# Patient Record
Sex: MALE | Race: WHITE | Employment: OTHER | URBAN - METROPOLITAN AREA
[De-identification: names, ages, dates, MRNs, and addresses within clinical notes are randomized per-mention and may not be internally consistent; named-entity substitution may affect disease eponyms.]

---

## 2017-03-03 ENCOUNTER — HOSPITAL ENCOUNTER (OUTPATIENT)
Age: 77
Setting detail: OBSERVATION
Discharge: HOME | End: 2017-03-04
Attending: EMERGENCY MEDICINE | Admitting: EMERGENCY MEDICINE
Payer: MEDICARE

## 2017-03-03 ENCOUNTER — APPOINTMENT (OUTPATIENT)
Dept: GENERAL RADIOLOGY | Age: 77
End: 2017-03-03
Payer: MEDICARE

## 2017-03-03 DIAGNOSIS — R50.9 FEVER, UNSPECIFIED FEVER CAUSE: ICD-10-CM

## 2017-03-03 DIAGNOSIS — R41.0 CONFUSION: ICD-10-CM

## 2017-03-03 DIAGNOSIS — J10.1 INFLUENZA A: Primary | ICD-10-CM

## 2017-03-03 LAB
ABSOLUTE EOS #: 0 K/UL (ref 0–0.4)
ABSOLUTE LYMPH #: 0.6 K/UL (ref 1–4.8)
ABSOLUTE MONO #: 1.2 K/UL (ref 0.1–1.2)
ANION GAP SERPL CALCULATED.3IONS-SCNC: 12 MMOL/L (ref 9–17)
BASOPHILS # BLD: 0 % (ref 0–2)
BASOPHILS ABSOLUTE: 0 K/UL (ref 0–0.2)
BNP INTERPRETATION: ABNORMAL
BUN BLDV-MCNC: 17 MG/DL (ref 8–23)
BUN/CREAT BLD: ABNORMAL (ref 9–20)
CALCIUM SERPL-MCNC: 8.2 MG/DL (ref 8.6–10.4)
CHLORIDE BLD-SCNC: 98 MMOL/L (ref 98–107)
CO2: 22 MMOL/L (ref 20–31)
CREAT SERPL-MCNC: 0.95 MG/DL (ref 0.7–1.2)
DIFFERENTIAL TYPE: ABNORMAL
DIRECT EXAM: ABNORMAL
EOSINOPHILS RELATIVE PERCENT: 0 % (ref 1–4)
GFR AFRICAN AMERICAN: >60 ML/MIN
GFR NON-AFRICAN AMERICAN: >60 ML/MIN
GFR SERPL CREATININE-BSD FRML MDRD: ABNORMAL ML/MIN/{1.73_M2}
GFR SERPL CREATININE-BSD FRML MDRD: ABNORMAL ML/MIN/{1.73_M2}
GLUCOSE BLD-MCNC: 110 MG/DL (ref 70–99)
HCT VFR BLD CALC: 33.5 % (ref 41–53)
HEMOGLOBIN: 11.3 G/DL (ref 13.5–17.5)
INR BLD: >13.9
LACTIC ACID, WHOLE BLOOD: 0.9 MMOL/L (ref 0.7–2.1)
LACTIC ACID: NORMAL MMOL/L
LYMPHOCYTES # BLD: 6 % (ref 24–44)
Lab: ABNORMAL
MCH RBC QN AUTO: 30.4 PG (ref 26–34)
MCHC RBC AUTO-ENTMCNC: 33.8 G/DL (ref 31–37)
MCV RBC AUTO: 90.1 FL (ref 80–100)
MONOCYTES # BLD: 13 % (ref 2–11)
PDW BLD-RTO: 13.2 % (ref 12.5–15.4)
PLATELET # BLD: 94 K/UL (ref 140–450)
PLATELET ESTIMATE: ABNORMAL
PMV BLD AUTO: 8.4 FL (ref 6–12)
POC TROPONIN I: 0 NG/ML (ref 0–0.1)
POC TROPONIN INTERP: NORMAL
POTASSIUM SERPL-SCNC: 3.6 MMOL/L (ref 3.7–5.3)
PRO-BNP: 755 PG/ML
PROTHROMBIN TIME: >150 SEC (ref 9.4–12.6)
RBC # BLD: 3.71 M/UL (ref 4.5–5.9)
RBC # BLD: ABNORMAL 10*6/UL
SEG NEUTROPHILS: 81 % (ref 36–66)
SEGMENTED NEUTROPHILS ABSOLUTE COUNT: 7.7 K/UL (ref 1.8–7.7)
SODIUM BLD-SCNC: 132 MMOL/L (ref 135–144)
SPECIMEN DESCRIPTION: ABNORMAL
STATUS: ABNORMAL
WBC # BLD: 9.5 K/UL (ref 3.5–11)
WBC # BLD: ABNORMAL 10*3/UL

## 2017-03-03 PROCEDURE — 85610 PROTHROMBIN TIME: CPT

## 2017-03-03 PROCEDURE — 6370000000 HC RX 637 (ALT 250 FOR IP): Performed by: EMERGENCY MEDICINE

## 2017-03-03 PROCEDURE — 71020 XR CHEST STANDARD TWO VW: CPT

## 2017-03-03 PROCEDURE — 81001 URINALYSIS AUTO W/SCOPE: CPT

## 2017-03-03 PROCEDURE — 99285 EMERGENCY DEPT VISIT HI MDM: CPT

## 2017-03-03 PROCEDURE — 93005 ELECTROCARDIOGRAM TRACING: CPT

## 2017-03-03 PROCEDURE — 80076 HEPATIC FUNCTION PANEL: CPT

## 2017-03-03 PROCEDURE — 83605 ASSAY OF LACTIC ACID: CPT

## 2017-03-03 PROCEDURE — 87804 INFLUENZA ASSAY W/OPTIC: CPT

## 2017-03-03 PROCEDURE — 80048 BASIC METABOLIC PNL TOTAL CA: CPT

## 2017-03-03 PROCEDURE — 6360000002 HC RX W HCPCS: Performed by: EMERGENCY MEDICINE

## 2017-03-03 PROCEDURE — 36415 COLL VENOUS BLD VENIPUNCTURE: CPT

## 2017-03-03 PROCEDURE — 84484 ASSAY OF TROPONIN QUANT: CPT

## 2017-03-03 PROCEDURE — 2580000003 HC RX 258: Performed by: EMERGENCY MEDICINE

## 2017-03-03 PROCEDURE — 83880 ASSAY OF NATRIURETIC PEPTIDE: CPT

## 2017-03-03 PROCEDURE — 85025 COMPLETE CBC W/AUTO DIFF WBC: CPT

## 2017-03-03 RX ORDER — ONDANSETRON 2 MG/ML
4 INJECTION INTRAMUSCULAR; INTRAVENOUS ONCE
Status: COMPLETED | OUTPATIENT
Start: 2017-03-03 | End: 2017-03-03

## 2017-03-03 RX ORDER — 0.9 % SODIUM CHLORIDE 0.9 %
1000 INTRAVENOUS SOLUTION INTRAVENOUS ONCE
Status: COMPLETED | OUTPATIENT
Start: 2017-03-03 | End: 2017-03-04

## 2017-03-03 RX ORDER — ACETAMINOPHEN 325 MG/1
650 TABLET ORAL ONCE
Status: COMPLETED | OUTPATIENT
Start: 2017-03-03 | End: 2017-03-03

## 2017-03-03 RX ADMIN — ONDANSETRON 4 MG: 2 INJECTION, SOLUTION INTRAMUSCULAR; INTRAVENOUS at 23:03

## 2017-03-03 RX ADMIN — ACETAMINOPHEN 650 MG: 325 TABLET ORAL at 23:03

## 2017-03-03 RX ADMIN — SODIUM CHLORIDE 1000 ML: 9 INJECTION, SOLUTION INTRAVENOUS at 23:06

## 2017-03-03 ASSESSMENT — PAIN SCALES - GENERAL: PAINLEVEL_OUTOF10: 0

## 2017-03-03 ASSESSMENT — ENCOUNTER SYMPTOMS
PHOTOPHOBIA: 0
NAUSEA: 0
SHORTNESS OF BREATH: 1
ABDOMINAL PAIN: 0
COUGH: 1
DIARRHEA: 0
CHEST TIGHTNESS: 1
CONSTIPATION: 0
VOMITING: 0

## 2017-03-04 VITALS
RESPIRATION RATE: 18 BRPM | HEART RATE: 52 BPM | BODY MASS INDEX: 24.34 KG/M2 | TEMPERATURE: 98.1 F | SYSTOLIC BLOOD PRESSURE: 120 MMHG | HEIGHT: 70 IN | WEIGHT: 170 LBS | OXYGEN SATURATION: 98 % | DIASTOLIC BLOOD PRESSURE: 64 MMHG

## 2017-03-04 PROBLEM — Z95.1 S/P CABG (CORONARY ARTERY BYPASS GRAFT): Chronic | Status: ACTIVE | Noted: 2017-03-04

## 2017-03-04 PROBLEM — I44.7 LBBB (LEFT BUNDLE BRANCH BLOCK): Chronic | Status: ACTIVE | Noted: 2017-03-04

## 2017-03-04 LAB
-: ABNORMAL
ALBUMIN SERPL-MCNC: 3.9 G/DL (ref 3.5–5.2)
ALBUMIN/GLOBULIN RATIO: 1.6 (ref 1–2.5)
ALP BLD-CCNC: 62 U/L (ref 40–129)
ALT SERPL-CCNC: 15 U/L (ref 5–41)
AMORPHOUS: ABNORMAL
AST SERPL-CCNC: 21 U/L
BACTERIA: ABNORMAL
BILIRUB SERPL-MCNC: 0.89 MG/DL (ref 0.3–1.2)
BILIRUBIN DIRECT: 0.27 MG/DL
BILIRUBIN URINE: NEGATIVE
BILIRUBIN, INDIRECT: 0.62 MG/DL (ref 0–1)
CASTS UA: ABNORMAL /LPF (ref 0–8)
COLOR: YELLOW
CRYSTALS, UA: ABNORMAL /HPF
EKG ATRIAL RATE: 56 BPM
EKG ATRIAL RATE: 56 BPM
EKG ATRIAL RATE: 70 BPM
EKG P AXIS: 60 DEGREES
EKG P AXIS: 65 DEGREES
EKG P AXIS: 83 DEGREES
EKG P-R INTERVAL: 152 MS
EKG P-R INTERVAL: 154 MS
EKG P-R INTERVAL: 166 MS
EKG Q-T INTERVAL: 464 MS
EKG Q-T INTERVAL: 492 MS
EKG Q-T INTERVAL: 512 MS
EKG QRS DURATION: 122 MS
EKG QRS DURATION: 132 MS
EKG QRS DURATION: 132 MS
EKG QTC CALCULATION (BAZETT): 474 MS
EKG QTC CALCULATION (BAZETT): 494 MS
EKG QTC CALCULATION (BAZETT): 501 MS
EKG R AXIS: 66 DEGREES
EKG R AXIS: 70 DEGREES
EKG R AXIS: 87 DEGREES
EKG T AXIS: 4 DEGREES
EKG T AXIS: 5 DEGREES
EKG T AXIS: 55 DEGREES
EKG VENTRICULAR RATE: 56 BPM
EKG VENTRICULAR RATE: 56 BPM
EKG VENTRICULAR RATE: 70 BPM
EPITHELIAL CELLS UA: ABNORMAL /HPF (ref 0–5)
GLOBULIN: ABNORMAL G/DL (ref 1.5–3.8)
GLUCOSE URINE: NEGATIVE
INR BLD: 1.2
KETONES, URINE: ABNORMAL
LEUKOCYTE ESTERASE, URINE: NEGATIVE
MUCUS: ABNORMAL
NITRITE, URINE: NEGATIVE
OTHER OBSERVATIONS UA: ABNORMAL
PH UA: 5.5 (ref 5–8)
PROTEIN UA: NEGATIVE
PROTHROMBIN TIME: 12.8 SEC (ref 9.4–12.6)
RBC UA: ABNORMAL /HPF (ref 0–4)
RENAL EPITHELIAL, UA: ABNORMAL /HPF
SPECIFIC GRAVITY UA: 1.02 (ref 1–1.03)
TOTAL PROTEIN: 6.3 G/DL (ref 6.4–8.3)
TRICHOMONAS: ABNORMAL
TROPONIN INTERP: NORMAL
TROPONIN INTERP: NORMAL
TROPONIN T: <0.03 NG/ML
TROPONIN T: <0.03 NG/ML
TURBIDITY: CLEAR
URINE HGB: NEGATIVE
UROBILINOGEN, URINE: NORMAL
WBC UA: ABNORMAL /HPF (ref 0–5)
YEAST: ABNORMAL

## 2017-03-04 PROCEDURE — G0378 HOSPITAL OBSERVATION PER HR: HCPCS

## 2017-03-04 PROCEDURE — 2580000003 HC RX 258: Performed by: EMERGENCY MEDICINE

## 2017-03-04 PROCEDURE — 36415 COLL VENOUS BLD VENIPUNCTURE: CPT

## 2017-03-04 PROCEDURE — 96374 THER/PROPH/DIAG INJ IV PUSH: CPT

## 2017-03-04 PROCEDURE — 6370000000 HC RX 637 (ALT 250 FOR IP): Performed by: EMERGENCY MEDICINE

## 2017-03-04 PROCEDURE — 93005 ELECTROCARDIOGRAM TRACING: CPT

## 2017-03-04 PROCEDURE — 84484 ASSAY OF TROPONIN QUANT: CPT

## 2017-03-04 RX ORDER — PANTOPRAZOLE SODIUM 40 MG/1
40 TABLET, DELAYED RELEASE ORAL NIGHTLY
Status: DISCONTINUED | OUTPATIENT
Start: 2017-03-04 | End: 2017-03-04 | Stop reason: HOSPADM

## 2017-03-04 RX ORDER — PANTOPRAZOLE SODIUM 40 MG/1
40 TABLET, DELAYED RELEASE ORAL DAILY
Status: DISCONTINUED | OUTPATIENT
Start: 2017-03-04 | End: 2017-03-04

## 2017-03-04 RX ORDER — OSELTAMIVIR PHOSPHATE 75 MG/1
75 CAPSULE ORAL 2 TIMES DAILY
Status: DISCONTINUED | OUTPATIENT
Start: 2017-03-04 | End: 2017-03-04 | Stop reason: HOSPADM

## 2017-03-04 RX ORDER — METOPROLOL SUCCINATE 50 MG/1
50 TABLET, EXTENDED RELEASE ORAL DAILY
Status: DISCONTINUED | OUTPATIENT
Start: 2017-03-04 | End: 2017-03-04

## 2017-03-04 RX ORDER — OSELTAMIVIR PHOSPHATE 75 MG/1
75 CAPSULE ORAL ONCE
Status: COMPLETED | OUTPATIENT
Start: 2017-03-04 | End: 2017-03-04

## 2017-03-04 RX ORDER — ACETAMINOPHEN 325 MG/1
650 TABLET ORAL EVERY 4 HOURS PRN
Status: DISCONTINUED | OUTPATIENT
Start: 2017-03-04 | End: 2017-03-04 | Stop reason: HOSPADM

## 2017-03-04 RX ORDER — MAG HYDROX/ALUMINUM HYD/SIMETH 400-400-40
1 SUSPENSION, ORAL (FINAL DOSE FORM) ORAL DAILY
Status: DISCONTINUED | OUTPATIENT
Start: 2017-03-04 | End: 2017-03-04

## 2017-03-04 RX ORDER — GABAPENTIN 100 MG/1
100 CAPSULE ORAL NIGHTLY
Status: DISCONTINUED | OUTPATIENT
Start: 2017-03-04 | End: 2017-03-04

## 2017-03-04 RX ORDER — GABAPENTIN 300 MG/1
300 CAPSULE ORAL 3 TIMES DAILY
Status: DISCONTINUED | OUTPATIENT
Start: 2017-03-04 | End: 2017-03-04 | Stop reason: HOSPADM

## 2017-03-04 RX ORDER — CLOPIDOGREL BISULFATE 75 MG/1
75 TABLET ORAL DAILY
Status: DISCONTINUED | OUTPATIENT
Start: 2017-03-04 | End: 2017-03-04

## 2017-03-04 RX ORDER — CLOPIDOGREL BISULFATE 75 MG/1
75 TABLET ORAL DAILY
Status: DISCONTINUED | OUTPATIENT
Start: 2017-03-04 | End: 2017-03-04 | Stop reason: HOSPADM

## 2017-03-04 RX ORDER — METOPROLOL SUCCINATE 25 MG/1
25 TABLET, EXTENDED RELEASE ORAL NIGHTLY
Status: DISCONTINUED | OUTPATIENT
Start: 2017-03-04 | End: 2017-03-04 | Stop reason: HOSPADM

## 2017-03-04 RX ORDER — ASPIRIN 81 MG/1
81 TABLET ORAL DAILY
Status: DISCONTINUED | OUTPATIENT
Start: 2017-03-04 | End: 2017-03-04 | Stop reason: HOSPADM

## 2017-03-04 RX ORDER — SODIUM CHLORIDE 0.9 % (FLUSH) 0.9 %
10 SYRINGE (ML) INJECTION PRN
Status: DISCONTINUED | OUTPATIENT
Start: 2017-03-04 | End: 2017-03-04 | Stop reason: HOSPADM

## 2017-03-04 RX ORDER — METOPROLOL SUCCINATE 50 MG/1
50 TABLET, EXTENDED RELEASE ORAL NIGHTLY
Status: DISCONTINUED | OUTPATIENT
Start: 2017-03-04 | End: 2017-03-04

## 2017-03-04 RX ORDER — SODIUM CHLORIDE 0.9 % (FLUSH) 0.9 %
10 SYRINGE (ML) INJECTION EVERY 12 HOURS SCHEDULED
Status: DISCONTINUED | OUTPATIENT
Start: 2017-03-04 | End: 2017-03-04 | Stop reason: HOSPADM

## 2017-03-04 RX ORDER — SIMVASTATIN 20 MG
20 TABLET ORAL NIGHTLY
Status: DISCONTINUED | OUTPATIENT
Start: 2017-03-04 | End: 2017-03-04 | Stop reason: HOSPADM

## 2017-03-04 RX ORDER — SODIUM CHLORIDE 9 MG/ML
INJECTION, SOLUTION INTRAVENOUS CONTINUOUS
Status: DISCONTINUED | OUTPATIENT
Start: 2017-03-04 | End: 2017-03-04 | Stop reason: HOSPADM

## 2017-03-04 RX ORDER — METOPROLOL SUCCINATE 50 MG/1
25 TABLET, EXTENDED RELEASE ORAL DAILY
Qty: 30 TABLET | Refills: 3 | Status: ON HOLD | OUTPATIENT
Start: 2017-03-04 | End: 2018-03-25

## 2017-03-04 RX ADMIN — GABAPENTIN 300 MG: 300 CAPSULE ORAL at 15:44

## 2017-03-04 RX ADMIN — OSELTAMIVIR PHOSPHATE 75 MG: 75 CAPSULE ORAL at 00:54

## 2017-03-04 RX ADMIN — PANTOPRAZOLE SODIUM 40 MG: 40 TABLET, DELAYED RELEASE ORAL at 02:31

## 2017-03-04 RX ADMIN — SIMVASTATIN 20 MG: 20 TABLET, FILM COATED ORAL at 02:31

## 2017-03-04 RX ADMIN — GABAPENTIN 300 MG: 300 CAPSULE ORAL at 08:02

## 2017-03-04 RX ADMIN — GABAPENTIN 300 MG: 300 CAPSULE ORAL at 02:31

## 2017-03-04 RX ADMIN — OSELTAMIVIR PHOSPHATE 75 MG: 75 CAPSULE ORAL at 08:02

## 2017-03-04 RX ADMIN — SODIUM CHLORIDE: 9 INJECTION, SOLUTION INTRAVENOUS at 01:42

## 2017-03-04 RX ADMIN — ASPIRIN 81 MG: 81 TABLET, COATED ORAL at 08:02

## 2017-03-04 RX ADMIN — CLOPIDOGREL 75 MG: 75 TABLET, FILM COATED ORAL at 02:31

## 2017-03-04 ASSESSMENT — PAIN SCALES - GENERAL: PAINLEVEL_OUTOF10: 0

## 2017-04-06 ENCOUNTER — HOSPITAL ENCOUNTER (OUTPATIENT)
Age: 77
Setting detail: SPECIMEN
Discharge: HOME | End: 2017-04-06
Payer: MEDICARE

## 2017-04-07 LAB — DERMATOLOGY PATHOLOGY REPORT: NORMAL

## 2018-03-23 ENCOUNTER — HOSPITAL ENCOUNTER (OUTPATIENT)
Age: 78
Discharge: HOME | End: 2018-03-25
Attending: INTERNAL MEDICINE | Admitting: INTERNAL MEDICINE
Payer: MEDICARE

## 2018-03-23 ENCOUNTER — HOSPITAL ENCOUNTER (INPATIENT)
Dept: CARDIAC CATH/INVASIVE PROCEDURES | Age: 78
Discharge: HOME | End: 2018-03-23
Attending: INTERNAL MEDICINE
Payer: MEDICARE

## 2018-03-23 VITALS
OXYGEN SATURATION: 100 % | TEMPERATURE: 97.9 F | WEIGHT: 155 LBS | SYSTOLIC BLOOD PRESSURE: 129 MMHG | DIASTOLIC BLOOD PRESSURE: 68 MMHG | HEART RATE: 64 BPM | RESPIRATION RATE: 16 BRPM | HEIGHT: 70 IN | BODY MASS INDEX: 22.19 KG/M2

## 2018-03-23 DIAGNOSIS — I20.0 UNSTABLE ANGINA (HCC): ICD-10-CM

## 2018-03-23 LAB
GFR NON-AFRICAN AMERICAN: >60 ML/MIN
GFR SERPL CREATININE-BSD FRML MDRD: >60 ML/MIN
GFR SERPL CREATININE-BSD FRML MDRD: NORMAL ML/MIN/{1.73_M2}
GLUCOSE BLD-MCNC: 97 MG/DL (ref 74–100)
POC CREATININE: 0.74 MG/DL (ref 0.51–1.19)
POC HEMATOCRIT: 24 % (ref 41–53)
POC HEMOGLOBIN: 8.2 G/DL (ref 13.5–17.5)
POC IONIZED CALCIUM: 1.11 MMOL/L (ref 1.15–1.33)
POC POTASSIUM: 4.6 MMOL/L (ref 3.5–4.5)
POC SODIUM: 139 MMOL/L (ref 138–146)
TROPONIN INTERP: ABNORMAL
TROPONIN T: 1.11 NG/ML

## 2018-03-23 PROCEDURE — 2709999900 HC NON-CHARGEABLE SUPPLY

## 2018-03-23 PROCEDURE — C1874 STENT, COATED/COV W/DEL SYS: HCPCS

## 2018-03-23 PROCEDURE — 84295 ASSAY OF SERUM SODIUM: CPT

## 2018-03-23 PROCEDURE — 84132 ASSAY OF SERUM POTASSIUM: CPT

## 2018-03-23 PROCEDURE — 6360000002 HC RX W HCPCS

## 2018-03-23 PROCEDURE — 6370000000 HC RX 637 (ALT 250 FOR IP)

## 2018-03-23 PROCEDURE — C1887 CATHETER, GUIDING: HCPCS

## 2018-03-23 PROCEDURE — 82330 ASSAY OF CALCIUM: CPT

## 2018-03-23 PROCEDURE — C9600 PERC DRUG-EL COR STENT SING: HCPCS | Performed by: INTERNAL MEDICINE

## 2018-03-23 PROCEDURE — C1760 CLOSURE DEV, VASC: HCPCS

## 2018-03-23 PROCEDURE — C1725 CATH, TRANSLUMIN NON-LASER: HCPCS

## 2018-03-23 PROCEDURE — 84484 ASSAY OF TROPONIN QUANT: CPT

## 2018-03-23 PROCEDURE — C1769 GUIDE WIRE: HCPCS

## 2018-03-23 PROCEDURE — C1894 INTRO/SHEATH, NON-LASER: HCPCS

## 2018-03-23 PROCEDURE — 82947 ASSAY GLUCOSE BLOOD QUANT: CPT

## 2018-03-23 PROCEDURE — 93459 L HRT ART/GRFT ANGIO: CPT | Performed by: INTERNAL MEDICINE

## 2018-03-23 PROCEDURE — 6360000004 HC RX CONTRAST MEDICATION

## 2018-03-23 PROCEDURE — 2500000003 HC RX 250 WO HCPCS

## 2018-03-23 PROCEDURE — 6370000000 HC RX 637 (ALT 250 FOR IP): Performed by: INTERNAL MEDICINE

## 2018-03-23 PROCEDURE — 36415 COLL VENOUS BLD VENIPUNCTURE: CPT

## 2018-03-23 PROCEDURE — 92921 HC PRQ CARDIAC ANGIO ADDL ART: CPT | Performed by: INTERNAL MEDICINE

## 2018-03-23 PROCEDURE — 85014 HEMATOCRIT: CPT

## 2018-03-23 PROCEDURE — 2060000000 HC ICU INTERMEDIATE R&B

## 2018-03-23 PROCEDURE — 82565 ASSAY OF CREATININE: CPT

## 2018-03-23 RX ORDER — ONDANSETRON 2 MG/ML
4 INJECTION INTRAMUSCULAR; INTRAVENOUS EVERY 6 HOURS PRN
Status: DISCONTINUED | OUTPATIENT
Start: 2018-03-23 | End: 2018-03-25 | Stop reason: HOSPADM

## 2018-03-23 RX ORDER — PANTOPRAZOLE SODIUM 40 MG/1
40 TABLET, DELAYED RELEASE ORAL DAILY
Status: DISCONTINUED | OUTPATIENT
Start: 2018-03-24 | End: 2018-03-23 | Stop reason: SDUPTHER

## 2018-03-23 RX ORDER — DUTASTERIDE 0.5 MG/1
0.5 CAPSULE, LIQUID FILLED ORAL DAILY
COMMUNITY
End: 2021-10-11

## 2018-03-23 RX ORDER — CLOPIDOGREL BISULFATE 75 MG/1
75 TABLET ORAL DAILY
Status: DISCONTINUED | OUTPATIENT
Start: 2018-03-24 | End: 2018-03-24 | Stop reason: HOSPADM

## 2018-03-23 RX ORDER — ACETAMINOPHEN 325 MG/1
650 TABLET ORAL EVERY 4 HOURS PRN
Status: DISCONTINUED | OUTPATIENT
Start: 2018-03-23 | End: 2018-03-23 | Stop reason: SDUPTHER

## 2018-03-23 RX ORDER — SODIUM CHLORIDE 0.9 % (FLUSH) 0.9 %
10 SYRINGE (ML) INJECTION EVERY 12 HOURS SCHEDULED
Status: DISCONTINUED | OUTPATIENT
Start: 2018-03-23 | End: 2018-03-24 | Stop reason: HOSPADM

## 2018-03-23 RX ORDER — ONDANSETRON 2 MG/ML
4 INJECTION INTRAMUSCULAR; INTRAVENOUS EVERY 6 HOURS PRN
Status: DISCONTINUED | OUTPATIENT
Start: 2018-03-23 | End: 2018-03-23 | Stop reason: SDUPTHER

## 2018-03-23 RX ORDER — SODIUM CHLORIDE 0.9 % (FLUSH) 0.9 %
10 SYRINGE (ML) INJECTION EVERY 12 HOURS SCHEDULED
Status: DISCONTINUED | OUTPATIENT
Start: 2018-03-23 | End: 2018-03-25 | Stop reason: HOSPADM

## 2018-03-23 RX ORDER — FINASTERIDE 5 MG/1
5 TABLET, FILM COATED ORAL DAILY
Status: DISCONTINUED | OUTPATIENT
Start: 2018-03-24 | End: 2018-03-23 | Stop reason: SDUPTHER

## 2018-03-23 RX ORDER — DIPHENHYDRAMINE HCL 25 MG
25 TABLET ORAL NIGHTLY
Status: DISCONTINUED | OUTPATIENT
Start: 2018-03-23 | End: 2018-03-23 | Stop reason: SDUPTHER

## 2018-03-23 RX ORDER — METOPROLOL SUCCINATE 25 MG/1
25 TABLET, EXTENDED RELEASE ORAL DAILY
Status: DISCONTINUED | OUTPATIENT
Start: 2018-03-24 | End: 2018-03-23 | Stop reason: SDUPTHER

## 2018-03-23 RX ORDER — GABAPENTIN 100 MG/1
100 CAPSULE ORAL NIGHTLY
Status: DISCONTINUED | OUTPATIENT
Start: 2018-03-23 | End: 2018-03-23 | Stop reason: SDUPTHER

## 2018-03-23 RX ORDER — NITROGLYCERIN 0.4 MG/1
0.4 TABLET SUBLINGUAL EVERY 5 MIN PRN
Status: DISCONTINUED | OUTPATIENT
Start: 2018-03-23 | End: 2018-03-25 | Stop reason: HOSPADM

## 2018-03-23 RX ORDER — ACETAMINOPHEN 325 MG/1
650 TABLET ORAL EVERY 4 HOURS PRN
Status: DISCONTINUED | OUTPATIENT
Start: 2018-03-23 | End: 2018-03-25 | Stop reason: HOSPADM

## 2018-03-23 RX ORDER — SODIUM CHLORIDE 9 MG/ML
INJECTION, SOLUTION INTRAVENOUS CONTINUOUS
Status: DISCONTINUED | OUTPATIENT
Start: 2018-03-23 | End: 2018-03-24 | Stop reason: HOSPADM

## 2018-03-23 RX ORDER — SODIUM CHLORIDE 0.9 % (FLUSH) 0.9 %
10 SYRINGE (ML) INJECTION PRN
Status: DISCONTINUED | OUTPATIENT
Start: 2018-03-23 | End: 2018-03-25 | Stop reason: HOSPADM

## 2018-03-23 RX ORDER — CLOPIDOGREL BISULFATE 75 MG/1
75 TABLET ORAL DAILY
Status: DISCONTINUED | OUTPATIENT
Start: 2018-03-24 | End: 2018-03-23 | Stop reason: SDUPTHER

## 2018-03-23 RX ORDER — SIMVASTATIN 20 MG
20 TABLET ORAL NIGHTLY
Status: DISCONTINUED | OUTPATIENT
Start: 2018-03-23 | End: 2018-03-23 | Stop reason: SDUPTHER

## 2018-03-23 RX ORDER — NITROGLYCERIN 0.4 MG/1
0.4 TABLET SUBLINGUAL EVERY 5 MIN PRN
Status: DISCONTINUED | OUTPATIENT
Start: 2018-03-23 | End: 2018-03-23 | Stop reason: SDUPTHER

## 2018-03-23 RX ORDER — NITROGLYCERIN 0.4 MG/1
0.4 TABLET SUBLINGUAL EVERY 5 MIN PRN
COMMUNITY

## 2018-03-23 RX ORDER — DIPHENHYDRAMINE HCL 25 MG
25 TABLET ORAL NIGHTLY
Status: DISCONTINUED | OUTPATIENT
Start: 2018-03-23 | End: 2018-03-24 | Stop reason: HOSPADM

## 2018-03-23 RX ORDER — SODIUM CHLORIDE 0.9 % (FLUSH) 0.9 %
10 SYRINGE (ML) INJECTION PRN
Status: DISCONTINUED | OUTPATIENT
Start: 2018-03-23 | End: 2018-03-24 | Stop reason: HOSPADM

## 2018-03-23 RX ORDER — DIPHENHYDRAMINE HCL 25 MG
TABLET ORAL
Status: COMPLETED
Start: 2018-03-23 | End: 2018-03-24

## 2018-03-23 RX ORDER — ASPIRIN 81 MG/1
81 TABLET, CHEWABLE ORAL DAILY
Status: DISCONTINUED | OUTPATIENT
Start: 2018-03-24 | End: 2018-03-23 | Stop reason: SDUPTHER

## 2018-03-23 RX ORDER — METOPROLOL SUCCINATE 25 MG/1
25 TABLET, EXTENDED RELEASE ORAL DAILY
Status: DISCONTINUED | OUTPATIENT
Start: 2018-03-24 | End: 2018-03-25 | Stop reason: HOSPADM

## 2018-03-23 RX ORDER — PANTOPRAZOLE SODIUM 40 MG/1
40 TABLET, DELAYED RELEASE ORAL DAILY
Status: DISCONTINUED | OUTPATIENT
Start: 2018-03-24 | End: 2018-03-24 | Stop reason: HOSPADM

## 2018-03-23 RX ORDER — FINASTERIDE 5 MG/1
5 TABLET, FILM COATED ORAL DAILY
Status: DISCONTINUED | OUTPATIENT
Start: 2018-03-24 | End: 2018-03-25 | Stop reason: HOSPADM

## 2018-03-23 RX ORDER — SIMVASTATIN 20 MG
20 TABLET ORAL NIGHTLY
Status: DISCONTINUED | OUTPATIENT
Start: 2018-03-23 | End: 2018-03-25 | Stop reason: HOSPADM

## 2018-03-23 RX ORDER — GABAPENTIN 100 MG/1
100 CAPSULE ORAL NIGHTLY
Status: DISCONTINUED | OUTPATIENT
Start: 2018-03-23 | End: 2018-03-24 | Stop reason: HOSPADM

## 2018-03-23 RX ADMIN — SIMVASTATIN 20 MG: 20 TABLET, FILM COATED ORAL at 22:29

## 2018-03-23 RX ADMIN — SODIUM CHLORIDE: 9 INJECTION, SOLUTION INTRAVENOUS at 19:31

## 2018-03-23 RX ADMIN — ACETAMINOPHEN 650 MG: 325 TABLET ORAL at 22:29

## 2018-03-23 ASSESSMENT — PAIN DESCRIPTION - PROGRESSION: CLINICAL_PROGRESSION: GRADUALLY WORSENING

## 2018-03-23 ASSESSMENT — PAIN DESCRIPTION - ONSET: ONSET: GRADUAL

## 2018-03-23 ASSESSMENT — PAIN DESCRIPTION - LOCATION: LOCATION: BACK

## 2018-03-23 ASSESSMENT — PAIN DESCRIPTION - PAIN TYPE: TYPE: CHRONIC PAIN

## 2018-03-23 ASSESSMENT — PAIN SCALES - GENERAL: PAINLEVEL_OUTOF10: 5

## 2018-03-23 ASSESSMENT — PAIN DESCRIPTION - ORIENTATION: ORIENTATION: LOWER

## 2018-03-23 ASSESSMENT — PAIN DESCRIPTION - DESCRIPTORS: DESCRIPTORS: ACHING;DISCOMFORT

## 2018-03-23 ASSESSMENT — PAIN DESCRIPTION - FREQUENCY: FREQUENCY: INTERMITTENT

## 2018-03-24 PROBLEM — Z98.890 S/P CARDIAC CATH: Status: ACTIVE | Noted: 2018-03-24

## 2018-03-24 LAB
ALBUMIN SERPL-MCNC: 4 G/DL (ref 3.5–5.2)
ALBUMIN/GLOBULIN RATIO: 1.7 (ref 1–2.5)
ALP BLD-CCNC: 73 U/L (ref 40–129)
ALT SERPL-CCNC: 30 U/L (ref 5–41)
ANION GAP SERPL CALCULATED.3IONS-SCNC: 15 MMOL/L (ref 9–17)
AST SERPL-CCNC: 156 U/L
BILIRUB SERPL-MCNC: 0.53 MG/DL (ref 0.3–1.2)
BUN BLDV-MCNC: 18 MG/DL (ref 8–23)
BUN/CREAT BLD: ABNORMAL (ref 9–20)
CALCIUM SERPL-MCNC: 9.1 MG/DL (ref 8.6–10.4)
CHLORIDE BLD-SCNC: 108 MMOL/L (ref 98–107)
CHOLESTEROL/HDL RATIO: 2.5
CHOLESTEROL: 124 MG/DL
CO2: 19 MMOL/L (ref 20–31)
CREAT SERPL-MCNC: 0.72 MG/DL (ref 0.7–1.2)
GFR AFRICAN AMERICAN: >60 ML/MIN
GFR NON-AFRICAN AMERICAN: >60 ML/MIN
GFR SERPL CREATININE-BSD FRML MDRD: ABNORMAL ML/MIN/{1.73_M2}
GFR SERPL CREATININE-BSD FRML MDRD: ABNORMAL ML/MIN/{1.73_M2}
GLUCOSE BLD-MCNC: 170 MG/DL (ref 70–99)
HCT VFR BLD CALC: 34.8 % (ref 40.7–50.3)
HDLC SERPL-MCNC: 50 MG/DL
HEMOGLOBIN: 11.2 G/DL (ref 13–17)
LDL CHOLESTEROL: 52 MG/DL (ref 0–130)
MCH RBC QN AUTO: 30.9 PG (ref 25.2–33.5)
MCHC RBC AUTO-ENTMCNC: 32.2 G/DL (ref 28.4–34.8)
MCV RBC AUTO: 95.9 FL (ref 82.6–102.9)
NRBC AUTOMATED: 0 PER 100 WBC
PDW BLD-RTO: 11.9 % (ref 11.8–14.4)
PLATELET # BLD: 153 K/UL (ref 138–453)
PMV BLD AUTO: 10.5 FL (ref 8.1–13.5)
POTASSIUM SERPL-SCNC: 3.9 MMOL/L (ref 3.7–5.3)
RBC # BLD: 3.63 M/UL (ref 4.21–5.77)
SODIUM BLD-SCNC: 142 MMOL/L (ref 135–144)
TOTAL PROTEIN: 6.4 G/DL (ref 6.4–8.3)
TRIGL SERPL-MCNC: 109 MG/DL
TROPONIN INTERP: ABNORMAL
TROPONIN T: 2.34 NG/ML
VLDLC SERPL CALC-MCNC: NORMAL MG/DL (ref 1–30)
WBC # BLD: 9.2 K/UL (ref 3.5–11.3)

## 2018-03-24 PROCEDURE — 6360000002 HC RX W HCPCS: Performed by: INTERNAL MEDICINE

## 2018-03-24 PROCEDURE — 85027 COMPLETE CBC AUTOMATED: CPT

## 2018-03-24 PROCEDURE — 93005 ELECTROCARDIOGRAM TRACING: CPT

## 2018-03-24 PROCEDURE — 80061 LIPID PANEL: CPT

## 2018-03-24 PROCEDURE — 84484 ASSAY OF TROPONIN QUANT: CPT

## 2018-03-24 PROCEDURE — 36415 COLL VENOUS BLD VENIPUNCTURE: CPT

## 2018-03-24 PROCEDURE — 94762 N-INVAS EAR/PLS OXIMTRY CONT: CPT

## 2018-03-24 PROCEDURE — 80053 COMPREHEN METABOLIC PANEL: CPT

## 2018-03-24 PROCEDURE — 6370000000 HC RX 637 (ALT 250 FOR IP)

## 2018-03-24 PROCEDURE — 6370000000 HC RX 637 (ALT 250 FOR IP): Performed by: INTERNAL MEDICINE

## 2018-03-24 RX ORDER — CLOPIDOGREL BISULFATE 75 MG/1
75 TABLET ORAL DAILY
Status: DISCONTINUED | OUTPATIENT
Start: 2018-03-24 | End: 2018-03-25 | Stop reason: HOSPADM

## 2018-03-24 RX ORDER — ASPIRIN 81 MG/1
81 TABLET, CHEWABLE ORAL DAILY
Status: DISCONTINUED | OUTPATIENT
Start: 2018-03-24 | End: 2018-03-25 | Stop reason: HOSPADM

## 2018-03-24 RX ORDER — FENTANYL CITRATE 50 UG/ML
INJECTION, SOLUTION INTRAMUSCULAR; INTRAVENOUS
Status: DISPENSED
Start: 2018-03-24 | End: 2018-03-24

## 2018-03-24 RX ORDER — ZOLPIDEM TARTRATE 5 MG/1
5 TABLET ORAL ONCE
Status: DISCONTINUED | OUTPATIENT
Start: 2018-03-24 | End: 2018-03-25 | Stop reason: HOSPADM

## 2018-03-24 RX ORDER — FENTANYL CITRATE 50 UG/ML
25 INJECTION, SOLUTION INTRAMUSCULAR; INTRAVENOUS
Status: DISCONTINUED | OUTPATIENT
Start: 2018-03-24 | End: 2018-03-25 | Stop reason: HOSPADM

## 2018-03-24 RX ADMIN — Medication 25 MG: at 02:09

## 2018-03-24 RX ADMIN — FINASTERIDE 5 MG: 5 TABLET, FILM COATED ORAL at 08:49

## 2018-03-24 RX ADMIN — ACETAMINOPHEN 650 MG: 325 TABLET ORAL at 05:39

## 2018-03-24 RX ADMIN — FENTANYL CITRATE 25 MCG: 50 INJECTION INTRAMUSCULAR; INTRAVENOUS at 08:28

## 2018-03-24 RX ADMIN — DIPHENHYDRAMINE HCL 25 MG: 25 TABLET ORAL at 02:09

## 2018-03-24 RX ADMIN — METOPROLOL SUCCINATE 25 MG: 25 TABLET, EXTENDED RELEASE ORAL at 08:49

## 2018-03-24 ASSESSMENT — PAIN SCALES - GENERAL
PAINLEVEL_OUTOF10: 5
PAINLEVEL_OUTOF10: 0
PAINLEVEL_OUTOF10: 7
PAINLEVEL_OUTOF10: 0
PAINLEVEL_OUTOF10: 10
PAINLEVEL_OUTOF10: 0

## 2018-03-24 ASSESSMENT — PAIN DESCRIPTION - PROGRESSION
CLINICAL_PROGRESSION: GRADUALLY IMPROVING
CLINICAL_PROGRESSION: GRADUALLY WORSENING
CLINICAL_PROGRESSION: GRADUALLY IMPROVING

## 2018-03-24 ASSESSMENT — PAIN DESCRIPTION - ONSET
ONSET: GRADUAL
ONSET: GRADUAL

## 2018-03-24 ASSESSMENT — PAIN DESCRIPTION - ORIENTATION
ORIENTATION: RIGHT
ORIENTATION: RIGHT

## 2018-03-24 ASSESSMENT — PAIN DESCRIPTION - FREQUENCY
FREQUENCY: CONTINUOUS
FREQUENCY: CONTINUOUS

## 2018-03-24 ASSESSMENT — PAIN DESCRIPTION - DESCRIPTORS
DESCRIPTORS: DISCOMFORT
DESCRIPTORS: ACHING;DISCOMFORT

## 2018-03-24 ASSESSMENT — PAIN DESCRIPTION - LOCATION
LOCATION: GROIN
LOCATION: GROIN

## 2018-03-24 ASSESSMENT — PAIN DESCRIPTION - PAIN TYPE
TYPE: CHRONIC PAIN
TYPE: CHRONIC PAIN

## 2018-03-24 NOTE — RESEARCH
-Asked by Dr Deedee Buerger to evaluate pt for protocol [employer] XIENCE 90 Trial  -Patient met inclusion/exclusion criteria,. Pt approached @ __09:_00_. -Patient read study consent. Questions answered. Pt wishes to participate in study. Consent signed voluntarily by pt @ ___09_:___54_ . on 3/24/18  -A copy of the signed consent was given to the patient  -Safety labs/EKG obtained  Pt's around the clock care giver at bedside.       Clinical Research Nurse  For questions page the research nurse at [phone]

## 2018-03-24 NOTE — CARE COORDINATION
plan is home with family support.         Electronically signed by Donta Delaney RN on 3/24/18 at 9:40 AM

## 2018-03-25 VITALS
WEIGHT: 161.38 LBS | RESPIRATION RATE: 18 BRPM | HEIGHT: 70 IN | TEMPERATURE: 98.7 F | HEART RATE: 77 BPM | OXYGEN SATURATION: 99 % | SYSTOLIC BLOOD PRESSURE: 99 MMHG | BODY MASS INDEX: 23.1 KG/M2 | DIASTOLIC BLOOD PRESSURE: 51 MMHG

## 2018-03-25 LAB
HCT VFR BLD CALC: 36.2 % (ref 40.7–50.3)
HEMOGLOBIN: 11.7 G/DL (ref 13–17)
MCH RBC QN AUTO: 30.7 PG (ref 25.2–33.5)
MCHC RBC AUTO-ENTMCNC: 32.3 G/DL (ref 28.4–34.8)
MCV RBC AUTO: 95 FL (ref 82.6–102.9)
NRBC AUTOMATED: 0 PER 100 WBC
PDW BLD-RTO: 11.9 % (ref 11.8–14.4)
PLATELET # BLD: 138 K/UL (ref 138–453)
PMV BLD AUTO: 10.2 FL (ref 8.1–13.5)
RBC # BLD: 3.81 M/UL (ref 4.21–5.77)
WBC # BLD: 9 K/UL (ref 3.5–11.3)

## 2018-03-25 PROCEDURE — 6370000000 HC RX 637 (ALT 250 FOR IP): Performed by: STUDENT IN AN ORGANIZED HEALTH CARE EDUCATION/TRAINING PROGRAM

## 2018-03-25 PROCEDURE — 85027 COMPLETE CBC AUTOMATED: CPT

## 2018-03-25 PROCEDURE — 36415 COLL VENOUS BLD VENIPUNCTURE: CPT

## 2018-03-25 PROCEDURE — 93926 LOWER EXTREMITY STUDY: CPT

## 2018-03-25 PROCEDURE — 6370000000 HC RX 637 (ALT 250 FOR IP): Performed by: INTERNAL MEDICINE

## 2018-03-25 PROCEDURE — 2580000003 HC RX 258: Performed by: INTERNAL MEDICINE

## 2018-03-25 RX ORDER — LISINOPRIL 2.5 MG/1
2.5 TABLET ORAL DAILY
Qty: 30 TABLET | Refills: 3 | Status: SHIPPED | OUTPATIENT
Start: 2018-03-25 | End: 2021-10-11

## 2018-03-25 RX ORDER — LISINOPRIL 2.5 MG/1
2.5 TABLET ORAL DAILY
Status: DISCONTINUED | OUTPATIENT
Start: 2018-03-25 | End: 2018-03-25 | Stop reason: HOSPADM

## 2018-03-25 RX ORDER — METOPROLOL SUCCINATE 25 MG/1
25 TABLET, EXTENDED RELEASE ORAL DAILY
Qty: 30 TABLET | Refills: 3 | Status: SHIPPED | OUTPATIENT
Start: 2018-03-26 | End: 2021-10-11 | Stop reason: ALTCHOICE

## 2018-03-25 RX ADMIN — CLOPIDOGREL 75 MG: 75 TABLET, FILM COATED ORAL at 09:52

## 2018-03-25 RX ADMIN — FINASTERIDE 5 MG: 5 TABLET, FILM COATED ORAL at 09:52

## 2018-03-25 RX ADMIN — Medication 10 ML: at 09:53

## 2018-03-25 ASSESSMENT — PAIN DESCRIPTION - PROGRESSION

## 2018-03-25 ASSESSMENT — PAIN SCALES - GENERAL
PAINLEVEL_OUTOF10: 0

## 2018-03-25 NOTE — PROGRESS NOTES
Dr. Cecelia day stated that will we still need vascular ultra sound ,  Cyndi vascular tech on call was paged and  notified
Findings:     Left main: Normal     LAD: Multiple proximal and mid 80% stenosis. Distal filling from LIMA seen  LIMA - LAD is patent with minimal disease distal to LAD     LCX: Patent proximal and distal stent - co-dominant vessel  OM1: 100% occluded  SVG-OM is 100% occluded     RCA: Proximal - mid 80% stenosis s/p PTCA/SANDY. Distal 80% stenosis s/p PTCA/SANDY  SVG - RCA is occluded 100%     The LV gram was performed in the GERBER 30 position. LVEF: 45%. LV Wall Motion: Inferobasal hypokinesis        Conclusions:  1. Patent LIMA - LAD  2. Occluded SVG - OM and RCA  3. Patent LCX stents  4. New stenosis RCA mid and distal lesion, required PTCA - SANDY reducing stenosis to 0%. 5. Mild LV dysfunction  Patient Active Problem List:     Neuropathy of both feet     LBBB (left bundle branch block)     S/P CABG (coronary artery bypass graft)     Unstable angina (HCC)        Assessment / Acute Cardiac Problems/PLAN:     1. S/p cardiac catheterization- Had 2 new stents (SANDY to mid and distal RCA) placed. Post catheterization protocol. Start ASA and plavix once groin stable. 2. Post cath groin hematoma- Appears stable in size. Recheck hemoglobin. Aarterial US of RLE to r/o pseudoaneurysm is pending  3. Hx of CAD s/p CABG and stents       Discussed with patient and nursing. Viktor Becerril MD  [facility]         Attending Cardiologist Addendum: I have reviewed and performed the history, physical, subjective, objective, assessment, and plan with the resident/fellow and agree with the note. I performed the history and physical personally. I have made changes to the note above as needed. Thank you for allowing me to participate in the care of this patient, please do not hesitate to call if you have any questions. Harish Ramos, [id number] [facility]  [facility]. [facility]  [phone]

## 2018-03-25 NOTE — PLAN OF CARE
Problem: Falls - Risk of  Goal: Absence of falls  Outcome: Met This Shift  Patient calls out appropriately before ambulating. Patient remains free of falls.

## 2018-03-26 LAB
EKG ATRIAL RATE: 62 BPM
EKG P AXIS: 77 DEGREES
EKG P-R INTERVAL: 168 MS
EKG Q-T INTERVAL: 480 MS
EKG QRS DURATION: 124 MS
EKG QTC CALCULATION (BAZETT): 487 MS
EKG R AXIS: 51 DEGREES
EKG T AXIS: 112 DEGREES
EKG VENTRICULAR RATE: 62 BPM

## 2019-03-29 NOTE — DISCHARGE SUMMARY
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    Intervention:  Parenteral formula/solution: Change dextrose/protein content as follows (still continue without lipids):  Parenteral Nutrition Order: Central PN with custom macronutrients and micronutrients and minimum volume without lipids   Access Site: PICC  Calories Provided by Parenteral Nutrition: 1065 kcals/d  Protein Provided by Parenteral Nutrition: 75 g pro/d  Dextrose Provided by Parenteral Nutrition: 225 g dextrose/d  Other Provided by Parenteral Nutrition: Propofol now off--switched to Precedex, Fentanyl, and Versed.    Enteral formula/solution: Advance as follows:  Enteral Nutrition Formula: Renal Formula  Current Rate: 30 ml/hr  Access Site: [place]  Calories Provided by Tube Feedin kcals/d at current rate  Protein Provided by Tube Feedin g pro/d at current rate  Free Water Provided by Tube Feedin ml free water/d at current rate  Other Provided by Tube Feeding CHO/d at current rate  Goal Rate: 55 ml/hr   · Per resident, advance by 10 ml/hr q 24 hrs as tolerated to goal.    Monitoring and Evaluation:   Total energy intake:   Goal to meet estimated nutrient needs via nutrition support, with goal of weaning off PN to EN.  · Tolerating trophic feeds per RN. Surgery ok with slow advancement--ok with 30 ml/hr today and advancing ~10 ml/hr q 24-48 hrs. If pt tolerates advancement of 10 ml/hr q 24 hrs would wean PN further on 3/30 and on 3/31 if able to advance to 50 ml/hr  [facility]  Discharge Note                 Name:  Samra Blair  YOB: 1940  Social Security Number:  [id number]  Medical Record Number:  [id number]    Date of Admission:  3/23/2018  Date of Discharge:  3/25/2018    Admitting physician: Juan Vinson MD    Discharge Attending: Lakisha Cantu CNP  Primary Care Physician: Delma Lira MD  Consultants: NONE  Discharge to [address] [place] [address] LIST:  Patient Active Problem List   Diagnosis    Neuropathy of both feet    LBBB (left bundle branch block)    S/P CABG (coronary artery bypass graft)    Unstable angina ([facility] [address]    S/P cardiac cath         Procedures:cardiac catheterization    HOSPITAL COURSE :           The patient was admitted for worsening angina  Hospital Procedures if any: cardiac cath- 2 SANDY placed in mid and distal RCA. Patient noted to have right groin hematoma on discharge. Arterial US done. Medications changes recommendation: Continue ASA and plavix on discharge  Follow Up Plan: follow up with cardiology in 4 weeks. Discharge exam:   Vitals:    03/25/18 0800   BP: (!) 102/54   Pulse: 68   Resp: 16   Temp: 98.2 °F (36.8 °C)   SpO2:      Neuro: normal  Chest: Clear to ausculation. No wheezing. Cardiac: Regular rate. s1 and s2 auscultated. No murmur noted. Abdomen/groin: soft, non-tender, +groin hematoma- stable on discharge  Lower extremity edema: none     Follow up with primary care provider 1 week  Follow up with cardiology 4 weeks  Follow up with other consultant physicians at their directions.     Discharge Medications:   Wilmon Loll   Home Medication Instructions NPW:[id number]    Printed on:03/25/18 1127   Medication Information                      aspirin 81 MG tablet  Take 81 mg by mouth daily             BACLOFEN EX  Apply 1 applicator topically as needed Indications: compounded oint with gabapentin & lidocaine             clopidogrel (PLAVIX) 75 MG tablet  Take 75 mg by would D/C recommend PN. RD will continue to follow over the weekend.    Group Dietitian Pager [phone]

## 2020-02-17 ENCOUNTER — OFFICE VISIT (OUTPATIENT)
Dept: FAMILY MEDICINE CLINIC | Age: 80
End: 2020-02-17
Payer: COMMERCIAL

## 2020-02-17 ENCOUNTER — HOSPITAL ENCOUNTER (OUTPATIENT)
Age: 80
Setting detail: SPECIMEN
Discharge: HOME | End: 2020-02-17
Payer: MEDICARE

## 2020-02-17 VITALS
HEART RATE: 68 BPM | WEIGHT: 150 LBS | TEMPERATURE: 97.7 F | OXYGEN SATURATION: 95 % | DIASTOLIC BLOOD PRESSURE: 65 MMHG | SYSTOLIC BLOOD PRESSURE: 111 MMHG | BODY MASS INDEX: 21.47 KG/M2

## 2020-02-17 LAB
BILIRUBIN, POC: ABNORMAL
BLOOD URINE, POC: ABNORMAL
CLARITY, POC: ABNORMAL
COLOR, POC: YELLOW
GLUCOSE URINE, POC: ABNORMAL
KETONES, POC: ABNORMAL
LEUKOCYTE EST, POC: ABNORMAL
NITRITE, POC: POSITIVE
PH, POC: 6
PROTEIN, POC: 100
SPECIFIC GRAVITY, POC: 1.02
UROBILINOGEN, POC: 0.2

## 2020-02-17 PROCEDURE — 81003 URINALYSIS AUTO W/O SCOPE: CPT | Performed by: NURSE PRACTITIONER

## 2020-02-17 PROCEDURE — 99202 OFFICE O/P NEW SF 15 MIN: CPT | Performed by: NURSE PRACTITIONER

## 2020-02-17 RX ORDER — ACETAMINOPHEN 500 MG
500 TABLET ORAL
COMMUNITY

## 2020-02-17 RX ORDER — MELATONIN 10 MG
10 CAPSULE ORAL
COMMUNITY

## 2020-02-17 RX ORDER — YOHIMBE BARK 500 MG
CAPSULE ORAL
COMMUNITY

## 2020-02-17 RX ORDER — GABAPENTIN 600 MG/1
TABLET ORAL
COMMUNITY
Start: 2020-02-04 | End: 2021-10-11 | Stop reason: ALTCHOICE

## 2020-02-17 RX ORDER — VALACYCLOVIR HYDROCHLORIDE 1 G/1
1000 TABLET, FILM COATED ORAL PRN
COMMUNITY
Start: 2018-06-16

## 2020-02-17 RX ORDER — CEPHALEXIN 500 MG/1
500 CAPSULE ORAL 3 TIMES DAILY
Qty: 21 CAPSULE | Refills: 0 | Status: SHIPPED | OUTPATIENT
Start: 2020-02-17 | End: 2020-02-23

## 2020-02-17 RX ORDER — CALCIUM CARBONATE 300MG(750)
400 TABLET,CHEWABLE ORAL
COMMUNITY

## 2020-02-17 RX ORDER — FAMOTIDINE 20 MG/1
TABLET, FILM COATED ORAL
COMMUNITY

## 2020-02-17 RX ORDER — GABAPENTIN 300 MG/1
CAPSULE ORAL
COMMUNITY
Start: 2019-12-09 | End: 2020-02-17

## 2020-02-17 ASSESSMENT — ENCOUNTER SYMPTOMS
VOMITING: 0
NAUSEA: 0
DIARRHEA: 0
ABDOMINAL PAIN: 0

## 2020-02-17 NOTE — PROGRESS NOTES
[facility]  [address] [address]  Dept: [phone]  Dept Fax: [phone]    Sander Cunningham a 67 y.o. male who presents to the urgent care today for his medical conditions/complaintsas noted below. Eladio Berg is c/o of Urinary Tract Infection (burning upon urination, frequency - started yesterday - and pt does self cath) and Dizziness (started yesterday)      HPI:     uti symptoms just started yesterday  Here with wife  He has a urology  He straight caths self 2-3 times a day, has for a long time  Denies vomiting, flank pain  States he feels like he has a typical uti    Urinary Tract Infection    This is a new problem. The current episode started yesterday. The problem occurs intermittently. The quality of the pain is described as burning. The pain is mild. There has been no fever. There is no history of pyelonephritis. Associated symptoms include urgency. Pertinent negatives include no discharge, flank pain, hematuria, nausea or vomiting. He has tried nothing for the symptoms. uti, self caths self       Past Medical History:   Diagnosis Date    CAD (coronary artery disease)     Diverticulitis     GERD (gastroesophageal reflux disease)     Hyperlipidemia     Hypertension     Intermittent self-catheterization of bladder     LBBB (left bundle branch block) 3/4/2017    MI (myocardial infarction) (HCC)     Neuropathy     bilat toes    Polio     age 11    Wears glasses        Current Outpatient Medications   Medication Sig Dispense Refill    valACYclovir (VALTREX) 1 g tablet Take 1,000 mg by mouth as needed      Melatonin 10 MG CAPS Take 10 mg by mouth      gabapentin (NEURONTIN) 600 MG tablet       famotidine (PEPCID) 20 MG tablet famotidine 20 mg tablet   Take 1 tablet twice a day by oral route as needed.       acetaminophen (TYLENOL) 500 MG tablet Take 500 mg by mouth      Magnesium 400 MG TABS Take 400 mg by Spec Grav, UA 1.025     Blood, UA POC moderate     pH, UA 6.0     Protein, UA      Urobilinogen, UA 0.2     Leukocytes, UA small     Nitrite, UA positive        Assessment:          Diagnosis Orders   1. Urinary tract infection with hematuria, site unspecified  POCT Urinalysis No Micro (Auto)    Urine Culture    cephALEXin (KEFLEX) 500 MG capsule       Plan:    Increase fluids  Take the keflex as directed  Recheck for worsening, change or concern- fever, chills, vomiting, flank pain, abdominal pain  Follow up with primary care   Call dr. Aly Alberto to schedule follow up for re evaluation  Urine culture is pending  Return for follow up with primary care in 2-3 days, go to the ER for worsening, change or concern. Orders Placed This Encounter   Medications    cephALEXin (KEFLEX) 500 MG capsule     Sig: Take 1 capsule by mouth 3 times daily for 7 days     Dispense:  21 capsule     Refill:  0         Patient given educational materials - see patientinstructions. Discussed use, benefit, and side effects of prescribed medications. All patient questions answered. Pt voiced understanding.     Electronically signed by MAJO Sam 2/17/2020 at 7:31 PM

## 2020-02-21 LAB
CULTURE: ABNORMAL
Lab: ABNORMAL
SPECIMEN DESCRIPTION: ABNORMAL

## 2020-02-23 ENCOUNTER — TELEPHONE (OUTPATIENT)
Dept: FAMILY MEDICINE CLINIC | Age: 80
End: 2020-02-23

## 2020-02-23 RX ORDER — NITROFURANTOIN 25; 75 MG/1; MG/1
100 CAPSULE ORAL 2 TIMES DAILY
Qty: 14 CAPSULE | Refills: 0 | Status: SHIPPED | OUTPATIENT
Start: 2020-02-23 | End: 2020-03-01

## 2020-02-23 NOTE — TELEPHONE ENCOUNTER
I spoke to patients wife and she said Ronny Hughes was feeling better and had said this morning that his urine was clearer. He continues on same antibiotic and has 2 days remaining. He gets confused and was having trouble with taking meds 3x a day. His wife has him on a schedule now so he gets the meds as directed. He has an appt with his urologist 3/11/20.

## 2020-12-28 ENCOUNTER — HOSPITAL ENCOUNTER (EMERGENCY)
Age: 80
Discharge: LEFT BEFORE DISCHARGE | End: 2020-12-28
Payer: MEDICARE

## 2020-12-28 VITALS
BODY MASS INDEX: 22.62 KG/M2 | RESPIRATION RATE: 16 BRPM | HEART RATE: 63 BPM | TEMPERATURE: 97.8 F | DIASTOLIC BLOOD PRESSURE: 56 MMHG | SYSTOLIC BLOOD PRESSURE: 112 MMHG | OXYGEN SATURATION: 98 % | WEIGHT: 158 LBS

## 2021-10-11 ENCOUNTER — HOSPITAL ENCOUNTER (OUTPATIENT)
Dept: GENERAL RADIOLOGY | Age: 81
Discharge: HOME | End: 2021-10-13
Attending: INTERNAL MEDICINE
Payer: MEDICARE

## 2021-10-11 ENCOUNTER — HOSPITAL ENCOUNTER (OUTPATIENT)
Dept: CARDIAC CATH/INVASIVE PROCEDURES | Age: 81
Setting detail: OBSERVATION
Discharge: HOME | End: 2021-10-12
Attending: INTERNAL MEDICINE | Admitting: INTERNAL MEDICINE
Payer: MEDICARE

## 2021-10-11 VITALS
TEMPERATURE: 97.5 F | RESPIRATION RATE: 16 BRPM | OXYGEN SATURATION: 97 % | WEIGHT: 155 LBS | SYSTOLIC BLOOD PRESSURE: 118 MMHG | HEIGHT: 68 IN | HEART RATE: 61 BPM | BODY MASS INDEX: 23.49 KG/M2 | DIASTOLIC BLOOD PRESSURE: 61 MMHG

## 2021-10-11 DIAGNOSIS — Z95.810 S/P ICD (INTERNAL CARDIAC DEFIBRILLATOR) PROCEDURE: ICD-10-CM

## 2021-10-11 LAB
GFR NON-AFRICAN AMERICAN: 58 ML/MIN
GFR SERPL CREATININE-BSD FRML MDRD: >60 ML/MIN
GFR SERPL CREATININE-BSD FRML MDRD: ABNORMAL ML/MIN/{1.73_M2}
GLUCOSE BLD-MCNC: 96 MG/DL (ref 74–100)
PLATELET # BLD: 173 K/UL (ref 138–453)
POC BUN: 18 MG/DL (ref 8–26)
POC CHLORIDE: 108 MMOL/L (ref 98–107)
POC CREATININE: 1.2 MG/DL (ref 0.51–1.19)
POC HEMATOCRIT: 39 % (ref 41–53)
POC HEMOGLOBIN: 13.3 G/DL (ref 13.5–17.5)
POC POTASSIUM: 3.8 MMOL/L (ref 3.5–4.5)
POC SODIUM: 143 MMOL/L (ref 138–146)

## 2021-10-11 PROCEDURE — 84132 ASSAY OF SERUM POTASSIUM: CPT

## 2021-10-11 PROCEDURE — 93005 ELECTROCARDIOGRAM TRACING: CPT | Performed by: STUDENT IN AN ORGANIZED HEALTH CARE EDUCATION/TRAINING PROGRAM

## 2021-10-11 PROCEDURE — 82947 ASSAY GLUCOSE BLOOD QUANT: CPT

## 2021-10-11 PROCEDURE — 1200000000 HC SEMI PRIVATE

## 2021-10-11 PROCEDURE — 99153 MOD SED SAME PHYS/QHP EA: CPT

## 2021-10-11 PROCEDURE — 82435 ASSAY OF BLOOD CHLORIDE: CPT

## 2021-10-11 PROCEDURE — C1894 INTRO/SHEATH, NON-LASER: HCPCS

## 2021-10-11 PROCEDURE — C1777 LEAD, AICD, ENDO SINGLE COIL: HCPCS

## 2021-10-11 PROCEDURE — 33249 INSJ/RPLCMT DEFIB W/LEAD(S): CPT | Performed by: INTERNAL MEDICINE

## 2021-10-11 PROCEDURE — 84295 ASSAY OF SERUM SODIUM: CPT

## 2021-10-11 PROCEDURE — C1892 INTRO/SHEATH,FIXED,PEEL-AWAY: HCPCS

## 2021-10-11 PROCEDURE — 2709999900 HC NON-CHARGEABLE SUPPLY

## 2021-10-11 PROCEDURE — 99152 MOD SED SAME PHYS/QHP 5/>YRS: CPT

## 2021-10-11 PROCEDURE — 82565 ASSAY OF CREATININE: CPT

## 2021-10-11 PROCEDURE — 93005 ELECTROCARDIOGRAM TRACING: CPT | Performed by: INTERNAL MEDICINE

## 2021-10-11 PROCEDURE — 2500000003 HC RX 250 WO HCPCS

## 2021-10-11 PROCEDURE — C1721 AICD, DUAL CHAMBER: HCPCS

## 2021-10-11 PROCEDURE — 93459 L HRT ART/GRFT ANGIO: CPT | Performed by: INTERNAL MEDICINE

## 2021-10-11 PROCEDURE — 85014 HEMATOCRIT: CPT

## 2021-10-11 PROCEDURE — 84520 ASSAY OF UREA NITROGEN: CPT

## 2021-10-11 PROCEDURE — C1898 LEAD, PMKR, OTHER THAN TRANS: HCPCS

## 2021-10-11 PROCEDURE — 2580000003 HC RX 258: Performed by: INTERNAL MEDICINE

## 2021-10-11 PROCEDURE — C1760 CLOSURE DEV, VASC: HCPCS

## 2021-10-11 PROCEDURE — 6360000004 HC RX CONTRAST MEDICATION

## 2021-10-11 PROCEDURE — 6360000002 HC RX W HCPCS

## 2021-10-11 PROCEDURE — C1785 PMKR, DUAL, RATE-RESP: HCPCS

## 2021-10-11 PROCEDURE — 85049 AUTOMATED PLATELET COUNT: CPT

## 2021-10-11 PROCEDURE — 71045 X-RAY EXAM CHEST 1 VIEW: CPT

## 2021-10-11 RX ORDER — TRAZODONE HYDROCHLORIDE 50 MG/1
50 TABLET ORAL NIGHTLY
COMMUNITY

## 2021-10-11 RX ORDER — ONDANSETRON 4 MG/1
4 TABLET, FILM COATED ORAL EVERY 8 HOURS PRN
COMMUNITY

## 2021-10-11 RX ORDER — FINASTERIDE 5 MG/1
5 TABLET, FILM COATED ORAL DAILY
COMMUNITY

## 2021-10-11 RX ORDER — AMITRIPTYLINE HYDROCHLORIDE 25 MG/1
25 TABLET, FILM COATED ORAL NIGHTLY
COMMUNITY

## 2021-10-11 RX ORDER — SODIUM CHLORIDE 9 MG/ML
INJECTION, SOLUTION INTRAVENOUS CONTINUOUS
Status: DISCONTINUED | OUTPATIENT
Start: 2021-10-11 | End: 2021-10-12 | Stop reason: HOSPADM

## 2021-10-11 RX ORDER — SODIUM CHLORIDE 0.9 % (FLUSH) 0.9 %
5-40 SYRINGE (ML) INJECTION EVERY 12 HOURS SCHEDULED
Status: DISCONTINUED | OUTPATIENT
Start: 2021-10-11 | End: 2021-10-12 | Stop reason: HOSPADM

## 2021-10-11 RX ORDER — ROSUVASTATIN CALCIUM 20 MG/1
20 TABLET, COATED ORAL DAILY
COMMUNITY

## 2021-10-11 RX ORDER — SODIUM CHLORIDE 9 MG/ML
25 INJECTION, SOLUTION INTRAVENOUS PRN
Status: DISCONTINUED | OUTPATIENT
Start: 2021-10-11 | End: 2021-10-12 | Stop reason: HOSPADM

## 2021-10-11 RX ORDER — PREGABALIN 100 MG/1
100 CAPSULE ORAL 2 TIMES DAILY
COMMUNITY

## 2021-10-11 RX ORDER — SODIUM CHLORIDE 0.9 % (FLUSH) 0.9 %
5-40 SYRINGE (ML) INJECTION PRN
Status: DISCONTINUED | OUTPATIENT
Start: 2021-10-11 | End: 2021-10-12 | Stop reason: HOSPADM

## 2021-10-11 RX ORDER — ACETAMINOPHEN 325 MG/1
650 TABLET ORAL EVERY 4 HOURS PRN
Status: DISCONTINUED | OUTPATIENT
Start: 2021-10-11 | End: 2021-10-12 | Stop reason: HOSPADM

## 2021-10-11 RX ORDER — SUCRALFATE 1 G/1
1 TABLET ORAL 4 TIMES DAILY
COMMUNITY

## 2021-10-11 RX ORDER — DONEPEZIL HYDROCHLORIDE 10 MG/1
10 TABLET, FILM COATED ORAL 2 TIMES DAILY
COMMUNITY

## 2021-10-11 RX ADMIN — SODIUM CHLORIDE: 9 INJECTION, SOLUTION INTRAVENOUS at 12:47

## 2021-10-11 NOTE — H&P
[facility]  Procedure History and Physical Update          Patient Name: Jose Daniel Cantu  MRN:    [id number]  YOB: 1940  Date of evaluation:  10/11/2021    Procedure:    Cardiac cath +/- PCI    Indication for procedure:  Angina      Please refer to the office note completed by Dr. Charmaine Raines on 10/8/21 in the medical record and note that:    [x] I have examined the patient and reviewed the H&P/Consult and there are no changes to be made to the assessment or plan. [] I have examined the patient and reviewed the H&P/Consult and have noted the following changes:    Past Medical History:   Diagnosis Date    CAD (coronary artery disease)     Diverticulitis     GERD (gastroesophageal reflux disease)     Hyperlipidemia     Hypertension     Intermittent self-catheterization of bladder     LBBB (left bundle branch block) 3/4/2017    MI [facility]myocardial infarction) ([facility] [address]     Neuropathy     bilat toes    Polio     age 11    Wears glasses        Past Surgical History:   Procedure Laterality Date    CARDIAC SURGERY  2000    CABG    COLONOSCOPY      CORONARY ANGIOPLASTY WITH STENT PLACEMENT      4 stents    CORONARY ARTERY BYPASS GRAFT  2000    triple vessel    ENDOSCOPY, COLON, DIAGNOSTIC         No family history on file. Allergies   Allergen Reactions    Codeine Other (See Comments)     insomia & tachycardia    Morphine      Pt states he has problems with his bp when given morphine    Pletal [Cilostazol]     Sulfamethoxazole-Trimethoprim     Pcn [Penicillins] Rash       Prior to Admission medications    Medication Sig Start Date End Date Taking?  Authorizing Provider   valACYclovir (VALTREX) 1 g tablet Take 1,000 mg by mouth as needed 6/16/18   Historical Provider, MD   Melatonin 10 MG CAPS Take 10 mg by mouth    Historical Provider, MD   gabapentin (NEURONTIN) 600 MG tablet  2/4/20   Historical Provider, MD   famotidine (PEPCID) 20 MG tablet famotidine 20 mg tablet   Take 1 tablet twice a day by oral route as needed. Historical Provider, MD   acetaminophen (TYLENOL) 500 MG tablet Take 500 mg by mouth    Historical Provider, MD   Magnesium 400 MG TABS Take 400 mg by mouth    Historical Provider, MD   Lactobacillus (ACIDOPHILUS) 100 MG CAPS Take by mouth    Historical Provider, MD   vitamin D-3 (CHOLECALCIFEROL) 125 MCG (5000 UT) TABS Take 5,000 Units by mouth    Historical Provider, MD   Cyanocobalamin 2500 MCG TABS Take 5,000 mcg by mouth    Historical Provider, MD   metoprolol succinate (TOPROL XL) 25 MG extended release tablet Take 1 tablet by mouth daily  Patient not taking: Reported on 2/17/2020 3/26/18   Rosangela Alejandre MD   lisinopril (PRINIVIL;ZESTRIL) 2.5 MG tablet Take 1 tablet by mouth daily  Patient not taking: Reported on 2/17/2020 3/25/18   Rosangela Alejandre MD   nitroGLYCERIN (NITROSTAT) 0.4 MG SL tablet Place 0.4 mg under the tongue every 5 minutes as needed for Chest pain up to max of 3 total doses. If no relief after 1 dose, call 911. Historical Provider, MD   dutasteride (AVODART) 0.5 MG capsule Take 0.5 mg by mouth daily    Historical Provider, MD   simvastatin (ZOCOR) 20 MG tablet Take 20 mg by mouth nightly    Historical Provider, MD   clopidogrel (PLAVIX) 75 MG tablet Take 75 mg by mouth daily    Historical Provider, MD   aspirin 81 MG tablet Take 81 mg by mouth daily    Historical Provider, MD   Diphenhydramine-APAP, sleep, (TYLENOL PM EXTRA STRENGTH PO) Take 1 tablet by mouth as needed     Historical Provider, MD   mupirocin (BACTROBAN) 2 % ointment Apply 1 each topically 3 times daily Apply topically 3 times daily. Historical Provider, MD         There were no vitals filed for this visit. Constitutional and General Appearance:   alert, cooperative, no distress and appears stated age  [de-identified]:  · PERRL, EOMI  Respiratory:  · Normal excursion and expansion without use of accessory muscles  · Resp Auscultation:  Good respiratory effort.  No for increased work of breathing. On auscultation: clear to auscultation bilaterally  Cardiovascular:  · Regular rate and rhythm. · S1/S2  · No murmurs. · The apical impulse is not displaced  Abdomen:  · Soft  · Bowel sounds present  · Non-tender to palpation  Extremities:  · No cyanosis or clubbing  · Lower extremity edema: No.  Skin:  · Warm and dry  Neurological:  · Alert and oriented. DATA:  Most recent Cath 3/23/18  Patent LIMA - LAD, Occluded SVG - OM and RCA, Patent LCX stents, New stenosis RCA mid and distal lesion, required PTCA - SANDY reducing stenosis to 0%. Mild LV dysfunction  Recommendations    Post stent protocol   Short DAPT study due to, low Hgb and recent dx of Diverticultis. Plan:  · Proceed with planned procedure. · Further orders to follow. Risks, benefits, and alternatives of cardiac catheterization were discussed, in detail, with patient. Risks include, but not limited to, bleeding, requiring blood transfusion, vascular complication requiring surgery, renal failure with need of dialysis, CVA, MI, death and anesthesia complications including intubation were discussed. Patient verbalized understanding and agreed to proceed with the procedure understanding the above risks and alternatives to the procedure. Risks, benefits, alternatives, and details discussed extensively. Accepts and consents.       Pre Procedure Conscious Sedation Data:  ASA Class:                  [] I [] II [x] III [] IV     Mallampati Class:       [] I [] II [x] III [] Ceci Lakhani MD  Fellow, [address]

## 2021-10-11 NOTE — PROGRESS NOTES
Head of bed elevated. Patient takes own medications: rovustatin, plavix, pregabalin,finasteride, donepezil, pepcid and magnesium. Eating dinner with assist of wife.

## 2021-10-11 NOTE — OP NOTE
PM by:    Aamir Enriquez MD  Fellow, [address] [facility]      I have reviewed the case / procedure with resident / fellow  I have examined the patient personally  Patient agree with treatment plan as discussed before, final arrangement based on my evaluation and exam.    Risk and benefit of procedure planned were explained in details. Procedure was performed by me personally, with all aspect of the procedure being done using standard protocol. Note was modified based on my own assessment and treatment.     Ruel Lin MD  [facility]

## 2021-10-11 NOTE — PROGRESS NOTES
Patient admitted, consent signed, all questions answered. Pt ready for procedure. Bed in low position, call light to reach with side rails up 2 of 2. Right wrist and bilateral groin hair clipped. Wife and son at bedside with patient.

## 2021-10-11 NOTE — PROGRESS NOTES
Received post procedure to [facility] to room 12. Assessment obtained. Restrictions reviewed with patient. Post procedure pathway initiated. Right groin site soft , band aid dry and intact. No hematoma noted. Family at side. Patient without complaints. Head of bed flat with right leg straight. Dressing intact to left anterior chest wall. Sling intact to left arm.

## 2021-10-11 NOTE — OP NOTE
[facility]                Procedure Note  Dual Chamber ICD       Lisa Becerril (10 y.o., male)  1940      10/11/2021      Procedure: DC-AICD implantation    Operators:  Primary: Jaye Morgan MD     Indication: ICM EF < 35%      AICD / CRT Indication: Pre procedure review    Documentation to support the medical necessity f procedure. Reason for placement:     [x] Initial [] Recall/Malfunction  [] Upgrade  [] Beyond useful life limit    EF measured by:   [x] Echo [] Stress Test  [] Muga  [] Angiography    For Secondary prevention - Complete this section. A.   [] Documented episodes of cardiac arrest due to V.Fib, not due to transient reversible causes, with no irreversible brain damage   (NCD Covered indication I)  [] Documented sustained VT, either spontaneous or induced by EP study, not associated with an acute MI and notdue to a transient or reversible cause; and patient does not have irreversible brain damage from pre existing cerebral disease. (NCD Covered Insication 2). Docment as  []  Ischemic. [] Non Ischemic. For Primary prevention Patients - Complete Section B & C. Section B.  [] Documented familial or inherited conditions with a high risk of life threatening VT (Long QT, HOC). (Cpovered indication 3)  [] Coronary artery disease with documented prior MI annd LVEF < 35%, and inducivble sustained VT or VF at EP study. (NCD Covered Indication 4). [] Documented prior MI and LVEF < 30%. (NCD Covered Indication 5)  [x] Ischemic dilated CM (IDCM), documented prior MI, NYHA class II/III heart failure, and EF < 35%. (NCD Covered Indication 6). [] NIDCM > 3 months, NYHA class II/III heart failure, and LVEF < 35%   (NCD Covered Indication 7 & 9). [] Documented prior MI and NYHA class IV heart failure and meets all current medicare coverage for CRT device. Section C.  (if one or more box is checked, then patient does not meet NCD coverage requirements for primary prevention)  [] NYHA class IV (Covered Indications 4 and 8)  [] Cardiogenic shock or symptomatic hypotension while in a stable baseline rhythm. [] CABG or PTCA within past 3 months  [] Acute MI within past 40 days. [] Patient is unable to give informed consent. [] Symptoms / Findings making them a candidate for coronary revascularization. [] Irreversible brain damage from pre existing cerebral disease.  [] Any disease, other than cardiac disease, with likely survival less than 1 year.  / Device Data:        Name    [employer] 10/11/21   [facility] # Serial #   [place] [id number]   RA-Lead [id number]   RV-Lead [id number]   LV-Lead N/A N/A       ATRIUM R VENT L VENT   P waves 1.6 mV R waves: 8.7 mV R waves: N/A   Threshold: 1.5/0. 4 Threshold: 0.75/0.4 Threshold: N/A   Impedance: 785 Impedance: 741 Impedance:  N/A       [x] Sedation monitoring  [] Left Subclavian Angiogram   [x] RV lead   [x] RA lead   [] LV lead   [x] Intra - OP Lead Testing  [] Coronary Sinus Angiogram   [x] Pocket   [x] Generators Implant  [x] Fluoro time: 2.3 min      Procedure  After the usual preparation of the left neck and chest, the patient was draped in the usual sterile manner. Local anesthesia was infused below the left clavicle from the midline laterally. An incision was made inferior and parallel to the clavicle. The incision was carried down to the fascia. A pocket was formed inferior using blunt dissection. A thin walled 18 gauge needle was used to puncture the left subclavian vein using the modified Seldinger technique. A guide wire was passed into the right heart under fluoroscopic control. This was repeated with a second guide wire. RV Lead Implant:  A 9.0 [place] sheath was then passed over the guide wire and the guide wire removed. The ventricular lead was advanced through the sheath into the right heart.  The lead was then positioned in the right ventricle under fluoroscopic control. The acute pacing and sensing thresholds were measured and found to be satisfactory. RA Lead Implant:  A second 7.0 [place] sheath was then passed over the guide wire and the guide wire removed. The atrial lead was advanced into the right heart. The lead was then positioned in the right atrium. The acute pacing and sensing thresholds were measured and found to be acceptable. The two sheaths were removed and the leads were secured to the fascia. Generator: The implanted leads were attached to the AICD using the setscrews. The pocket was irrigated with antibiotic solution. The pulse generator and leads were coiled and placed in the pocket. Fluoroscopy was used to verify the final placement of the pacemaker and leads. The pocket was closed using multiple layers of suture and a dry sterile dressing was applied. There were no complications, patient tolerated the procedure well. The patient left the EP lab in stable condition. Impression / Device:  Successful Implantation of: DC-AICD  Estimated blood loss less than 25 ml    Plan:  Telemetry monitoring  Interrogate pacemaker before discharge  CXR if needed  Wound check at [facility] in 7 days  Discharge if patient remains stable    Patient instructed to no showering or get the wound wet for 1 week, no driving for 1 week, no lifting more than 10 pounds for 4 weeks, no arm raising above the shoulder for 4 weeks. No lovenox or heparin at any dose is to be given    Electronically signed by Virginia Singh MD on 10/11/2021 at 5:20 PM      I have reviewed the case / procedure with resident / fellow  I have examined the patient personally  Patient agree with treatment plan as discussed before, final arrangement based on my evaluation and exam.    Risk and benefit of procedure planned were explained in details.     Procedure was performed by me personally, with all aspect of the procedure being done using standard protocol. Note was modified based on my own assessment and treatment.     Jaye Morgan MD  [facility]

## 2021-10-12 LAB
ALBUMIN SERPL-MCNC: 4.1 G/DL (ref 3.5–5.2)
ALBUMIN/GLOBULIN RATIO: 1.5 (ref 1–2.5)
ALP BLD-CCNC: 82 U/L (ref 40–129)
ALT SERPL-CCNC: 13 U/L (ref 5–41)
ANION GAP SERPL CALCULATED.3IONS-SCNC: 13 MMOL/L (ref 9–17)
AST SERPL-CCNC: 20 U/L
BILIRUB SERPL-MCNC: 0.9 MG/DL (ref 0.3–1.2)
BUN BLDV-MCNC: 23 MG/DL (ref 8–23)
BUN/CREAT BLD: ABNORMAL (ref 9–20)
CALCIUM SERPL-MCNC: 9.1 MG/DL (ref 8.6–10.4)
CHLORIDE BLD-SCNC: 103 MMOL/L (ref 98–107)
CO2: 22 MMOL/L (ref 20–31)
CREAT SERPL-MCNC: 1.34 MG/DL (ref 0.7–1.2)
EKG ATRIAL RATE: 49 BPM
EKG ATRIAL RATE: 60 BPM
EKG P AXIS: -26 DEGREES
EKG P AXIS: 62 DEGREES
EKG P-R INTERVAL: 164 MS
EKG P-R INTERVAL: 184 MS
EKG Q-T INTERVAL: 506 MS
EKG Q-T INTERVAL: 516 MS
EKG QRS DURATION: 136 MS
EKG QRS DURATION: 154 MS
EKG QTC CALCULATION (BAZETT): 466 MS
EKG QTC CALCULATION (BAZETT): 506 MS
EKG R AXIS: -85 DEGREES
EKG R AXIS: 1 DEGREES
EKG T AXIS: 83 DEGREES
EKG T AXIS: 91 DEGREES
EKG VENTRICULAR RATE: 49 BPM
EKG VENTRICULAR RATE: 60 BPM
GFR AFRICAN AMERICAN: >60 ML/MIN
GFR NON-AFRICAN AMERICAN: 51 ML/MIN
GFR SERPL CREATININE-BSD FRML MDRD: ABNORMAL ML/MIN/{1.73_M2}
GFR SERPL CREATININE-BSD FRML MDRD: ABNORMAL ML/MIN/{1.73_M2}
GLUCOSE BLD-MCNC: 124 MG/DL (ref 70–99)
HCT VFR BLD CALC: 38.5 % (ref 40.7–50.3)
HEMOGLOBIN: 12.2 G/DL (ref 13–17)
MCH RBC QN AUTO: 30.3 PG (ref 25.2–33.5)
MCHC RBC AUTO-ENTMCNC: 31.7 G/DL (ref 28.4–34.8)
MCV RBC AUTO: 95.8 FL (ref 82.6–102.9)
NRBC AUTOMATED: 0 PER 100 WBC
PDW BLD-RTO: 12.6 % (ref 11.8–14.4)
PLATELET # BLD: 178 K/UL (ref 138–453)
PMV BLD AUTO: 11.8 FL (ref 8.1–13.5)
POTASSIUM SERPL-SCNC: 4 MMOL/L (ref 3.7–5.3)
RBC # BLD: 4.02 M/UL (ref 4.21–5.77)
SODIUM BLD-SCNC: 138 MMOL/L (ref 135–144)
TOTAL PROTEIN: 6.8 G/DL (ref 6.4–8.3)
WBC # BLD: 9.8 K/UL (ref 3.5–11.3)

## 2021-10-12 PROCEDURE — 85027 COMPLETE CBC AUTOMATED: CPT

## 2021-10-12 PROCEDURE — 36415 COLL VENOUS BLD VENIPUNCTURE: CPT

## 2021-10-12 PROCEDURE — 80053 COMPREHEN METABOLIC PANEL: CPT

## 2021-10-12 NOTE — DISCHARGE SUMMARY
[facility]  Discharge Note                 Name:  Robbie Elliott  YOB: 1940  Social Security Number:  [id number]  Medical Record Number:  [id number]    Date of Admission:  10/12/2021 correction 10/11/2021  Date of Discharge:  10/12/2021 correction 10/13/2021    Admitting physician: Charlie Whitaker MD    Discharge Attending: MAJO Hernandez NP, CNP  Primary Care Physician: Nicole Avery MD  Consultants: Cardiology  Discharge to Home in stable condition    HOSPITAL ADMISSION PROBLEM LIST:  Patient Active Problem List   Diagnosis    Neuropathy of both feet    LBBB (left bundle branch block)    S/P CABG (coronary artery bypass graft)    Unstable angina (HCC)    S/P cardiac cath    S/P ICD (internal cardiac defibrillator) procedure         Procedures: AICD implant    HOSPITAL COURSE :           The patient was admitted for: [facility] Procedures if any: AICD and cardiac catheterization  Medications changes recommendation: see medication list  Follow Up Plan: 2 week follow up      Discharge exam:   Vitals:    10/11/21 2315   BP: 118/61   Pulse: 61   Resp:    Temp:    SpO2:      Neuro: normal  Chest: Clear to ausculation. No wheezing. Cardiac: Regular rate. s1 and s2 auscultated. No murmur noted. Abdomen/groin: soft, non-tender, without masses or organomegaly  Lower extremity edema: none  Left upper chest: CDI with mild swelling noted. Staples intact. Compression dressing placed and ice recommended. Right Femoral artery site:  CDI with minimal ecchymosis. Soft + Pulse.       Discharge Medications:   Yenni Price   Home Medication Instructions ZYB:[id number]    Printed on:10/12/21 1607     Medication Information                        acetaminophen (TYLENOL) 500 MG tablet  Take 500 mg by mouth             amitriptyline (ELAVIL) 25 MG tablet  Take 25 mg by mouth nightly             clopidogrel (PLAVIX) 75 MG tablet  Take 75 mg by mouth daily Cyanocobalamin 2500 MCG TABS  Take 5,000 mcg by mouth             Diphenhydramine-APAP, sleep, (TYLENOL PM EXTRA STRENGTH PO)  Take 1 tablet by mouth as needed              donepezil (ARICEPT) 10 MG tablet  Take 10 mg by mouth 2 times daily             famotidine (PEPCID) 20 MG tablet  famotidine 20 mg tablet   Take 1 tablet twice a day by oral route as needed. finasteride (PROSCAR) 5 MG tablet  Take 5 mg by mouth daily             Lactobacillus (ACIDOPHILUS) 100 MG CAPS  Take by mouth             Magnesium 400 MG TABS  Take 400 mg by mouth             Melatonin 10 MG CAPS  Take 10 mg by mouth             mupirocin (BACTROBAN) 2 % ointment  Apply 1 each topically 3 times daily Apply topically 3 times daily. nitroGLYCERIN (NITROSTAT) 0.4 MG SL tablet  Place 0.4 mg under the tongue every 5 minutes as needed for Chest pain up to max of 3 total doses. If no relief after 1 dose, call 911. ondansetron (ZOFRAN) 4 MG tablet  Take 4 mg by mouth every 8 hours as needed for Nausea or Vomiting             pregabalin (LYRICA) 100 MG capsule  Take 100 mg by mouth 2 times daily. rosuvastatin (CRESTOR) 20 MG tablet  Take 20 mg by mouth daily             sucralfate (CARAFATE) 1 GM tablet  Take 1 g by mouth 4 times daily             traZODone (DESYREL) 50 MG tablet  Take 50 mg by mouth nightly             valACYclovir (VALTREX) 1 g tablet  Take 1,000 mg by mouth as needed             vitamin D-3 (CHOLECALCIFEROL) 125 MCG (5000 UT) TABS  Take 5,000 Units by mouth                AICD IMPLANT 10/11/2021     Impression / Device:  Successful Implantation of: DC-AICD  Estimated blood loss less than 25 ml     Plan:  Telemetry monitoring  Interrogate pacemaker before discharge  CXR if needed  Wound check at [facility] in 7 days  Discharge if patient remains stable    CATH 10/11/2021  Findings:  LMCA: Normal 0% stenosis.      LAD: Mid 100% stenosis   LIMA -LAD is patent     LCx: Patent native stent OM1: 100% stenosis     RCA: Mild irregularities 20-30%. PDA stent is patent      Coronary Tree        Dominance: Mixed       LV Analysis   LV function assessed as:Abnormal.      The LV gram was performed in the GERBER 30 position. LVEF: 30%. Conclusions:   Multivessel CAD    PAtent LIMA -LAD    PAtent LCX stents    Patent RCA / PDA stents    Severe LV dysfunction, with symptomatic bradycardia        Recommendations        Medical treatments    Proceed with dual chambers AICD     [employer] REP NOTE  10/12/2021  ICD functioning appropriately  No changes  Dr Usama Rankin notified. CXR 10/11/2021  No pneumothorax status post pacemaker placement    Patient seen and examined in room. Denies chest discomfort or SOB. Discussed in detail with patient post procedure activity restriction including but not limited to site care, diet, exercise/activity restrictions, lifting and arm movement restrictions, pain control, use of ice for swelling, and follow-up. Questions/concerns addressed in detail. OK for d/c home. F/U in office as scheduled in 1 week for wound check. Discussed with patient and family and nursing. Medications and discharge instructions reviewed with patient and nursing.     Electronically signed by MAJO Hernandez NP on 10/12/2021 at 4:03 PM  [facility]      [phone]

## 2022-12-09 ENCOUNTER — APPOINTMENT (OUTPATIENT)
Dept: CT IMAGING | Age: 82
DRG: 374 | End: 2022-12-09
Payer: MEDICARE

## 2022-12-09 ENCOUNTER — HOSPITAL ENCOUNTER (INPATIENT)
Age: 82
LOS: 11 days | Discharge: HOSPICE | DRG: 374 | End: 2022-12-20
Attending: EMERGENCY MEDICINE | Admitting: FAMILY MEDICINE
Payer: MEDICARE

## 2022-12-09 DIAGNOSIS — R62.7 FAILURE TO THRIVE IN ADULT: ICD-10-CM

## 2022-12-09 DIAGNOSIS — C15.5 MALIGNANT NEOPLASM OF LOWER THIRD OF ESOPHAGUS (HCC): ICD-10-CM

## 2022-12-09 DIAGNOSIS — R18.8 OTHER ASCITES: ICD-10-CM

## 2022-12-09 DIAGNOSIS — K57.30 DIVERTICULOSIS OF COLON: ICD-10-CM

## 2022-12-09 DIAGNOSIS — R13.10 DYSPHAGIA, UNSPECIFIED TYPE: ICD-10-CM

## 2022-12-09 DIAGNOSIS — R10.30 LOWER ABDOMINAL PAIN: Primary | ICD-10-CM

## 2022-12-09 LAB
ABSOLUTE EOS #: <0.03 K/UL (ref 0–0.44)
ABSOLUTE IMMATURE GRANULOCYTE: 0.07 K/UL (ref 0–0.3)
ABSOLUTE LYMPH #: 0.96 K/UL (ref 1.1–3.7)
ABSOLUTE MONO #: 1.28 K/UL (ref 0.1–1.2)
ANION GAP SERPL CALCULATED.3IONS-SCNC: 11 MMOL/L (ref 9–17)
BASOPHILS # BLD: 0 % (ref 0–2)
BASOPHILS ABSOLUTE: <0.03 K/UL (ref 0–0.2)
BUN BLDV-MCNC: 12 MG/DL (ref 8–23)
BUN/CREAT BLD: 12 (ref 9–20)
CALCIUM SERPL-MCNC: 8.4 MG/DL (ref 8.6–10.4)
CHLORIDE BLD-SCNC: 98 MMOL/L (ref 98–107)
CO2: 29 MMOL/L (ref 20–31)
CREAT SERPL-MCNC: 1.01 MG/DL (ref 0.7–1.2)
EOSINOPHILS RELATIVE PERCENT: 0 % (ref 1–4)
FLU A ANTIGEN: NEGATIVE
FLU B ANTIGEN: NEGATIVE
GFR SERPL CREATININE-BSD FRML MDRD: >60 ML/MIN/1.73M2
GLUCOSE BLD-MCNC: 122 MG/DL (ref 70–99)
HCT VFR BLD CALC: 39.7 % (ref 40.7–50.3)
HEMOGLOBIN: 12.7 G/DL (ref 13–17)
IMMATURE GRANULOCYTES: 1 %
INR BLD: 1.2
LIPASE: 29 U/L (ref 13–60)
LYMPHOCYTES # BLD: 9 % (ref 24–43)
MCH RBC QN AUTO: 28.4 PG (ref 25.2–33.5)
MCHC RBC AUTO-ENTMCNC: 32 G/DL (ref 28.4–34.8)
MCV RBC AUTO: 88.8 FL (ref 82.6–102.9)
MONOCYTES # BLD: 12 % (ref 3–12)
NRBC AUTOMATED: 0 PER 100 WBC
PARTIAL THROMBOPLASTIN TIME: 30.3 SEC (ref 23.9–33.8)
PDW BLD-RTO: 13.6 % (ref 11.8–14.4)
PLATELET # BLD: 351 K/UL (ref 138–453)
PMV BLD AUTO: 10.7 FL (ref 8.1–13.5)
POTASSIUM SERPL-SCNC: 3.9 MMOL/L (ref 3.7–5.3)
PROTHROMBIN TIME: 15.2 SEC (ref 11.5–14.2)
RBC # BLD: 4.47 M/UL (ref 4.21–5.77)
SARS-COV-2, RAPID: NOT DETECTED
SEG NEUTROPHILS: 78 % (ref 36–65)
SEGMENTED NEUTROPHILS ABSOLUTE COUNT: 7.94 K/UL (ref 1.5–8.1)
SODIUM BLD-SCNC: 138 MMOL/L (ref 135–144)
SPECIMEN DESCRIPTION: NORMAL
WBC # BLD: 10.3 K/UL (ref 3.5–11.3)

## 2022-12-09 PROCEDURE — 87804 INFLUENZA ASSAY W/OPTIC: CPT

## 2022-12-09 PROCEDURE — 83690 ASSAY OF LIPASE: CPT

## 2022-12-09 PROCEDURE — 6360000002 HC RX W HCPCS

## 2022-12-09 PROCEDURE — 2060000000 HC ICU INTERMEDIATE R&B

## 2022-12-09 PROCEDURE — 6360000004 HC RX CONTRAST MEDICATION

## 2022-12-09 PROCEDURE — 87635 SARS-COV-2 COVID-19 AMP PRB: CPT

## 2022-12-09 PROCEDURE — 74177 CT ABD & PELVIS W/CONTRAST: CPT

## 2022-12-09 PROCEDURE — 99285 EMERGENCY DEPT VISIT HI MDM: CPT

## 2022-12-09 PROCEDURE — 96374 THER/PROPH/DIAG INJ IV PUSH: CPT

## 2022-12-09 PROCEDURE — 80048 BASIC METABOLIC PNL TOTAL CA: CPT

## 2022-12-09 PROCEDURE — 85025 COMPLETE CBC W/AUTO DIFF WBC: CPT

## 2022-12-09 PROCEDURE — 85610 PROTHROMBIN TIME: CPT

## 2022-12-09 PROCEDURE — 2580000003 HC RX 258: Performed by: EMERGENCY MEDICINE

## 2022-12-09 PROCEDURE — 2580000003 HC RX 258

## 2022-12-09 PROCEDURE — 96361 HYDRATE IV INFUSION ADD-ON: CPT

## 2022-12-09 PROCEDURE — 85730 THROMBOPLASTIN TIME PARTIAL: CPT

## 2022-12-09 RX ORDER — SODIUM CHLORIDE 0.9 % (FLUSH) 0.9 %
5-40 SYRINGE (ML) INJECTION PRN
Status: DISCONTINUED | OUTPATIENT
Start: 2022-12-09 | End: 2022-12-15 | Stop reason: SDUPTHER

## 2022-12-09 RX ORDER — 0.9 % SODIUM CHLORIDE 0.9 %
1000 INTRAVENOUS SOLUTION INTRAVENOUS ONCE
Status: COMPLETED | OUTPATIENT
Start: 2022-12-09 | End: 2022-12-09

## 2022-12-09 RX ORDER — ONDANSETRON 2 MG/ML
4 INJECTION INTRAMUSCULAR; INTRAVENOUS EVERY 6 HOURS PRN
Status: DISCONTINUED | OUTPATIENT
Start: 2022-12-09 | End: 2022-12-20 | Stop reason: HOSPADM

## 2022-12-09 RX ORDER — ACETAMINOPHEN 325 MG/1
650 TABLET ORAL EVERY 6 HOURS PRN
Status: DISCONTINUED | OUTPATIENT
Start: 2022-12-09 | End: 2022-12-20 | Stop reason: HOSPADM

## 2022-12-09 RX ORDER — SODIUM CHLORIDE 9 MG/ML
INJECTION, SOLUTION INTRAVENOUS PRN
Status: DISCONTINUED | OUTPATIENT
Start: 2022-12-09 | End: 2022-12-09 | Stop reason: SDUPTHER

## 2022-12-09 RX ORDER — ACETAMINOPHEN 650 MG/1
650 SUPPOSITORY RECTAL EVERY 6 HOURS PRN
Status: DISCONTINUED | OUTPATIENT
Start: 2022-12-09 | End: 2022-12-20 | Stop reason: HOSPADM

## 2022-12-09 RX ORDER — SODIUM CHLORIDE 0.9 % (FLUSH) 0.9 %
5-40 SYRINGE (ML) INJECTION EVERY 12 HOURS SCHEDULED
Status: DISCONTINUED | OUTPATIENT
Start: 2022-12-09 | End: 2022-12-15 | Stop reason: SDUPTHER

## 2022-12-09 RX ORDER — SODIUM CHLORIDE 9 MG/ML
INJECTION, SOLUTION INTRAVENOUS CONTINUOUS
Status: DISCONTINUED | OUTPATIENT
Start: 2022-12-09 | End: 2022-12-10

## 2022-12-09 RX ORDER — MAGNESIUM SULFATE 1 G/100ML
1000 INJECTION INTRAVENOUS PRN
Status: DISCONTINUED | OUTPATIENT
Start: 2022-12-09 | End: 2022-12-20 | Stop reason: HOSPADM

## 2022-12-09 RX ORDER — POTASSIUM CHLORIDE 20 MEQ/1
40 TABLET, EXTENDED RELEASE ORAL PRN
Status: DISCONTINUED | OUTPATIENT
Start: 2022-12-09 | End: 2022-12-10

## 2022-12-09 RX ORDER — SODIUM CHLORIDE 9 MG/ML
INJECTION, SOLUTION INTRAVENOUS PRN
Status: DISCONTINUED | OUTPATIENT
Start: 2022-12-09 | End: 2022-12-15 | Stop reason: SDUPTHER

## 2022-12-09 RX ORDER — SODIUM CHLORIDE 0.9 % (FLUSH) 0.9 %
10 SYRINGE (ML) INJECTION ONCE
Status: COMPLETED | OUTPATIENT
Start: 2022-12-09 | End: 2022-12-09

## 2022-12-09 RX ORDER — POTASSIUM CHLORIDE 7.45 MG/ML
10 INJECTION INTRAVENOUS PRN
Status: DISCONTINUED | OUTPATIENT
Start: 2022-12-09 | End: 2022-12-10

## 2022-12-09 RX ORDER — SODIUM CHLORIDE 0.9 % (FLUSH) 0.9 %
10 SYRINGE (ML) INJECTION PRN
Status: DISCONTINUED | OUTPATIENT
Start: 2022-12-09 | End: 2022-12-15 | Stop reason: SDUPTHER

## 2022-12-09 RX ORDER — POLYETHYLENE GLYCOL 3350 17 G/17G
17 POWDER, FOR SOLUTION ORAL DAILY PRN
Status: DISCONTINUED | OUTPATIENT
Start: 2022-12-09 | End: 2022-12-20 | Stop reason: HOSPADM

## 2022-12-09 RX ORDER — ONDANSETRON 4 MG/1
4 TABLET, ORALLY DISINTEGRATING ORAL EVERY 8 HOURS PRN
Status: DISCONTINUED | OUTPATIENT
Start: 2022-12-09 | End: 2022-12-20 | Stop reason: HOSPADM

## 2022-12-09 RX ORDER — ONDANSETRON 2 MG/ML
4 INJECTION INTRAMUSCULAR; INTRAVENOUS ONCE
Status: COMPLETED | OUTPATIENT
Start: 2022-12-09 | End: 2022-12-09

## 2022-12-09 RX ORDER — 0.9 % SODIUM CHLORIDE 0.9 %
80 INTRAVENOUS SOLUTION INTRAVENOUS ONCE
Status: COMPLETED | OUTPATIENT
Start: 2022-12-09 | End: 2022-12-09

## 2022-12-09 RX ADMIN — SODIUM CHLORIDE 80 ML: 9 INJECTION, SOLUTION INTRAVENOUS at 18:22

## 2022-12-09 RX ADMIN — SODIUM CHLORIDE, PRESERVATIVE FREE 10 ML: 5 INJECTION INTRAVENOUS at 18:23

## 2022-12-09 RX ADMIN — IOPAMIDOL 75 ML: 755 INJECTION, SOLUTION INTRAVENOUS at 18:22

## 2022-12-09 RX ADMIN — SODIUM CHLORIDE 100 ML/HR: 9 INJECTION, SOLUTION INTRAVENOUS at 22:21

## 2022-12-09 RX ADMIN — ONDANSETRON HYDROCHLORIDE 4 MG: 2 INJECTION, SOLUTION INTRAVENOUS at 17:52

## 2022-12-09 RX ADMIN — SODIUM CHLORIDE 1000 ML: 9 INJECTION, SOLUTION INTRAVENOUS at 17:49

## 2022-12-09 ASSESSMENT — PAIN - FUNCTIONAL ASSESSMENT: PAIN_FUNCTIONAL_ASSESSMENT: 0-10

## 2022-12-09 ASSESSMENT — PAIN DESCRIPTION - DESCRIPTORS: DESCRIPTORS: DISCOMFORT

## 2022-12-09 ASSESSMENT — PAIN SCALES - GENERAL: PAINLEVEL_OUTOF10: 8

## 2022-12-09 ASSESSMENT — ENCOUNTER SYMPTOMS
SHORTNESS OF BREATH: 0
DIARRHEA: 1
BLOOD IN STOOL: 0
CONSTIPATION: 0
NAUSEA: 1
ABDOMINAL PAIN: 1
VOMITING: 1

## 2022-12-09 ASSESSMENT — PAIN DESCRIPTION - LOCATION: LOCATION: ABDOMEN

## 2022-12-09 NOTE — ED NOTES
EMERGENCY DEPARTMENT ENCOUNTER    Pt Name: Parrish Chauhan  MRN: [id number]  [place] 1940  Date of evaluation: 12/9/22  CHIEF COMPLAINT       Chief Complaint   Patient presents with    Abdominal Pain     Loss of appetite     HISTORY OF PRESENT ILLNESS   HPI    80 yo M with hx of CAD s/p CABG, Neuropathy, HTN and recent dx of Diverticulitis who presented with worsening abdominal pain, nausea, vomiting and diarrhea. Unable to keep fluid down and had decreased urine reported by family. Zofran is not helping with his symptoms and family concerned that the Abx won't be effective as he throw up soon after he take it     Family reported that patient started to complain from lower abdominal pain last month for which he was given oral Abx for suspected Diverticulitis, however his symptoms was progressively getting worse and subsequently hospitalized and treated with IV abx and transitioned to oral Abx and discharged home last week with plan for colonoscopy to be done after 2 weeks and after patient completing the oral course of Abx. Of note, Pt has urological procedure done recently in Nov, Family  not sure about the details but reported it was for abscess drainage from bladder diverticulum. REVIEW OF SYSTEMS     Review of Systems   Constitutional:  Positive for activity change, appetite change and fatigue. Negative for chills, diaphoresis and fever. Respiratory:  Negative for shortness of breath. Cardiovascular:  Negative for chest pain and leg swelling. Gastrointestinal:  Positive for abdominal pain, diarrhea, nausea and vomiting. Negative for blood in stool and constipation. Genitourinary:  Positive for decreased urine volume. Neurological:  Positive for weakness (genralized weakness).    PASTMEDICAL HISTORY     Past Medical History:   Diagnosis Date    Bladder stone     CAD (coronary artery disease)     MI, Stents, CABG    Diverticulitis     GERD (gastroesophageal reflux disease) Hyperlipidemia     Hypertension     Intermittent self-catheterization of bladder     LBBB (left bundle branch block) 3/4/2017    MI (myocardial infarction) ([facility] [address]     Neuropathy     bilat toes    Polio     age 10    Wears glasses      Past Problem List  Patient Active Problem List   Diagnosis Code    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    LBBB (left bundle branch block) I44.7    S/P CABG (coronary artery bypass graft) Z95.1    Unstable angina (HCC) I20.0    S/P cardiac cath Z98.890    S/P ICD (internal cardiac defibrillator) procedure Z95.810     SURGICAL HISTORY       Past Surgical History:   Procedure Laterality Date    BLADDER STONE REMOVAL      CARDIAC SURGERY  2000    CABG    COLONOSCOPY      CORONARY ANGIOPLASTY WITH STENT PLACEMENT      4 stents    CORONARY ARTERY BYPASS GRAFT  2000    triple vessel    ENDOSCOPY, COLON, DIAGNOSTIC       CURRENT MEDICATIONS       Previous Medications    ACETAMINOPHEN (TYLENOL) 500 MG TABLET    Take 500 mg by mouth    AMITRIPTYLINE (ELAVIL) 25 MG TABLET    Take 25 mg by mouth nightly    CLOPIDOGREL (PLAVIX) 75 MG TABLET    Take 75 mg by mouth daily    CYANOCOBALAMIN 2500 MCG TABS    Take 5,000 mcg by mouth    DIPHENHYDRAMINE-APAP, SLEEP, (TYLENOL PM EXTRA STRENGTH PO)    Take 1 tablet by mouth as needed     DONEPEZIL (ARICEPT) 10 MG TABLET    Take 10 mg by mouth 2 times daily    FAMOTIDINE (PEPCID) 20 MG TABLET    famotidine 20 mg tablet   Take 1 tablet twice a day by oral route as needed. FINASTERIDE (PROSCAR) 5 MG TABLET    Take 5 mg by mouth daily    LACTOBACILLUS (ACIDOPHILUS) 100 MG CAPS    Take by mouth    MAGNESIUM 400 MG TABS    Take 400 mg by mouth    MELATONIN 10 MG CAPS    Take 10 mg by mouth    MUPIROCIN (BACTROBAN) 2 % OINTMENT    Apply 1 each topically 3 times daily Apply topically 3 times daily. NITROGLYCERIN (NITROSTAT) 0.4 MG SL TABLET    Place 0.4 mg under the tongue every 5 minutes as needed for Chest pain up to max of 3 total doses.  If no relief after 1 dose, call 911. ONDANSETRON (ZOFRAN) 4 MG TABLET    Take 4 mg by mouth every 8 hours as needed for Nausea or Vomiting    PREGABALIN (LYRICA) 100 MG CAPSULE    Take 100 mg by mouth 2 times daily. ROSUVASTATIN (CRESTOR) 20 MG TABLET    Take 20 mg by mouth daily    SUCRALFATE (CARAFATE) 1 GM TABLET    Take 1 g by mouth 4 times daily    TRAZODONE (DESYREL) 50 MG TABLET    Take 50 mg by mouth nightly    VALACYCLOVIR (VALTREX) 1 G TABLET    Take 1,000 mg by mouth as needed    VITAMIN D-3 (CHOLECALCIFEROL) 125 MCG (5000 UT) TABS    Take 5,000 Units by mouth     ALLERGIES     is allergic to codeine, morphine, pletal [cilostazol], sulfamethoxazole-trimethoprim, and pcn [penicillins]. FAMILY HISTORY     has no family status information on file. SOCIAL HISTORY       Social History     Tobacco Use    Smoking status: Never    Smokeless tobacco: Never   Vaping Use    Vaping Use: Never used   Substance Use Topics    Alcohol use: No    Drug use: No     PHYSICAL EXAM     INITIAL VITALS: BP (!) 120/59   Pulse 96   Temp 98.1 °F (36.7 °C)   Resp 18   Ht 5' 9\" (1.753 m)   Wt 145 lb (65.8 kg)   SpO2 94%   BMI 21.41 kg/m²    Physical Exam  Constitutional:       Appearance: He is ill-appearing. Comments: Lying in bed   HENT:      Mouth/Throat:      Mouth: Mucous membranes are dry. Abdominal:      General: Bowel sounds are normal. There is distension. Palpations: There is no mass. Tenderness: There is abdominal tenderness in the suprapubic area and left lower quadrant. There is rebound. There is no guarding. Hernia: No hernia is present. Musculoskeletal:      Right lower leg: No edema. Left lower leg: No edema. Skin:     General: Skin is dry. MEDICAL DECISION MAKING:                 ED Course as of 12/09/22 2042   Fri Dec 09, 2022   1725 Vitally stable  Pale, looks ill   Soft abdomen, no rigidity  Abdominal distension, normal bowel sounds   Tenderness of the lower abdomen and LLQ.   Dry skin and lips [YK]   1741 CBC, BMP, CT abdomen and pelvis with contrast.  Will give 1L fluid bolus and  IV Zofran [YK]   3614 Basic Metabolic Panel(!):    Glucose, Random 122(!)   BUN,BUNPL 12   Creatinine 1.01   Est, Glom Filt Rate >60   Bun/Cre Ratio 12   CALCIUM, SERUM, 888348 8.4(!)   Sodium 138   Potassium 3.9   Chloride 98   CO2 29   Anion Gap 11 [YK]   1831 CBC with Auto Differential(!):    WBC 10.3   RBC 4.47   Hemoglobin Quant 12.7(!)   Hematocrit 39.7(!)   MCV 88.8   MCH 28.4   MCHC 32.0   RDW 13.6   Platelet Count 764   MPV 10.7   NRBC Automated 0.0   Seg Neutrophils 78(!)   Lymphocytes 9(!)   Monocytes 12   Eosinophils % 0(!)   Basophils 0   Immature Granulocytes 1(!)   Segs Absolute 7.94   Absolute Lymph # 0.96(!)   Absolute Mono # 1.28(!)   Absolute Eos # <0.03   Basophils Absolute <0.03   Absolute Immature Granulocyte 0.07 [YK]   1954 CT ABDOMEN PELVIS W IV CONTRAST Additional Contrast? None  No diverticulitis on CT scan, moderate ascites  [YK]   2008 Decision to admit  [YK]      ED Course User Index  [YK] Remington Boles MD       CRITICAL CARE:       PROCEDURES:    Procedures    DIAGNOSTIC RESULTS   EKG:All EKG's are interpreted by the Emergency Department Physician who either signs or Co-signs this chart in the absence of a cardiologist.        RADIOLOGY:All plain film, CT, MRI, and formal ultrasound images (except ED bedside ultrasound) are read by the radiologist, see reports below, unless otherwisenoted in MDM or here. CT ABDOMEN PELVIS W IV CONTRAST Additional Contrast? None   Final Result   Moderate ascites with associated moderate diffuse edema within the mesentery. Severe diverticulosis involving the left colon without evidence of acute   diverticulitis. No abscess or perforation. Numerous bladder diverticula. Laminectomy at L4 and L5. Bilateral gynecomastia.          IR US GUIDED PARACENTESIS    (Results Pending)     LABS: All lab results were reviewed by myself, and all abnormals are listed below. Labs Reviewed   BASIC METABOLIC PANEL - Abnormal; Notable for the following components:       Result Value    Glucose 122 (*)     Calcium 8.4 (*)     All other components within normal limits   CBC WITH AUTO DIFFERENTIAL - Abnormal; Notable for the following components:    Hemoglobin 12.7 (*)     Hematocrit 39.7 (*)     Seg Neutrophils 78 (*)     Lymphocytes 9 (*)     Eosinophils % 0 (*)     Immature Granulocytes 1 (*)     Absolute Lymph # 0.96 (*)     Absolute Mono # 1.28 (*)     All other components within normal limits   RAPID INFLUENZA A/B ANTIGENS   COVID-19, RAPID   GRAM STAIN   CULTURE, BODY FLUID   LIPASE   PROTIME-INR   APTT   CELL COUNT WITH DIFFERENTIAL, BODY FLUID   PROTEIN, BODY FLUID   ALBUMIN, FLUID   GLUCOSE, BODY FLUID       EMERGENCY DEPARTMENTCOURSE:         Vitals:    Vitals:    12/09/22 1546   BP: (!) 120/59   Pulse: 96   Resp: 18   Temp: 98.1 °F (36.7 °C)   SpO2: 94%   Weight: 145 lb (65.8 kg)   Height: 5' 9\" (1.753 m)       The patient was given the following medications while in the emergency department:  Orders Placed This Encounter   Medications    0.9 % sodium chloride bolus    ondansetron (ZOFRAN) injection 4 mg    0.9 % sodium chloride bolus    iopamidol (ISOVUE-370) 76 % injection 75 mL    sodium chloride flush 0.9 % injection 10 mL    0.9 % sodium chloride infusion    sodium chloride flush 0.9 % injection 5-40 mL    sodium chloride flush 0.9 % injection 5-40 mL    0.9 % sodium chloride infusion     CONSULTS:  IP CONSULT TO INTERNAL MEDICINE    FINAL IMPRESSION      1. Lower abdominal pain    2. Other ascites    3. Failure to thrive in adult    4. Diverticulosis of colon          DISPOSITION/PLAN   DISPOSITION Decision To Admit 12/09/2022 08:15:42 PM      PATIENT REFERRED TO:  No follow-up provider specified.   DISCHARGE MEDICATIONS:  New Prescriptions    No medications on file     The care is provided during an unprecedented national emergency due to the novel coronavirus, COVID 19.   Jesslyn Peabody, MD Jesslyn Peabody, MD  Resident  12/09/22 5881

## 2022-12-10 ENCOUNTER — APPOINTMENT (OUTPATIENT)
Dept: GENERAL RADIOLOGY | Age: 82
DRG: 374 | End: 2022-12-10
Payer: MEDICARE

## 2022-12-10 PROBLEM — R63.0 ANOREXIA: Status: ACTIVE | Noted: 2022-12-10

## 2022-12-10 PROBLEM — R10.30 LOWER ABDOMINAL PAIN: Status: ACTIVE | Noted: 2022-12-10

## 2022-12-10 PROBLEM — R62.7 FAILURE TO THRIVE IN ADULT: Status: ACTIVE | Noted: 2022-12-10

## 2022-12-10 PROBLEM — R10.84 DIFFUSE ABDOMINAL PAIN: Status: ACTIVE | Noted: 2022-12-10

## 2022-12-10 LAB
ABSOLUTE EOS #: 0 K/UL (ref 0–0.44)
ABSOLUTE IMMATURE GRANULOCYTE: 0.1 K/UL (ref 0–0.3)
ABSOLUTE LYMPH #: 0.73 K/UL (ref 1.1–3.7)
ABSOLUTE MONO #: 1.25 K/UL (ref 0.1–1.2)
AFP: 4.6 UG/L
ALBUMIN SERPL-MCNC: 2.7 G/DL (ref 3.5–5.2)
ALP BLD-CCNC: 66 U/L (ref 40–129)
ALPHA-1 ANTITRYPSIN: 175 MG/DL (ref 90–200)
ALT SERPL-CCNC: 11 U/L (ref 5–41)
AMMONIA: 26 UMOL/L (ref 16–60)
ANION GAP SERPL CALCULATED.3IONS-SCNC: 11 MMOL/L (ref 9–17)
AST SERPL-CCNC: 20 U/L
BASOPHILS # BLD: 0 % (ref 0–2)
BASOPHILS ABSOLUTE: 0 K/UL (ref 0–0.2)
BILIRUB SERPL-MCNC: 0.3 MG/DL (ref 0.3–1.2)
BUN BLDV-MCNC: 10 MG/DL (ref 8–23)
BUN/CREAT BLD: 13 (ref 9–20)
CALCIUM SERPL-MCNC: 8.1 MG/DL (ref 8.6–10.4)
CERULOPLASMIN: 21 MG/DL (ref 15–30)
CHLORIDE BLD-SCNC: 102 MMOL/L (ref 98–107)
CO2: 25 MMOL/L (ref 20–31)
CREAT SERPL-MCNC: 0.75 MG/DL (ref 0.7–1.2)
EOSINOPHILS RELATIVE PERCENT: 0 % (ref 1–4)
FERRITIN: 97 NG/ML (ref 30–400)
GFR SERPL CREATININE-BSD FRML MDRD: >60 ML/MIN/1.73M2
GLUCOSE BLD-MCNC: 97 MG/DL (ref 70–99)
HAV IGM SER IA-ACNC: NONREACTIVE
HCT VFR BLD CALC: 38.8 % (ref 40.7–50.3)
HEMOGLOBIN: 11.7 G/DL (ref 13–17)
HEPATITIS B CORE IGM ANTIBODY: NONREACTIVE
HEPATITIS B SURFACE ANTIGEN: NONREACTIVE
HEPATITIS C ANTIBODY: NONREACTIVE
IMMATURE GRANULOCYTES: 1 %
INR BLD: 1.2
LACTIC ACID: 1.1 MMOL/L (ref 0.5–2.2)
LYMPHOCYTES # BLD: 7 % (ref 24–43)
MAGNESIUM: 1.9 MG/DL (ref 1.6–2.6)
MCH RBC QN AUTO: 28 PG (ref 25.2–33.5)
MCHC RBC AUTO-ENTMCNC: 30.2 G/DL (ref 28.4–34.8)
MCV RBC AUTO: 92.8 FL (ref 82.6–102.9)
MONOCYTES # BLD: 12 % (ref 3–12)
NRBC AUTOMATED: 0 PER 100 WBC
PDW BLD-RTO: 13.5 % (ref 11.8–14.4)
PLATELET # BLD: 298 K/UL (ref 138–453)
PMV BLD AUTO: 10.7 FL (ref 8.1–13.5)
POTASSIUM SERPL-SCNC: 3.3 MMOL/L (ref 3.7–5.3)
PROTHROMBIN TIME: 15.1 SEC (ref 11.5–14.2)
RBC # BLD: 4.18 M/UL (ref 4.21–5.77)
REASON FOR REJECTION: NORMAL
SEG NEUTROPHILS: 80 % (ref 36–65)
SEGMENTED NEUTROPHILS ABSOLUTE COUNT: 8.32 K/UL (ref 1.5–8.1)
SODIUM BLD-SCNC: 138 MMOL/L (ref 135–144)
TOTAL PROTEIN: 5.7 G/DL (ref 6.4–8.3)
TRANSFERRIN: 143 MG/DL (ref 200–360)
WBC # BLD: 10.4 K/UL (ref 3.5–11.3)
ZZ NTE CLEAN UP: ORDERED TEST: NORMAL
ZZ NTE WITH NAME CLEAN UP: SPECIMEN SOURCE: NORMAL

## 2022-12-10 PROCEDURE — 82103 ALPHA-1-ANTITRYPSIN TOTAL: CPT

## 2022-12-10 PROCEDURE — 2580000003 HC RX 258: Performed by: NURSE PRACTITIONER

## 2022-12-10 PROCEDURE — 99223 1ST HOSP IP/OBS HIGH 75: CPT | Performed by: INTERNAL MEDICINE

## 2022-12-10 PROCEDURE — 80074 ACUTE HEPATITIS PANEL: CPT

## 2022-12-10 PROCEDURE — 92611 MOTION FLUOROSCOPY/SWALLOW: CPT

## 2022-12-10 PROCEDURE — 83605 ASSAY OF LACTIC ACID: CPT

## 2022-12-10 PROCEDURE — 83516 IMMUNOASSAY NONANTIBODY: CPT

## 2022-12-10 PROCEDURE — 83735 ASSAY OF MAGNESIUM: CPT

## 2022-12-10 PROCEDURE — 86225 DNA ANTIBODY NATIVE: CPT

## 2022-12-10 PROCEDURE — 6360000002 HC RX W HCPCS: Performed by: FAMILY MEDICINE

## 2022-12-10 PROCEDURE — 97167 OT EVAL HIGH COMPLEX 60 MIN: CPT

## 2022-12-10 PROCEDURE — 86235 NUCLEAR ANTIGEN ANTIBODY: CPT

## 2022-12-10 PROCEDURE — 82728 ASSAY OF FERRITIN: CPT

## 2022-12-10 PROCEDURE — 97163 PT EVAL HIGH COMPLEX 45 MIN: CPT

## 2022-12-10 PROCEDURE — 2060000000 HC ICU INTERMEDIATE R&B

## 2022-12-10 PROCEDURE — 97530 THERAPEUTIC ACTIVITIES: CPT

## 2022-12-10 PROCEDURE — 6370000000 HC RX 637 (ALT 250 FOR IP): Performed by: FAMILY MEDICINE

## 2022-12-10 PROCEDURE — 2580000003 HC RX 258: Performed by: FAMILY MEDICINE

## 2022-12-10 PROCEDURE — 92526 ORAL FUNCTION THERAPY: CPT

## 2022-12-10 PROCEDURE — 86645 CMV ANTIBODY IGM: CPT

## 2022-12-10 PROCEDURE — 82105 ALPHA-FETOPROTEIN SERUM: CPT

## 2022-12-10 PROCEDURE — 86376 MICROSOMAL ANTIBODY EACH: CPT

## 2022-12-10 PROCEDURE — 86644 CMV ANTIBODY: CPT

## 2022-12-10 PROCEDURE — 86663 EPSTEIN-BARR ANTIBODY: CPT

## 2022-12-10 PROCEDURE — 74230 X-RAY XM SWLNG FUNCJ C+: CPT

## 2022-12-10 PROCEDURE — 36415 COLL VENOUS BLD VENIPUNCTURE: CPT

## 2022-12-10 PROCEDURE — 6360000002 HC RX W HCPCS: Performed by: NURSE PRACTITIONER

## 2022-12-10 PROCEDURE — 85025 COMPLETE CBC W/AUTO DIFF WBC: CPT

## 2022-12-10 PROCEDURE — 80053 COMPREHEN METABOLIC PANEL: CPT

## 2022-12-10 PROCEDURE — 84466 ASSAY OF TRANSFERRIN: CPT

## 2022-12-10 PROCEDURE — 82390 ASSAY OF CERULOPLASMIN: CPT

## 2022-12-10 PROCEDURE — 97116 GAIT TRAINING THERAPY: CPT

## 2022-12-10 PROCEDURE — 86664 EPSTEIN-BARR NUCLEAR ANTIGEN: CPT

## 2022-12-10 PROCEDURE — 86665 EPSTEIN-BARR CAPSID VCA: CPT

## 2022-12-10 PROCEDURE — 85610 PROTHROMBIN TIME: CPT

## 2022-12-10 PROCEDURE — 82140 ASSAY OF AMMONIA: CPT

## 2022-12-10 PROCEDURE — 86038 ANTINUCLEAR ANTIBODIES: CPT

## 2022-12-10 RX ORDER — ONDANSETRON 4 MG/1
4 TABLET, FILM COATED ORAL EVERY 8 HOURS PRN
Status: DISCONTINUED | OUTPATIENT
Start: 2022-12-10 | End: 2022-12-10 | Stop reason: SDUPTHER

## 2022-12-10 RX ORDER — SUCRALFATE 1 G/1
1 TABLET ORAL 4 TIMES DAILY
Status: DISCONTINUED | OUTPATIENT
Start: 2022-12-10 | End: 2022-12-20 | Stop reason: HOSPADM

## 2022-12-10 RX ORDER — FINASTERIDE 5 MG/1
5 TABLET, FILM COATED ORAL DAILY
Status: DISCONTINUED | OUTPATIENT
Start: 2022-12-11 | End: 2022-12-20 | Stop reason: HOSPADM

## 2022-12-10 RX ORDER — ENOXAPARIN SODIUM 100 MG/ML
40 INJECTION SUBCUTANEOUS DAILY
Status: DISCONTINUED | OUTPATIENT
Start: 2022-12-10 | End: 2022-12-20 | Stop reason: HOSPADM

## 2022-12-10 RX ORDER — DONEPEZIL HYDROCHLORIDE 10 MG/1
10 TABLET, FILM COATED ORAL 2 TIMES DAILY
Status: DISCONTINUED | OUTPATIENT
Start: 2022-12-10 | End: 2022-12-20 | Stop reason: HOSPADM

## 2022-12-10 RX ORDER — FAMOTIDINE 20 MG/1
20 TABLET, FILM COATED ORAL DAILY
Status: DISCONTINUED | OUTPATIENT
Start: 2022-12-10 | End: 2022-12-12

## 2022-12-10 RX ORDER — POTASSIUM CHLORIDE 7.45 MG/ML
10 INJECTION INTRAVENOUS PRN
Status: DISCONTINUED | OUTPATIENT
Start: 2022-12-10 | End: 2022-12-20 | Stop reason: HOSPADM

## 2022-12-10 RX ORDER — NITROGLYCERIN 0.4 MG/1
0.4 TABLET SUBLINGUAL EVERY 5 MIN PRN
Status: DISCONTINUED | OUTPATIENT
Start: 2022-12-10 | End: 2022-12-20 | Stop reason: HOSPADM

## 2022-12-10 RX ORDER — PREGABALIN 100 MG/1
100 CAPSULE ORAL 2 TIMES DAILY
Status: DISCONTINUED | OUTPATIENT
Start: 2022-12-10 | End: 2022-12-20 | Stop reason: HOSPADM

## 2022-12-10 RX ORDER — CLOPIDOGREL BISULFATE 75 MG/1
75 TABLET ORAL DAILY
Status: DISCONTINUED | OUTPATIENT
Start: 2022-12-10 | End: 2022-12-14

## 2022-12-10 RX ORDER — DEXTROSE AND SODIUM CHLORIDE 5; .45 G/100ML; G/100ML
INJECTION, SOLUTION INTRAVENOUS CONTINUOUS
Status: DISCONTINUED | OUTPATIENT
Start: 2022-12-10 | End: 2022-12-20 | Stop reason: HOSPADM

## 2022-12-10 RX ORDER — AMITRIPTYLINE HYDROCHLORIDE 25 MG/1
25 TABLET, FILM COATED ORAL NIGHTLY
Status: DISCONTINUED | OUTPATIENT
Start: 2022-12-10 | End: 2022-12-20 | Stop reason: HOSPADM

## 2022-12-10 RX ORDER — ROSUVASTATIN CALCIUM 10 MG/1
20 TABLET, COATED ORAL DAILY
Status: DISCONTINUED | OUTPATIENT
Start: 2022-12-10 | End: 2022-12-12

## 2022-12-10 RX ORDER — LANOLIN ALCOHOL/MO/W.PET/CERES
9 CREAM (GRAM) TOPICAL NIGHTLY
Status: DISCONTINUED | OUTPATIENT
Start: 2022-12-10 | End: 2022-12-20 | Stop reason: HOSPADM

## 2022-12-10 RX ORDER — LANOLIN ALCOHOL/MO/W.PET/CERES
400 CREAM (GRAM) TOPICAL DAILY
Status: DISCONTINUED | OUTPATIENT
Start: 2022-12-10 | End: 2022-12-20 | Stop reason: HOSPADM

## 2022-12-10 RX ADMIN — POTASSIUM CHLORIDE 10 MEQ: 7.46 INJECTION, SOLUTION INTRAVENOUS at 18:50

## 2022-12-10 RX ADMIN — DONEPEZIL HYDROCHLORIDE 10 MG: 10 TABLET, FILM COATED ORAL at 19:10

## 2022-12-10 RX ADMIN — ROSUVASTATIN CALCIUM 20 MG: 10 TABLET, FILM COATED ORAL at 19:54

## 2022-12-10 RX ADMIN — DEXTROSE AND SODIUM CHLORIDE: 5; 450 INJECTION, SOLUTION INTRAVENOUS at 10:49

## 2022-12-10 RX ADMIN — SUCRALFATE 1 G: 1 TABLET ORAL at 19:35

## 2022-12-10 RX ADMIN — POTASSIUM CHLORIDE 10 MEQ: 7.46 INJECTION, SOLUTION INTRAVENOUS at 12:40

## 2022-12-10 RX ADMIN — Medication 9 MG: at 19:10

## 2022-12-10 RX ADMIN — PREGABALIN 100 MG: 100 CAPSULE ORAL at 19:36

## 2022-12-10 RX ADMIN — POTASSIUM CHLORIDE 10 MEQ: 7.46 INJECTION, SOLUTION INTRAVENOUS at 16:41

## 2022-12-10 RX ADMIN — SODIUM CHLORIDE, PRESERVATIVE FREE 10 ML: 5 INJECTION INTRAVENOUS at 21:51

## 2022-12-10 RX ADMIN — AMITRIPTYLINE HYDROCHLORIDE 25 MG: 25 TABLET, FILM COATED ORAL at 19:10

## 2022-12-10 RX ADMIN — ENOXAPARIN SODIUM 40 MG: 100 INJECTION SUBCUTANEOUS at 19:54

## 2022-12-10 RX ADMIN — POTASSIUM CHLORIDE 10 MEQ: 7.46 INJECTION, SOLUTION INTRAVENOUS at 14:46

## 2022-12-10 RX ADMIN — FAMOTIDINE 20 MG: 20 TABLET, FILM COATED ORAL at 19:54

## 2022-12-10 ASSESSMENT — PAIN SCALES - GENERAL: PAINLEVEL_OUTOF10: 0

## 2022-12-10 NOTE — ACP (ADVANCE CARE PLANNING)
Advance Care Planning     Advance Care Planning Activator (Inpatient)  Conversation Note      Date of ACP Conversation: 12/10/2022     Conversation Conducted with:  Healthcare Decision Maker: Next of Kin by law (only applies in absence of above) (name) wife     ACP Activator: Donato Rossi RN        Health Care Decision Maker: self/wife     Current Designated Health Care Decision Maker: self/wife    Click here to complete Healthcare Decision Makers including section of the Healthcare Decision Maker Relationship (ie \"Primary\")  Today we documented Decision Maker(s). The patient will provide ACP documents. Care Preferences    Ventilation: \"If you were in your present state of health and suddenly became very ill and were unable to breathe on your own, what would your preference be about the use of a ventilator (breathing machine) if it were available to you? \"      Would the patient desire the use of ventilator (breathing machine)?: yes    \"If your health worsens and it becomes clear that your chance of recovery is unlikely, what would your preference be about the use of a ventilator (breathing machine) if it were available to you? \"     Would the patient desire the use of ventilator (breathing machine)?: No      Resuscitation  \"CPR works best to restart the heart when there is a sudden event, like a heart attack, in someone who is otherwise healthy. Unfortunately, CPR does not typically restart the heart for people who have serious health conditions or who are very sick. \"    \"In the event your heart stopped as a result of an underlying serious health condition, would you want attempts to be made to restart your heart (answer \"yes\" for attempt to resuscitate) or would you prefer a natural death (answer \"no\" for do not attempt to resuscitate)? \" no       [] Yes   [] No   Educated Patient / Hebert Lincoln regarding differences between Advance Directives and portable DNR orders.     Length of ACP Conversation in minutes:  10    Conversation Outcomes:  [x] ACP discussion completed  [] Existing advance directive reviewed with patient; no changes to patient's previously recorded wishes  [] New Advance Directive completed  [] Portable Do Not Rescitate prepared for Provider review and signature  [] POLST/POST/MOLST/MOST prepared for Provider review and signature      Follow-up plan:    [] Schedule follow-up conversation to continue planning  [x] Referred individual to Provider for additional questions/concerns   [] Advised patient/agent/surrogate to review completed ACP document and update if needed with changes in condition, patient preferences or care setting    [] This note routed to one or more involved healthcare providers

## 2022-12-10 NOTE — PROGRESS NOTES
Pt arrived to unit, Vitals taken, Telemonitor placed, Pt is calm and cooperative, all questions asked and answered. Bed in lowest position, bed locked, call light within reach and explained.

## 2022-12-10 NOTE — PROGRESS NOTES
Speech Language Pathology  Speech Language Pathology  [facility]    Dysphagia Treatment Note    Date: 12/10/2022  Patients Name: Leopoldo Rhody  MRN: [id number]  Diagnosis:   Patient Active Problem List   Diagnosis Code    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    LBBB (left bundle branch block) I44.7    S/P CABG (coronary artery bypass graft) Z95.1    Unstable angina (HCC) I20.0    S/P cardiac cath Z98.890    S/P ICD (internal cardiac defibrillator) procedure Z95.810    Other ascites R18.8    Lower abdominal pain R10.30    Diffuse abdominal pain R10.84    Failure to thrive in adult R62.7    Anorexia R63.0       Pain: pt denies    Dysphagia Treatment  Treatment time: 2186-5703    Subjective: [x] Alert [x] Cooperative     [] Confused     [] Agitated    [] Lethargic    Objective/Assessment:    Pt. Seen for follow up for education/training post Modified Barium Swallow Study. Discussed results with pt and family- Easy to [facility] with thin liquids, small sips/bites, upright at 90 degrees, double swallow. Discussed signs/risks of aspiration. Pt and family verbalized understanding. Pt observed with trials thin liquid, took large sips despite cues, double swallowed with min verbal cues. No overt s/s of aspiration. Plan:  [x] Continue ST services    [] Discharge from ST:        Discharge recommendations: []  Further therapy recommended at discharge. The patient should be able to tolerate at least 3 hours of therapy per day over 5 days or 15 hours over 7 days. [x] Further therapy recommended at discharge (pending diet tolerance). [] No therapy recommended at discharge.          Treatment completed by: Jeremy Celis, SLP, M.S. [id number] [facility]

## 2022-12-10 NOTE — ED NOTES
ED to inpatient nurses report     Chief Complaint   Patient presents with    Abdominal Pain     Loss of appetite      Present to ED from home  LOC: alert and oriented x 4   Vital signs   Vitals:    12/09/22 1546 12/09/22 2216   BP: (!) 120/59 (!) 114/59   Pulse: 96 88   Resp: 18 16   Temp: 98.1 °F (36.7 °C) 98.4 °F (36.9 °C)   SpO2: 94% 93%   Weight: 145 lb (65.8 kg)    Height: 5' 9\" (1.753 m)       Oxygen Baseline room air    Current needs required see admission orders   SEPSIS: no  no] Lactate X 2 ordered (Yes or No)  [no Antibiotics given (Yes or No)  [yes IV Fluids ordered (Yes or No)             yes] IV completed (Yes or No)  no] Hourly Vital Signs (Validated)*] Outstanding Orders: no    LDAs:   Peripheral IV 12/09/22 Right Forearm (Active)   Site Assessment Clean, dry & intact 12/09/22 1745   Line Status Blood return noted 12/09/22 1745   Phlebitis Assessment No symptoms 12/09/22 1745   Infiltration Assessment 0 12/09/22 1745   Alcohol Cap Used Yes 12/09/22 1745   Dressing Status New dressing applied 12/09/22 1745   Dressing Type Transparent 12/09/22 1745   Dressing Intervention New 12/09/22 1745     Mobility: up with assist  Fall Risk: yes  Pending ED orders: see admission orders  Present condition: stable  Code Status: full  Consults: IP CONSULT TO INTERNAL MEDICINE  []  Hospitalist  Completed  [x] yes [] no Who:   []  Medicine  Completed  [] yes [] No Who:   []  Cardiology  Completed  [] yes [] No Who:   []  GI   Completed  [] yes [] No Who:   []  Neurology  Completed  [] yes [] No Who:   []  Nephrology Completed  [] yes [] No Who:    []  Vascular  Completed  [] yes [] No Who:   []  Ortho  Completed  [] yes [] No Who:     []  Surgery  Completed  [] yes [] No Who:    []  Urology  Completed  [] yes [] No Who:    []  CT Surgery Completed  [] yes [] No Who:   []  Podiatry  Completed  [] yes [] No Who:    []  Other    Completed  [] yes [] No Who:  Interventions:iv fluids zofran*  Important Events: pt presents to the er c/o abdominal pain pt has moderate ascites with moderate diffuse edema in the mesentary area of his abdomen pt have fluid removed by interventional radiology tomorrow        Electronically signed by Freddie Salazar RN on 12/9/2022 at 11:13 PM     Richard Norman RN  12/09/22 9463

## 2022-12-10 NOTE — PROGRESS NOTES
Spoke to Dr Neeru Cabrales regarding paracentesis. Informed him that since it is the weekend IR would have to be called in for the paracentesis - provided him with the on call IR number. Also ordered a swallow study d/t pt wife reporting him choking and coughing when drinking.  Pt remaining NPO and will have a swallow study at [facility].

## 2022-12-10 NOTE — CONSULTS
GI Consult Note:    Name: Lisa Selby  MRN: [id number]     [place]: [de-identified]  Room: [address] [place] [address]-    Admit Date: 12/9/2022  PCP: Leandra Lou DO    Physician Requesting Consult: Cristy Fontaine MD     Reason for Consult:    Abdominal pain  Ascites  History of cirrhosis?   Dysphagia  Malnutrition  Weakness and fatigue  History of diverticulitis    Chief Complaint:     Chief Complaint   Patient presents with    Abdominal Pain     Loss of appetite       History Obtained From:     Patient patient's wife at bedside and EMR    History of Present Illness:      Lisa Selby is a  81 y.o.  male who presents with Abdominal Pain (Loss of appetite)    This elderly gentleman was evaluated with his wife at bedside and EMR was reviewed  It appears that patient has been hospitalized in the past with history for some diverticulitis as well as urology issues had cystoscopy done in the past    He has been seen and followed by gastroenterologist at [facility]  Suspected to have some liver problems    He says that since last admission he has not been feeling well has been feeling weak tired fatigue has lost his appetite not able to eat much    He is also found to have some ascites  Also is anemic  He denies any overt bleeding melanotic stools  He has persistent abdominal pain  He has been losing weight  He denies any fever  Has nausea but no vomiting  He also had some diarrhea which seems to be improving now without any rectal bleeding melanotic stools    He has been planned to have GI work-up including EGD and colonoscopy done as an outpatient    Symptoms:  Onset:  Location:  abdomen  Duration:  day(s)  Severity:  moderate  Quality:  intermittent      Past Medical History:     Past Medical History:   Diagnosis Date    Bladder stone     CAD (coronary artery disease)     MI, Stents, CABG    Diverticulitis     GERD (gastroesophageal reflux disease)     Hyperlipidemia     Hypertension     Intermittent self-catheterization of bladder     LBBB (left bundle branch block) 3/4/2017    MI (myocardial infarction) ([facility] [address]     Neuropathy     bilat toes    Polio     age 10    Wears glasses         Past Surgical History:     Past Surgical History:   Procedure Laterality Date    BLADDER STONE REMOVAL      CARDIAC SURGERY  2000    CABG    COLONOSCOPY      CORONARY ANGIOPLASTY WITH STENT PLACEMENT      4 stents    CORONARY ARTERY BYPASS GRAFT  2000    triple vessel    ENDOSCOPY, COLON, DIAGNOSTIC          Medications Prior to Admission:       Prior to Admission medications    Medication Sig Start Date End Date Taking? Authorizing Provider   pregabalin (LYRICA) 100 MG capsule Take 100 mg by mouth 2 times daily. Historical Provider, MD   finasteride (PROSCAR) 5 MG tablet Take 5 mg by mouth daily    Historical Provider, MD   donepezil (ARICEPT) 10 MG tablet Take 10 mg by mouth 2 times daily    Historical Provider, MD   amitriptyline (ELAVIL) 25 MG tablet Take 25 mg by mouth nightly    Historical Provider, MD   rosuvastatin (CRESTOR) 20 MG tablet Take 20 mg by mouth daily    Historical Provider, MD   traZODone (DESYREL) 50 MG tablet Take 50 mg by mouth nightly  Patient not taking: Reported on 12/10/2022    Historical Provider, MD   sucralfate (CARAFATE) 1 GM tablet Take 1 g by mouth 4 times daily    Historical Provider, MD   ondansetron (ZOFRAN) 4 MG tablet Take 4 mg by mouth every 8 hours as needed for Nausea or Vomiting    Historical Provider, MD   valACYclovir (VALTREX) 1 g tablet Take 1,000 mg by mouth as needed  Patient not taking: Reported on 12/10/2022 6/16/18   Historical Provider, MD   Melatonin 10 MG CAPS Take 10 mg by mouth    Historical Provider, MD   famotidine (PEPCID) 20 MG tablet famotidine 20 mg tablet   Take 1 tablet twice a day by oral route as needed.     Historical Provider, MD   acetaminophen (TYLENOL) 500 MG tablet Take 500 mg by mouth prn    Historical Provider, MD   Magnesium 400 MG TABS Take 400 mg by mouth    Historical Provider, MD   Lactobacillus (ACIDOPHILUS) 100 MG CAPS Take by mouth  Patient not taking: Reported on 12/10/2022    Historical Provider, MD   vitamin D-3 (CHOLECALCIFEROL) 125 MCG (5000 UT) TABS Take 5,000 Units by mouth    Historical Provider, MD   Cyanocobalamin 2500 MCG TABS Take 5,000 mcg by mouth    Historical Provider, MD   nitroGLYCERIN (NITROSTAT) 0.4 MG SL tablet Place 0.4 mg under the tongue every 5 minutes as needed for Chest pain up to max of 3 total doses. If no relief after 1 dose, call 911. Using as needed but has not needed them    Historical Provider, MD   clopidogrel (PLAVIX) 75 MG tablet Take 75 mg by mouth daily    Historical Provider, MD   Diphenhydramine-APAP, sleep, (TYLENOL PM EXTRA STRENGTH PO) Take 1 tablet by mouth as needed     Historical Provider, MD   mupirocin (BACTROBAN) 2 % ointment Apply 1 each topically 3 times daily Apply topically 3 times daily. Patient not taking: Reported on 12/10/2022    Historical Provider, MD        Allergies:       Ciprofloxacin, Codeine, Morphine, Pletal [cilostazol], Sulfamethoxazole-trimethoprim, and Pcn [penicillins]    Social History:     Tobacco:    reports that he has never smoked. He has never used smokeless tobacco.  Alcohol:      reports no history of alcohol use. Drug Use:  reports no history of drug use. Family History:     No family history on file.     Review of Systems:     Positive and Negative as described in HPI    Constitutional:  negative for  fevers, chills, sweats, positive fatigue, and weight loss  HEENT:  negative for vision or hearing changes,   Respiratory:  negative for shortness of breath, cough, or congestion  Cardiovascular:  negative for  chest pain, palpitations  Gastrointestinal: Positive nausea, vomiting, diarrhea, constipation, abdominal pain  Genitourinary: Positive frequency, dysuria  Integument: Positive rash, skin lesions  Musculoskeletal: Positive for muscle aches or joint pain  Neurological: negative for headaches, dizziness, lightheadedness, numbness, pain and tingling extrimities  Behavior/Psych:  negative for depression and positive anxiety    Code Status:  Full Code    Physical Exam:     Vitals:  /74   Pulse 61   Temp 98.6 °F (37 °C) (Oral)   Resp 20   Ht 5' 9\" (1.753 m)   Wt 145 lb (65.8 kg)   SpO2 92%   BMI 21.41 kg/m²   Temp (24hrs), Av.3 °F (36.8 °C), Min:98.1 °F (36.7 °C), Max:98.6 °F (37 °C)      General appearance - alert, elderly sick appearing, and in no acute distress  Mental status - oriented to person, place, and time with anxious affect  Head - normocephalic and atraumatic  Eyes - pupils equal and reactive, extraocular eye movements intact, conjunctiva clear  Ears - hearing appears to be intact  Nose - no drainage noted  Mouth - mucous membranes dry  Neck - supple, no carotid bruits, thyroid not palpable  Chest -reveals bilateral to auscultation, normal effort  Heart - normal rate, regular rhythm, no murmurs  Abdomen - soft, bloated diffusely positive for tenderness, distended, bowel sounds present all four quadrants, no masses, hepatomegaly or splenomegaly.  No hernias  Neurological -very slow but normal speech, no focal findings or movement disorder noted, cranial nerves not checked at this time  Extremities -mild pedal edema or calf pain with palpation  Skin nduration noted  Cranial Nerves : Not done at this time  Lymph nodes: not done at this time    Data:   CBC:   Lab Results   Component Value Date/Time    WBC 10.4 12/10/2022 06:10 AM    RBC 4.18 12/10/2022 06:10 AM    HGB 11.7 12/10/2022 06:10 AM    HCT 38.8 12/10/2022 06:10 AM    MCV 92.8 12/10/2022 06:10 AM    MCH 28.0 12/10/2022 06:10 AM    MCHC 30.2 12/10/2022 06:10 AM    RDW 13.5 12/10/2022 06:10 AM     12/10/2022 06:10 AM    MPV 10.7 12/10/2022 06:10 AM     CBC with Differential:    Lab Results   Component Value Date/Time    WBC 10.4 12/10/2022 06:10 AM    RBC 4.18 12/10/2022 06:10 AM    HGB 11.7 12/10/2022 06:10 AM    HCT 38.8 12/10/2022 06:10 AM     12/10/2022 06:10 AM    MCV 92.8 12/10/2022 06:10 AM    MCH 28.0 12/10/2022 06:10 AM    MCHC 30.2 12/10/2022 06:10 AM    RDW 13.5 12/10/2022 06:10 AM    LYMPHOPCT 7 12/10/2022 06:10 AM    MONOPCT 12 12/10/2022 06:10 AM    BASOPCT 0 12/10/2022 06:10 AM    MONOSABS 1.25 12/10/2022 06:10 AM    LYMPHSABS 0.73 12/10/2022 06:10 AM    EOSABS 0.00 12/10/2022 06:10 AM    BASOSABS 0.00 12/10/2022 06:10 AM    DIFFTYPE NOT REPORTED 03/03/2017 10:59 PM     Hemoglobin/Hematocrit:    Lab Results   Component Value Date/Time    HGB 11.7 12/10/2022 06:10 AM    HCT 38.8 12/10/2022 06:10 AM     CMP:    Lab Results   Component Value Date/Time     12/10/2022 06:10 AM    K 3.3 12/10/2022 06:10 AM     12/10/2022 06:10 AM    CO2 25 12/10/2022 06:10 AM    BUN 10 12/10/2022 06:10 AM    CREATININE 0.75 12/10/2022 06:10 AM    GFRAA >60 10/12/2021 07:50 AM    LABGLOM >60 12/10/2022 06:10 AM    GLUCOSE 97 12/10/2022 06:10 AM    PROT 5.7 12/10/2022 06:10 AM    LABALBU 2.7 12/10/2022 06:10 AM    CALCIUM 8.1 12/10/2022 06:10 AM    BILITOT 0.3 12/10/2022 06:10 AM    ALKPHOS 66 12/10/2022 06:10 AM    AST 20 12/10/2022 06:10 AM    ALT 11 12/10/2022 06:10 AM     BMP:    Lab Results   Component Value Date/Time     12/10/2022 06:10 AM    K 3.3 12/10/2022 06:10 AM     12/10/2022 06:10 AM    CO2 25 12/10/2022 06:10 AM    BUN 10 12/10/2022 06:10 AM    LABALBU 2.7 12/10/2022 06:10 AM    CREATININE 0.75 12/10/2022 06:10 AM    CALCIUM 8.1 12/10/2022 06:10 AM    GFRAA >60 10/12/2021 07:50 AM    LABGLOM >60 12/10/2022 06:10 AM    GLUCOSE 97 12/10/2022 06:10 AM     PT/INR:    Lab Results   Component Value Date/Time    PROTIME 15.1 12/10/2022 05:23 AM    INR 1.2 12/10/2022 05:23 AM     PTT:    Lab Results   Component Value Date/Time    APTT 30.3 12/09/2022 09:10 PM   [APTT}    Assesment:     Primary Problem  Ascites of liver    Active Hospital Problems    Diagnosis Date Noted Ascites of liver [R18.8] 12/09/2022     Priority: Medium     Abdominal pain  Ascites  History of cirrhosis? Dysphagia  Malnutrition  Weakness and fatigue  History of diverticulitis  Plan:     I would recommend doing paracentesis with analysis rule out SBP  Also do cytology on the ascitic fluid  Follow-up hemoglobin hematocrit  Check stool for occult blood x3  PPI  Swallow study scheduled for upper GI x-rays this afternoon  Liver disease work-up  We will reevaluate in the morning  Explained to the patient and his wife and their questions were answered  Discussed with nursing staff on the floor        Thank you for allowing me to participate in the care of your patient. Please feel free to contact me with any questions or concerns.      Electronically signed by Magdiel Meehan MD on 12/10/2022 at 11:24 AM     Copy sent to Dr. Pravin Houser DO

## 2022-12-10 NOTE — CARE COORDINATION
12/10/22 1159   Service Assessment   Patient Orientation Person;Place; Other (see comment)  (spoke with wife Maricel Galvez)   Cognition Alert   History Provided By [facility]   Primary Caregiver Spouse   Accompanied By/Relationship spouse Dana Woodson Spouse/Significant Other;Family Members   PCP Verified by [employer] Yes  (November 2022)   Last Visit to PCP Within last 3 months   Current Functional Level Assistance with the following:;Toileting;Cooking;Housework; Shopping;Mobility; Bathing   Can patient return to prior living arrangement Unknown at present   Ability to make needs known: Fair   Family able to assist with home care needs: Yes   Financial Resources Medicare  (Medicare/BCBS)   Social/Functional History   Lives With Spouse;Daughter   Type of [address] Two level; Able to Live on Main level with bedroom/bathroom   Home Access Stairs to enter with rails   Entrance Stairs - Number of Steps 5   Bathroom Shower/Tub Tub/Shower unit   Bathroom Equipment Grab bars around toilet; Toilet raiser;Tub transfer [place], standard; [address] Help From Family   ADL Assistance Needs assistance   Bath Stand by assistance   Toileting Needs assistance   Ambulation Assistance Needs assistance   Transfer Assistance Needs assistance   Active  No   Patient's  Info wife   Occupation Retired   Discharge Planning   Type of [facility] Spouse/Significant Other;Children   Current Services Prior To Admission [facility]   Current DME Prior to [place]; Shower Chair   Potential Assistance Needed Skilled Nursing Facility;Home Care   DME Ordered? No   Type of Home Care Services None   Patient expects to be discharged to: House   One/Two Story Residence Two story   History of falls? 1   Services At/After Discharge   Transition of Care Consult (CM Consult) SNF; [facility] Discharge Home Health;Skilled Nursing Facility (SNF)   Confirm Follow Up Transport Family   Condition of Participation: Discharge Planning   The Patient and/or Patient Representative was provided with a Choice of Provider? Patient   Freedom of Choice list was provided with basic dialogue that supports the patient's individualized plan of care/goals, treatment preferences, and shares the quality data associated with the providers? Yes       Pt is a+o person , place, can give address, birthdate. Spoke with wife Bernadine Stephens at bedside. Relays he does have a hx Alzheimers. DME-walker, wc, shower bench, gait belt. Pt lives with wife and dtr (dtr does have beginning stages of Alzheimers). Pt has been increasingly weak the past 6 weeks has sever neuropathy, tremors. Was initially using walker but now pretty much wc bound for most activities-2 assist to get out of home and down steps. Wife assists with ADLs and drives. RX [address]. Discussed hhc vs snf-she is concerned if he goes to snf he may have increased confusion being out of his own environment with his hx Alzheimers-he has never been to snf. Discussed with LSW-will f/u on choices after PT/OT. She will still consider pending PT OT recs-if so would like somewhere near their home. Agrees to at least hhc.-Ref sent to [facility].

## 2022-12-10 NOTE — CARE COORDINATION
Social work: spoke to [employer] they may be able to help with referral however they do no accept new referrals on weekends. Will need ale, Rx and discharge order for snf.  Jessica Meraz lsw

## 2022-12-10 NOTE — PLAN OF CARE
Pt arrived to the unit from ED tonight. Pt comfortable, pleasantly confused. Bed alarm on. Pt is easily reoriented. Pt is scheduled to have a paracentesis sometime today. Bed locked, lowest position, and call light within reach. Will continue to monitor. Problem: Discharge Planning  Goal: Discharge to home or other facility with appropriate resources  Outcome: Progressing     Problem: Skin/Tissue Integrity  Goal: Absence of new skin breakdown  Description: 1. Monitor for areas of redness and/or skin breakdown  2. Assess vascular access sites hourly  3. Every 4-6 hours minimum:  Change oxygen saturation probe site  4. Every 4-6 hours:  If on nasal continuous positive airway pressure, respiratory therapy assess nares and determine need for appliance change or resting period.   Outcome: Progressing     Problem: Safety - Adult  Goal: Free from fall injury  Outcome: Progressing     Problem: ABCDS Injury Assessment  Goal: Absence of physical injury  Outcome: Progressing

## 2022-12-10 NOTE — H&P
History & Physical  [facility].,    Adult Hospitalist      Name: Leopoldo Rhody  MRN: [id number]     Acct: [de-identified]  Room: [facility]7/1017-02    Admit Date: 12/9/2022  5:11 PM  PCP: Jeffy Porter DO    Primary Problem  Principal Problem:    Other ascites  Active Problems:    Lower abdominal pain    Diffuse abdominal pain    Failure to thrive in adult    Anorexia  Resolved Problems:    * No resolved hospital problems. 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 Zev Vang  CBC, BMP  Discussed with wife at bedside and granddaughter on phone  DVT and GI prophylaxis. Chief Complaint:     Chief Complaint   Patient presents with    Abdominal Pain     Loss of appetite         History of Present Illness:      Leopoldo Rhody is a 81 y.o.  male who presents with Abdominal Pain (Loss of appetite)    Patient admitted to the emergency room where he presented with progressive concerns with anorexia and weight loss. Patient has been found to have ascites. He has been complaining of progressive abdominal pain for last several weeks. Seen by his GI who referred him to the emergency room we will undergo a diagnostic and therapeutic paracentesis. Have consulted interventional radiology department has a protocol of not performing paracentesis over the weekend at [facility].   As the case with the radiologist on-call they feel the patient is not in any respiratory distress so it is not urgent to perform the test they will try to get it done earlier than Monday. Also had an episode of acute diverticulitis was 2 months ago. He  has continued intermittent abdominal pain since then. He got evaluated by GI who determined it was a worsening ascites causing the majority of his symptoms. He has been uncomfortable at times and has been worse when he is laying on his back. Also has history of dementia and has to be redirected. His wife is at bedside and has provided most of the history    Denies any chest pain, dyspnea. Denies headache, fever, chills, photophobia or neck pain. Denies vomiting, rash or joint swelling. Patient has diarrhea for several days. He usually have this 1-2 loose bowel movements daily but denies having any blood, mucus or pus    I have personally reviewed the past medical history, past surgical history, medications, social history, and family history, and summarized in the note. Review of Systems:     All 10 point system is reviewed and negative otherwise mentioned in HPI.       Past Medical History:     Past Medical History:   Diagnosis Date    Bladder stone     CAD (coronary artery disease)     MI, Stents, CABG    Diverticulitis     GERD (gastroesophageal reflux disease)     Hyperlipidemia     Hypertension     Intermittent self-catheterization of bladder     LBBB (left bundle branch block) 3/4/2017    MI (myocardial infarction) (HCC)     Neuropathy     bilat toes    Polio     age 10    Wears glasses         Past Surgical History:     Past Surgical History:   Procedure Laterality Date    BLADDER STONE REMOVAL      CARDIAC SURGERY  2000    CABG    COLONOSCOPY      CORONARY ANGIOPLASTY WITH STENT PLACEMENT      4 stents    CORONARY ARTERY BYPASS GRAFT  2000    triple vessel    ENDOSCOPY, COLON, DIAGNOSTIC          Medications Prior to Admission:       Prior to Admission medications Medication Sig Start Date End Date Taking? Authorizing Provider   pregabalin (LYRICA) 100 MG capsule Take 100 mg by mouth 2 times daily. Historical Provider, MD   finasteride (PROSCAR) 5 MG tablet Take 5 mg by mouth daily    Historical Provider, MD   donepezil (ARICEPT) 10 MG tablet Take 10 mg by mouth 2 times daily    Historical Provider, MD   amitriptyline (ELAVIL) 25 MG tablet Take 25 mg by mouth nightly    Historical Provider, MD   rosuvastatin (CRESTOR) 20 MG tablet Take 20 mg by mouth daily    Historical Provider, MD   traZODone (DESYREL) 50 MG tablet Take 50 mg by mouth nightly  Patient not taking: Reported on 12/10/2022    Historical Provider, MD   sucralfate (CARAFATE) 1 GM tablet Take 1 g by mouth 4 times daily    Historical Provider, MD   ondansetron (ZOFRAN) 4 MG tablet Take 4 mg by mouth every 8 hours as needed for Nausea or Vomiting    Historical Provider, MD   valACYclovir (VALTREX) 1 g tablet Take 1,000 mg by mouth as needed  Patient not taking: Reported on 12/10/2022 6/16/18   Historical Provider, MD   Melatonin 10 MG CAPS Take 10 mg by mouth    Historical Provider, MD   famotidine (PEPCID) 20 MG tablet famotidine 20 mg tablet   Take 1 tablet twice a day by oral route as needed. Historical Provider, MD   acetaminophen (TYLENOL) 500 MG tablet Take 500 mg by mouth prn    Historical Provider, MD   Magnesium 400 MG TABS Take 400 mg by mouth    Historical Provider, MD   Lactobacillus (ACIDOPHILUS) 100 MG CAPS Take by mouth  Patient not taking: Reported on 12/10/2022    Historical Provider, MD   vitamin D-3 (CHOLECALCIFEROL) 125 MCG (5000 UT) TABS Take 5,000 Units by mouth    Historical Provider, MD   Cyanocobalamin 2500 MCG TABS Take 5,000 mcg by mouth    Historical Provider, MD   nitroGLYCERIN (NITROSTAT) 0.4 MG SL tablet Place 0.4 mg under the tongue every 5 minutes as needed for Chest pain up to max of 3 total doses. If no relief after 1 dose, call 911.  Using as needed but has not needed them Historical Provider, MD   clopidogrel (PLAVIX) 75 MG tablet Take 75 mg by mouth daily    Historical Provider, MD   Diphenhydramine-APAP, sleep, (TYLENOL PM EXTRA STRENGTH PO) Take 1 tablet by mouth as needed     Historical Provider, MD   mupirocin (BACTROBAN) 2 % ointment Apply 1 each topically 3 times daily Apply topically 3 times daily. Patient not taking: Reported on 12/10/2022    Historical Provider, MD        Allergies:       Ciprofloxacin, Codeine, Morphine, Pletal [cilostazol], Sulfamethoxazole-trimethoprim, and Pcn [penicillins]    Social History:     Tobacco:    reports that he has never smoked. He has never used smokeless tobacco.  Alcohol:      reports no history of alcohol use. Drug Use:  reports no history of drug use. Family History:     No family history on file.       Physical Exam:     Vitals:  /83   Pulse 84   Temp 98.4 °F (36.9 °C)   Resp 18   Ht 5' 9\" (1.753 m)   Wt 145 lb (65.8 kg)   SpO2 92%   BMI 21.41 kg/m²   Temp (24hrs), Av.3 °F (36.8 °C), Min:97.5 °F (36.4 °C), Max:98.6 °F (37 °C)      General appearance - alert, well appearing, and in no acute distress  Mental status - not oriented to person, place, and time with normal affect  Head - normocephalic and atraumatic  Eyes - pupils equal and reactive, extraocular eye movements intact, conjunctiva clear  Ears - hearing appears to be intact  Nose - no drainage noted  Mouth - mucous membranes moist  Neck - supple, no carotid bruits, thyroid not palpable  Chest - clear to auscultation, normal effort  Heart - normal rate, regular rhythm, no murmur  Abdomen - soft, tender, distended, bowel sounds present all four quadrants, no masses, hepatomegaly or splenomegaly  Neurological - normal speech, no focal findings or movement disorder noted, cranial nerves II through XII grossly intact  Extremities - peripheral pulses palpable, no pedal edema or calf pain with palpation  Skin - no gross lesions, rashes, or induration noted        Data:     Labs:    Hematology:  Recent Labs     12/09/22  1750 12/09/22  2110 12/10/22  0523 12/10/22  0610   WBC 10.3  --   --  10.4   RBC 4.47  --   --  4.18*   HGB 12.7*  --   --  11.7*   HCT 39.7*  --   --  38.8*   MCV 88.8  --   --  92.8   MCH 28.4  --   --  28.0   MCHC 32.0  --   --  30.2   RDW 13.6  --   --  13.5     --   --  298   MPV 10.7  --   --  10.7   INR  --  1.2 1.2  --      Chemistry:  Recent Labs     12/09/22  1750 12/10/22  0610    138   K 3.9 3.3*   CL 98 102   CO2 29 25   GLUCOSE 122* 97   BUN 12 10   CREATININE 1.01 0.75   MG  --  1.9   ANIONGAP 11 11   LABGLOM >60 >60   CALCIUM 8.4* 8.1*     Recent Labs     12/09/22  2110 12/10/22  0610 12/10/22  1020   PROT  --  5.7*  --    LABALBU  --  2.7*  --    AST  --  20  --    ALT  --  11  --    ALKPHOS  --  66  --    BILITOT  --  0.3  --    AMMONIA  --   --  26   LIPASE 29  --   --        Lab Results   Component Value Date    INR 1.2 12/10/2022    INR 1.2 12/09/2022    INR >13.9 (HH) 03/03/2017    PROTIME 15.1 (H) 12/10/2022    PROTIME 15.2 (H) 12/09/2022    PROTIME >150.0 (H) 03/03/2017       Lab Results   Component Value Date/Time    SPECIAL NOT REPORTED 02/18/2020 10:58 PM     Lab Results   Component Value Date/Time    CULTURE ENTEROCOCCUS FAECALIS >988114 CFU/ML (A) 02/18/2020 10:58 PM       No results found for: POCPH, PHART, PH, POCPCO2, RKN2IOF, PCO2, POCPO2, PO2ART, PO2, POCHCO3, UDB0RXN, HCO3, NBEA, PBEA, BEART, BE, THGBART, THB, KIA3HCK, JNST5TII, A7FEZQJE, O2SAT, FIO2    Radiology:    CT ABDOMEN PELVIS W IV CONTRAST Additional Contrast? None    Result Date: 12/9/2022  Moderate ascites with associated moderate diffuse edema within the mesentery. Severe diverticulosis involving the left colon without evidence of acute diverticulitis. No abscess or perforation. Numerous bladder diverticula. Laminectomy at L4 and L5. Bilateral gynecomastia.      FL MODIFIED BARIUM SWALLOW W VIDEO    Result Date: 12/10/2022  1. 1 instance of flash penetration without aspiration with the pureed/pudding thick substance. 2. No penetration or aspiration with the remainder of the administered substances/administrations. Please see separate speech pathology report for full discussion of findings and recommendations. All radiological studies reviewed    Code Status:  Full Code    Electronically signed by Becky Wilcox MD on 12/10/2022 at 5:04 PM     Copy sent to Dr. Pravin Houser DO    This note was created with the assistance of a speech-recognition program.  Although the intention is to generate a document that actually reflects the content of the visit, no guarantees can be provided that every mistake has been identified and corrected by editing. Note was updated later by me after  physical examination and  completion of the assessment.

## 2022-12-10 NOTE — PROCEDURES
INSTRUMENTAL SWALLOW REPORT  MODIFIED BARIUM SWALLOW    NAME: Chris Morales   : 1940  MRN: [id number]       Date of Eval: 12/10/2022              Referring Diagnosis(es):      Past Medical History:  has a past medical history of Bladder stone, CAD (coronary artery disease), Diverticulitis, GERD (gastroesophageal reflux disease), Hyperlipidemia, Hypertension, Intermittent self-catheterization of bladder, LBBB (left bundle branch block), MI (myocardial infarction) ([address] Neuropathy, Polio, and Wears glasses. Past Surgical History:  has a past surgical history that includes Coronary artery bypass graft (); Coronary angioplasty with stent; Cardiac surgery (); Colonoscopy; Endoscopy, colon, diagnostic; and Bladder stone removal.               Type of Study: Initial MBS       Patient Complaints/Reason for Referral:  Chris Morales was referred for a MBS to assess the efficiency of his/her swallow function, assess for aspiration, and to make recommendations regarding safe dietary consistencies, effective compensatory strategies, and safe eating environment.        Onset of problem: (per GI note) Chris Morales is a  81 y.o.  male who presents with Abdominal Pain (Loss of appetite)     This elderly gentleman was evaluated with his wife at bedside and EMR was reviewed  It appears that patient has been hospitalized in the past with history for some diverticulitis as well as urology issues had cystoscopy done in the past     He has been seen and followed by gastroenterologist at [facility] clinic  Suspected to have some liver problems     He says that since last admission he has not been feeling well has been feeling weak tired fatigue has lost his appetite not able to eat much     He is also found to have some ascites  Also is anemic  He denies any overt bleeding melanotic stools  He has persistent abdominal pain  He has been losing weight  He denies any fever  Has nausea but no vomiting  He also had some diarrhea which seems to be improving now without any rectal bleeding melanotic stools     He has been planned to have GI work-up including EGD and colonoscopy done as an outpatient                  Behavior/Cognition/Vision/Hearing:  Behavior/Cognition: Alert; Cooperative  Hearing: Exceptions to [facility]    Impressions:  Patient presents with safe swallow for Easy to Chew diet with thin liquids, (upright at 90 degrees and double swallow) as evidenced by no aspiration with consistencies tested. +premature spill with all consistencies and min-mod vallecula residue decreased with double swallow (cued). Recommend small sips and bites, only feed when alert and awake and upright at 90 degrees for all PO intake. Recommend close monitoring for overt/clinical s/s of aspiration and D/C PO intake and complete Modified Barium Swallow Study should they occur. Results and recommendations reported to RN. Patient Position: Lateral    Consistencies Administered: All            Dysphagia Outcome Severity Scale: Level 6: Within functional limits/Modified independence  Penetration-Aspiration Scale (PAS): 1 - Material does not enter the airway    Recommended Diet:  Solid consistency: Easy to Chew  Liquid consistency: Thin  Liquid administration via: Straw    Medication administration: Meds in puree whole    Safe Swallow Protocol:     Compensatory Swallowing Strategies :  Alternate solids and liquids;Small bites/sips;Swallow 2 times per bite/sip;Eat/Feed slowly;Upright as possible for all oral intake        Recommendations/Treatment  Requires SLP Intervention: Yes   Frequency: 1-2x/week     D/C Recommendations: Ongoing speech therapy is recommended during this hospitalization         Recommended Exercises:    Therapeutic Interventions: Diet tolerance monitoring;Patient/Family education         Education: Images and recommendations were reviewed with pt, RN following this exam.   Patient Education: yes  Patient Education

## 2022-12-10 NOTE — PROGRESS NOTES
[] Medication Reconciliation was completed and the patient's home medication list was verified. The Med List Status is \"Complete\". The following sources were used to assist with Medication Reconciliation:    [] Patient had a list of medications which was transcribed into the EHR. [] Patient provided bottles of their medications    [x] Home medications reviewed and confirmed with Maida Camp    [] Contacted patient's pharmacy to confirm home medications    [] Contacted patient's physician office to confirm home medications    [] Medical Records from another facility and/or Care Everywhere were reviewed      [] There are one or more home medications that need clarification before Medication Reconciliation can be completed. The Med List Status has been marked as In Progress. To assist with Home Medication Reconciliation the following actions have been taken:    [] Pharmacy medication reconciliation service requested.  (Note: This can be done by sending a Perfect Serve message to [facility] Pharmacist or by Monica Lozano [phone].)  [] Family requested to bring medications into the hospital  [] Family requested to call hospital with medication list  [] Message left with physician office  [] Request for medical records made to n/a  [] Other n/a

## 2022-12-10 NOTE — PROGRESS NOTES
[] Medication Reconciliation was completed and the patient's home medication list was verified. The Med List Status is \"Complete\". The following sources were used to assist with Medication Reconciliation:    [] Patient had a list of medications which was transcribed into the EHR. [] Patient provided bottles of their medications    [] Home medications reviewed and confirmed with N/A    [] Contacted patient's pharmacy to confirm home medications    [] Contacted patient's physician office to confirm home medications    [] Medical Records from another facility and/or Care Everywhere were reviewed      [] There are one or more home medications that need clarification before Medication Reconciliation can be completed. The Med List Status has been marked as In Progress. To assist with Home Medication Reconciliation the following actions have been taken:    [x] Pharmacy medication reconciliation service requested.  (Note: This can be done by sending a Perfect Serve message to [facility] Pharmacist or by Phillip Hubbard [phone].)  [] Family requested to bring medications into the hospital  [x] Family requested to call hospital with medication list  [] Message left with physician office  [] Request for medical records made to 12/10  [] Other n/a

## 2022-12-10 NOTE — DISCHARGE INSTR - COC
Continuity of Care Form    Patient Name: Kandace Kern   :    MRN:  [id number]    Admit date:  2022  Discharge date:      Code Status Order: Full Code   Advance Directives:     Admitting Physician:  Conner Correa MD  PCP: Radha Reilly DO    Discharging Nurse: Renard Ramirez Unit/Room#: 1017/1017-02  Discharging Unit Phone Number: [phone]    Emergency Contact:   Extended Emergency Contact Information  Primary Emergency Contact: Sofia Agudelo  Address: [address] [place] [address],[address]           [place], [address] Phone: [phone]  Mobile Phone: [phone]  Relation: Spouse  Secondary Emergency Contact: Mireya Ramirez  Address: [address] [place] [address] Phone: [phone]  Relation: Child    Past Surgical History:  Past Surgical History:   Procedure Laterality Date    BLADDER STONE REMOVAL      CARDIAC SURGERY      CABG    COLONOSCOPY      CORONARY ANGIOPLASTY WITH STENT PLACEMENT      4 stents    CORONARY ARTERY BYPASS GRAFT      triple vessel    ENDOSCOPY, COLON, DIAGNOSTIC         Immunization History: There is no immunization history on file for this patient.     Active Problems:  Patient Active Problem List   Diagnosis Code    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    LBBB (left bundle branch block) I44.7    S/P CABG (coronary artery bypass graft) Z95.1    Unstable angina (HCC) I20.0    S/P cardiac cath Z98.890    S/P ICD (internal cardiac defibrillator) procedure Z95.810    Other ascites R18.8    Lower abdominal pain R10.30    Diffuse abdominal pain R10.84    Failure to thrive in adult R62.7    Anorexia R63.0       Isolation/Infection:   Isolation            No Isolation          Patient Infection Status       Infection Onset Added Last Indicated Last Indicated By Review Planned Expiration Resolved Resolved By    None active    Resolved    COVID-19 (Rule Out) 22 12/09/22 COVID-19, Rapid (Ordered)   12/09/22 Rule-Out Test Resulted            Nurse Assessment:  Last Vital Signs: BP (!) 121/58   Pulse 62   Temp 97.5 °F (36.4 °C) (Oral)   Resp 18   Ht 5' 9\" (1.753 m)   Wt 145 lb (65.8 kg)   SpO2 93%   BMI 21.41 kg/m²     Last documented pain score (0-10 scale): Pain Level: 0  Last Weight:   Wt Readings from Last 1 Encounters:   12/09/22 145 lb (65.8 kg)     Mental Status:  alert    IV Access:  - None    Nursing Mobility/ADLs:  Walking   Assisted  Transfer  Assisted  Bathing  Dependent  Dressing  Dependent  Toileting  Dependent  Feeding  Assisted  Med Admin  Assisted  Med Delivery   whole    Wound Care Documentation and Therapy:        Elimination:  Continence: Bowel: Yes  Bladder: Yes  Urinary Catheter: Insertion Date: 12/11    Colostomy/Ileostomy/Ileal Conduit: No       Date of Last BM: 12/16    Intake/Output Summary (Last 24 hours) at 12/10/2022 1221  Last data filed at 12/10/2022 0023  Gross per 24 hour   Intake --   Output 150 ml   Net -150 ml     I/O last 3 completed shifts:  In: -   Out: 150 [Urine:150]    Safety Concerns:     History of Falls (last 30 days) and At Risk for Falls    Impairments/Disabilities:      Weakened     Nutrition Therapy:  Current Nutrition Therapy:   - Oral Diet:  General    Routes of Feeding: Oral  Liquids: No Restrictions  Daily Fluid Restriction: no  Last Modified Barium Swallow with Video (Video Swallowing Test): not done    Treatments at the Time of Hospital Discharge:   Respiratory Treatments: see mar   Oxygen Therapy:  is not on home oxygen therapy.   Ventilator:    - No ventilator support    Rehab Therapies:   Weight Bearing Status/Restrictions: No weight bearing restrictions  Other Medical Equipment (for information only, NOT a DME order):  walker  Other Treatments:   Per hospice protocol   Drain peritoneal fluid twice weekly and prn per aspira drain   Albright catheter for comfort       Patient's personal belongings (please select all that are sent with patient):  None    RN SIGNATURE:  Electronically signed by Noé Morales RN on 12/20/22 at 12:01 PM EST    CASE MANAGEMENT/SOCIAL WORK SECTION    Inpatient Status Date: 12/9    Readmission Risk Assessment Score:  Readmission Risk              Risk of Unplanned Readmission:  11           If home care is needed:  Name: hospice [address] [place] [address]   Phone: [phone]        / signature: Electronically signed by Helen Pérez RN on 12/10/22 at 12:22 PM EST  Electronically signed by Edilson Bo RN on 12/20/2022 at 8:50 AM      PHYSICIAN SECTION    Prognosis: Fair    Condition at Discharge: Stable    Rehab Potential (if transferring to Rehab): Fair    Recommended Labs or Other Treatments After Discharge:     Physician Certification: I certify the above information and transfer of Ludy Odell  is necessary for the continuing treatment of the diagnosis listed and that he requires Hospice for greater 30 days.      Update Admission H&P: No change in H&P    PHYSICIAN SIGNATURE:  Electronically signed by Rhonda Yates MD on 12/20/22 at 7:50 AM EST

## 2022-12-10 NOTE — PROGRESS NOTES
Occupational Therapy  Facility/Department: [facility] CARE  Occupational Therapy Initial Assessment    Name: Ruby Abraham  : 1940  MRN: [id number]  Date of Service: 12/10/2022    Discharge Recommendations:  Continue to assess pending progress     RN reports patient is medically stable for therapy treatment this date. Chart reviewed prior to treatment and patient is agreeable for therapy. All lines intact and patient positioned comfortably at end of treatment. All patient needs addressed prior to ending therapy session. PMH: Patient is an 72-year-old male who is brought in by family for weight loss, loss of appetite, abdominal pain and ascites. Family ports patient was treated for diverticulitis requiring hospitalization in 10/2022. Patient never fully recovered and since that time has had persistent abdominal pain, lack of appetite, weight loss, and increased ascites. No fevers, vomiting. No cough, shortness of breath, chest pain. He has upper endoscopy planned for next week. There was discussion with doctors regarding paracentesis however they deferred procedure until January for unknown reasons. Patient Diagnosis(es): The primary encounter diagnosis was Lower abdominal pain. Diagnoses of Other ascites, Failure to thrive in adult, and Diverticulosis of colon were also pertinent to this visit. Past Medical History:  has a past medical history of Bladder stone, CAD (coronary artery disease), Diverticulitis, GERD (gastroesophageal reflux disease), Hyperlipidemia, Hypertension, Intermittent self-catheterization of bladder, LBBB (left bundle branch block), MI (myocardial infarction) ([facility] [address] Neuropathy, Polio, and Wears glasses. Past Surgical History:  has a past surgical history that includes Coronary artery bypass graft (); Coronary angioplasty with stent; Cardiac surgery ();  Colonoscopy; Endoscopy, colon, diagnostic; and Bladder stone removal.           Assessment Performance deficits / Impairments: Decreased functional mobility ; Decreased ADL status; Decreased strength;Decreased endurance;Decreased balance;Decreased cognition  Prognosis: Good  Decision Making: Medium Complexity  REQUIRES OT FOLLOW-UP: Yes  Activity Tolerance  Activity Tolerance: Patient limited by fatigue        Plan     4-5 X per week for pt would benefit from strengthening, balance training, functional mobility, endurance training, cognitive and safety training and ADL. Pt would benefit from additional skilled OT services due to a decrease in ability to complete ADL , safety, and functional mobility.       Restrictions  Restrictions/Precautions  Restrictions/Precautions: Fall Risk, Bed Alarm, General Precautions  Required Braces or Orthoses?: No    Subjective   General  Chart Reviewed: Yes  Patient assessed for rehabilitation services?: Yes  Family / Caregiver Present: Yes (wife)  No pain noted  Social/Functional History  Social/Functional History  Lives With: Spouse, Daughter  Type of Home: House  Home Layout: Two level, Able to Live on Main level with bedroom/bathroom  Home Access: Stairs to enter with rails  Entrance Stairs - Number of Steps: 5  Entrance Stairs - Rails: Both  Bathroom Shower/Tub: Tub/Shower unit  Bathroom Toilet: Handicap height  Bathroom Equipment: Toilet raiser, Grab bars around toilet  Home Equipment: Pat Mask, rolling, Cane  Has the patient had two or more falls in the past year or any fall with injury in the past year?: Yes (but no injuries per wife)  Receives Help From: Family  ADL Assistance: Needs assistance  Bath: Minimal assistance  Dressing: Minimal assistance  Grooming:  (UB min assist/ LB Mod asst)  Feeding: Supervision  Toileting: Independent  Homemaking Assistance: Needs assistance  Ambulation Assistance: Needs assistance  Transfer Assistance: Needs assistance  Active : No  Patient's  Info: wife  Occupation: Retired  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, fishing boat charter  Leisure & Hobbies: fishing on charter boat       Objective   Heart Rate: 62  Heart Rate Source: Monitor  BP: (!) 121/58  BP Location: Left upper arm  Patient Position: Supine  MAP (Calculated): 79  Resp: 18  SpO2: 93 %  O2 Device: None (Room air)          Observation/Palpation  Posture: Good  Safety Devices  Type of Devices: All fall risk precautions in place; Bed alarm in place;Call light within reach;Gait belt;Left in bed;Patient at risk for falls;Nurse notified (wife was present upon completion of eval)     Toilet Transfers  Toilet Transfer: Moderate assistance  AROM: Within functional limits  Strength: Within functional limits  Coordination:  (mild tremors noted throughout)  ADL  Feeding: Setup  Grooming: Setup  UE Bathing: Moderate assistance  LE Bathing: Maximum assistance  UE Dressing: Moderate assistance  LE Dressing: Maximum assistance  Toileting: Moderate assistance  Tone RUE  RUE Tone: Normotonic  Tone LUE  LUE Tone: Normotonic  Coordination  Movements Are Fluid And Coordinated: Yes  Activity Tolerance  Activity Tolerance: Patient limited by endurance  Activity Tolerance Comments: pt was able to complete the evaluation, and his current functional level is 3 steps to the R at the side of the bed. Bed mobility  Rolling to Left: Minimal assistance  Rolling to Right: Minimal assistance  Supine to Sit: Moderate assistance  Sit to Supine: Moderate assistance  Scooting: Minimal assistance  Transfers  Sit to stand: Minimal assistance  Stand to sit: Minimal assistance  Transfer Comments: cues for  for hand and foot placement  Vision  Vision: Within Functional Limits  Hearing  Hearing: Exceptions to [facility]  Hearing Exceptions: Hard of hearing/hearing concerns  Cognition  Overall Cognitive Status: WFL (however, his wife does note that he has dementia)  Arousal/Alertness: Appropriate responses to stimuli  Following Commands:  Follows all commands without difficulty  Attention Span: Appears intact  Memory: Decreased long term memory;Decreased short term memory  Safety Judgement: Good awareness of safety precautions  Problem Solving: Able to problem solve independently  Insights: Fully aware of deficits  Initiation: Does not require cues  Sequencing: Does not require cues  Patient affect[de-identified] Normal  Orientation  Overall Orientation Status: Within Functional Limits  Orientation Level: Oriented X4                  Education Given To: Caregiver;Patient  Education Provided: Role of Therapy;Plan of Care;Transfer Training  Education Method: Demonstration;Verbal  Barriers to Learning: Cognition  Education Outcome: Verbalized understanding;Continued education needed           Hand Dominance  Hand Dominance:  (ambidextrous)            G-Code     OutComes Score                                                  AM-PAC Score        AM-PAC Inpatient Daily Activity Raw Score: 14 (12/10/22 1352)  AM-PAC Inpatient ADL T-Scale Score : 33.39 (12/10/22 [facility])  ADL Inpatient CMS 0-100% Score: 59.67 (12/10/22 1352)  ADL Inpatient CMS G-Code Modifier : CK (12/10/22 [facility])    Tinneti Score       Goals  4-5 X per week, pt would benefit from strengthening, balance training, functional mobility, endurance training, cognitive and safety training and ADL. Short Term Goals  Time Frame for Short Term Goals: For LOS in hospital, pt will  Short Term Goal 1: complete grooming task with setup  Short Term Goal 2: complete UB bathe and dress with min assist  Short Term Goal 3: complete UB bathe and dress with mod assist  Short Term Goal 4: complete toileting task with min assist  Short Term Goal 5: complete functional mobility for ADL with CG  Patient Goals   Patient goals : To get stronger       Therapy Time   Individual Concurrent Group Co-treatment   Time In  1200         Time Out  1230         Minutes  30          Additional 10 minutes for chart review and patient care coordination.        Ania Smith Manuel Guzman

## 2022-12-10 NOTE — PROGRESS NOTES
[facility]  PROGRESS NOTE    Room # 1017/1017-02   Name: Cary Goodwin              Reason for visit: Routine    I visited the patient. Admit Date & Time: 12/9/2022  5:11 PM    Assessment:  Cary Goodwin is a 81 y.o. male. Upon entering the room patient states about their medical condition, states struggles with their medical situation. States worries, fears frustrations. Patient states well , treated well. Patient states good family support, shares about spiritual life, [employer] background, shares [employer] beliefs. Patient shares about outside interests. Intervention:   provided a ministry presence, listening and prayer. Outcome:  Patient open to visit. Plan:  Chaplains will remain available to offer spiritual and emotional support as needed. Electronically signed by Chaplain Jignesh, on 12/10/2022 at 3:06 PM.  Rickie      12/10/22 1504   Encounter Summary   Service Provided For: Patient and family together   Referral/Consult From: [facility] Family members   Last Encounter  12/10/22   Complexity of Encounter Moderate   Begin Time 0219   End Time  0225   Total Time Calculated 6 min   Encounter    Type Initial Screen/Assessment   Assessment/Intervention/Outcome   Assessment Passive   Intervention Active listening;Discussed illness injury and its impact; Discussed relationship with God;Prayer (assurance of)/[place];Sustaining Presence/Ministry of presence   Outcome Engaged in conversation;Expressed feelings, needs, and concerns;Receptive

## 2022-12-10 NOTE — ED PROVIDER NOTES
EMERGENCY DEPARTMENT ENCOUNTER    Pt Name: Oriana Espinal  MRN: [id number]  [place] 1940  Date of evaluation: 12/9/22  CHIEF COMPLAINT       Chief Complaint   Patient presents with    Abdominal Pain     Loss of appetite     HISTORY OF PRESENT ILLNESS   Patient is an 60-year-old male who is brought in by family for weight loss, loss of appetite, abdominal pain and ascites. Family ports patient was treated for diverticulitis requiring hospitalization in 10/2022. Patient never fully recovered and since that time has had persistent abdominal pain, lack of appetite, weight loss, and increased ascites. No fevers, vomiting. No cough, shortness of breath, chest pain. He has upper endoscopy planned for next week. There was discussion with doctors regarding paracentesis however they deferred procedure until January for unknown reasons. REVIEW OF SYSTEMS     Review of Systems   All other systems reviewed and are negative.   PASTMEDICAL HISTORY     Past Medical History:   Diagnosis Date    Bladder stone     CAD (coronary artery disease)     MI, Stents, CABG    Diverticulitis     GERD (gastroesophageal reflux disease)     Hyperlipidemia     Hypertension     Intermittent self-catheterization of bladder     LBBB (left bundle branch block) 3/4/2017    MI (myocardial infarction) (HCC)     Neuropathy     bilat toes    Polio     age 10    Wears glasses      SURGICAL HISTORY       Past Surgical History:   Procedure Laterality Date    BLADDER STONE REMOVAL      CARDIAC SURGERY  2000    CABG    COLONOSCOPY      CORONARY ANGIOPLASTY WITH STENT PLACEMENT      4 stents    CORONARY ARTERY BYPASS GRAFT  2000    triple vessel    ENDOSCOPY, COLON, DIAGNOSTIC       CURRENT MEDICATIONS       Previous Medications    ACETAMINOPHEN (TYLENOL) 500 MG TABLET    Take 500 mg by mouth    AMITRIPTYLINE (ELAVIL) 25 MG TABLET    Take 25 mg by mouth nightly    CLOPIDOGREL (PLAVIX) 75 MG TABLET    Take 75 mg by mouth daily    CYANOCOBALAMIN 2500 MCG TABS    Take 5,000 mcg by mouth    DIPHENHYDRAMINE-APAP, SLEEP, (TYLENOL PM EXTRA STRENGTH PO)    Take 1 tablet by mouth as needed     DONEPEZIL (ARICEPT) 10 MG TABLET    Take 10 mg by mouth 2 times daily    FAMOTIDINE (PEPCID) 20 MG TABLET    famotidine 20 mg tablet   Take 1 tablet twice a day by oral route as needed. FINASTERIDE (PROSCAR) 5 MG TABLET    Take 5 mg by mouth daily    LACTOBACILLUS (ACIDOPHILUS) 100 MG CAPS    Take by mouth    MAGNESIUM 400 MG TABS    Take 400 mg by mouth    MELATONIN 10 MG CAPS    Take 10 mg by mouth    MUPIROCIN (BACTROBAN) 2 % OINTMENT    Apply 1 each topically 3 times daily Apply topically 3 times daily. NITROGLYCERIN (NITROSTAT) 0.4 MG SL TABLET    Place 0.4 mg under the tongue every 5 minutes as needed for Chest pain up to max of 3 total doses. If no relief after 1 dose, call 911. ONDANSETRON (ZOFRAN) 4 MG TABLET    Take 4 mg by mouth every 8 hours as needed for Nausea or Vomiting    PREGABALIN (LYRICA) 100 MG CAPSULE    Take 100 mg by mouth 2 times daily. ROSUVASTATIN (CRESTOR) 20 MG TABLET    Take 20 mg by mouth daily    SUCRALFATE (CARAFATE) 1 GM TABLET    Take 1 g by mouth 4 times daily    TRAZODONE (DESYREL) 50 MG TABLET    Take 50 mg by mouth nightly    VALACYCLOVIR (VALTREX) 1 G TABLET    Take 1,000 mg by mouth as needed    VITAMIN D-3 (CHOLECALCIFEROL) 125 MCG (5000 UT) TABS    Take 5,000 Units by mouth     ALLERGIES     is allergic to codeine, morphine, pletal [cilostazol], sulfamethoxazole-trimethoprim, and pcn [penicillins]. FAMILY HISTORY     has no family status information on file.       SOCIAL HISTORY       Social History     Tobacco Use    Smoking status: Never    Smokeless tobacco: Never   Vaping Use    Vaping Use: Never used   Substance Use Topics    Alcohol use: No    Drug use: No     PHYSICAL EXAM     INITIAL VITALS: BP (!) 120/59   Pulse 96   Temp 98.1 °F (36.7 °C)   Resp 18   Ht 5' 9\" (1.753 m)   Wt 145 lb (65.8 kg)   SpO2 94%   BMI 21.41 kg/m²    Physical Exam  Constitutional:       Appearance: Normal appearance. HENT:      Head: Normocephalic. Right Ear: External ear normal.      Left Ear: External ear normal.      Nose: Nose normal.   Eyes:      Conjunctiva/sclera: Conjunctivae normal.   Cardiovascular:      Rate and Rhythm: Normal rate and regular rhythm. Heart sounds: Normal heart sounds. Pulmonary:      Effort: Pulmonary effort is normal.   Abdominal:      General: Abdomen is flat. There is distension. Palpations: Abdomen is soft. Tenderness: There is generalized abdominal tenderness. There is guarding. There is no left CVA tenderness. Musculoskeletal:      Cervical back: No muscular tenderness. Skin:     General: Skin is dry. Neurological:      Mental Status: He is alert. Mental status is at baseline. Psychiatric:         Mood and Affect: Mood normal.         Behavior: Behavior normal.       MEDICAL DECISION MAKING:   Temperature was 98.1, pulse was 96, and blood pressure was 120/59. Pulse oximetry on room air was 94%. Exam notable for thin appearing male, generalized abdominal tenderness with abdominal distention. Based on history exam differential is diverticulosis/diverticulitis, liver cirrhosis. ED Course as of 12/09/22 2046   Fri Dec 09, 2022   1725 Vitally stable  Pale, looks ill   Soft abdomen, no rigidity  Abdominal distension, normal bowel sounds   Tenderness of the lower abdomen and LLQ.   Dry skin and lips [YK]   1741 CBC, BMP, CT abdomen and pelvis with contrast.  Will give 1L fluid bolus and  IV Zofran [YK]   8414 Basic Metabolic Panel(!):    Glucose, Random 122(!)   BUN,BUNPL 12   Creatinine 1.01   Est, Glom Filt Rate >60   Bun/Cre Ratio 12   CALCIUM, SERUM, 663610 8.4(!)   Sodium 138   Potassium 3.9   Chloride 98   CO2 29   Anion Gap 11 [YK]   1831 CBC with Auto Differential(!):    WBC 10.3   RBC 4.47   Hemoglobin Quant 12.7(!)   Hematocrit 39.7(!)   MCV 88.8   MCH 28.4   MCHC 32.0 RDW 13.6   Platelet Count 505   MPV 10.7   NRBC Automated 0.0   Seg Neutrophils 78(!)   Lymphocytes 9(!)   Monocytes 12   Eosinophils % 0(!)   Basophils 0   Immature Granulocytes 1(!)   Segs Absolute 7.94   Absolute Lymph # 0.96(!)   Absolute Mono # 1.28(!)   Absolute Eos # <0.03   Basophils Absolute <0.03   Absolute Immature Granulocyte 0.07 [YK]   1954 CT ABDOMEN PELVIS W IV CONTRAST Additional Contrast? None  No diverticulitis on CT scan, moderate ascites  [YK]   2008 Decision to admit  [YK]      ED Course User Index  [YK] Genaro Scott MD     CRITICAL CARE:     The 30 ml/kg fluid bolus is not ordered due to concern for fluid overload and/or heart failure. PROCEDURES:    Procedures    DIAGNOSTIC RESULTS   EKG:All EKG's are interpreted by the Emergency Department Physician who either signs or Co-signs this chart in the absence of a cardiologist.        RADIOLOGY:All plain film, CT, MRI, and formal ultrasound images (except ED bedside ultrasound) are read by the radiologist, see reports below, unless otherwisenoted in MDM or here. CT ABDOMEN PELVIS W IV CONTRAST Additional Contrast? None   Final Result   Moderate ascites with associated moderate diffuse edema within the mesentery. Severe diverticulosis involving the left colon without evidence of acute   diverticulitis. No abscess or perforation. Numerous bladder diverticula. Laminectomy at L4 and L5. Bilateral gynecomastia. IR US GUIDED PARACENTESIS    (Results Pending)     LABS: All lab results were reviewed by myself, and all abnormals are listed below.   Labs Reviewed   BASIC METABOLIC PANEL - Abnormal; Notable for the following components:       Result Value    Glucose 122 (*)     Calcium 8.4 (*)     All other components within normal limits   CBC WITH AUTO DIFFERENTIAL - Abnormal; Notable for the following components:    Hemoglobin 12.7 (*)     Hematocrit 39.7 (*)     Seg Neutrophils 78 (*)     Lymphocytes 9 (*) Eosinophils % 0 (*)     Immature Granulocytes 1 (*)     Absolute Lymph # 0.96 (*)     Absolute Mono # 1.28 (*)     All other components within normal limits   RAPID INFLUENZA A/B ANTIGENS   COVID-19, RAPID   GRAM STAIN   CULTURE, BODY FLUID   LIPASE   PROTIME-INR   APTT   CELL COUNT WITH DIFFERENTIAL, BODY FLUID   PROTEIN, BODY FLUID   ALBUMIN, FLUID   GLUCOSE, BODY FLUID       EMERGENCY DEPARTMENTCOURSE:   Patient remained stable in the ED. Labs remarkable for potassium 3.9, creatinine 1.01, glucose 122, WBC 10.3, hemoglobin 0.7, flu negative. CT abdomen pelvis shows moderate ascites with diffuse edema within the mesentery. Also shows diverticulosis without diverticulitis, abscess or perforation. Advised family patient likely needs upper and lower endoscopy as well as paracentesis. I gave patient and family realistic expectations as it is the weekend and admission to the hospital does not continue that the above procedures will be performed. I did order coags and placed order for IR paracentesis for tomorrow if they are available. I discussed case with Vinita Santos, who accepts patient for admission to the hospital      Vitals:    Vitals:    12/09/22 1546   BP: (!) 120/59   Pulse: 96   Resp: 18   Temp: 98.1 °F (36.7 °C)   SpO2: 94%   Weight: 145 lb (65.8 kg)   Height: 5' 9\" (1.753 m)       The patient was given the following medications while in the emergency department:  Orders Placed This Encounter   Medications    0.9 % sodium chloride bolus    ondansetron (ZOFRAN) injection 4 mg    0.9 % sodium chloride bolus    iopamidol (ISOVUE-370) 76 % injection 75 mL    sodium chloride flush 0.9 % injection 10 mL    0.9 % sodium chloride infusion    sodium chloride flush 0.9 % injection 5-40 mL    sodium chloride flush 0.9 % injection 5-40 mL    0.9 % sodium chloride infusion     CONSULTS:  IP CONSULT TO INTERNAL MEDICINE    FINAL IMPRESSION      1. Lower abdominal pain    2. Other ascites    3. Failure to thrive in adult    4. Diverticulosis of colon          DISPOSITION/PLAN   DISPOSITION Decision To Admit 12/09/2022 08:15:42 PM      PATIENT REFERRED TO:  No follow-up provider specified.   DISCHARGE MEDICATIONS:  New Prescriptions    No medications on file     Justin Cotton MD  Attending Emergency Physician                   Luis Coppola MD  12/09/22 2046

## 2022-12-10 NOTE — PROGRESS NOTES
Physical Therapy  Facility/Department: [facility]  Physical Therapy Initial Assessment    Name: Ricardo Krishnan  : 1940  MRN: [id number]  Date of Service: 12/10/2022    Discharge Recommendations:  Continue to assess pending progress, Patient would benefit from continued therapy after discharge   PT Equipment Recommendations  Equipment Needed: No    80 yo M with hx of CAD s/p CABG, Neuropathy, HTN and recent dx of Diverticulitis who presented with worsening abdominal pain, nausea, vomiting and diarrhea. Unable to keep fluid down and had decreased urine reported by family. Zofran is not helping with his symptoms and family concerned that the Abx won't be effective as he throw up soon after he take it      Family reported that patient started to complain from lower abdominal pain last month for which he was given oral Abx for suspected Diverticulitis, however his symptoms was progressively getting worse and subsequently hospitalized and treated with IV abx and transitioned to oral Abx and discharged home last week with plan for colonoscopy to be done after 2 weeks and after patient completing the oral course of Abx. Of note, Pt has urological procedure done recently in Nov, Family  not sure about the details but reported it was for abscess drainage from bladder diverticulum. Patient Diagnosis(es): The primary encounter diagnosis was Lower abdominal pain. Diagnoses of Other ascites, Failure to thrive in adult, and Diverticulosis of colon were also pertinent to this visit. Past Medical History:  has a past medical history of Bladder stone, CAD (coronary artery disease), Diverticulitis, GERD (gastroesophageal reflux disease), Hyperlipidemia, Hypertension, Intermittent self-catheterization of bladder, LBBB (left bundle branch block), MI (myocardial infarction) ([facility] [address] Neuropathy, Polio, and Wears glasses.   Past Surgical History:  has a past surgical history that includes Coronary artery bypass graft (2000); Coronary angioplasty with stent; Cardiac surgery (2000); Colonoscopy; Endoscopy, colon, diagnostic; and Bladder stone removal.    Assessment   Body Structures, Functions, Activity Limitations Requiring Skilled Therapeutic Intervention: Decreased functional mobility ; Decreased ADL status; Decreased strength;Decreased endurance;Decreased sensation  Assessment: Overall, his gera LE strength and endurance are significantly diminished which limit his ability to stand and walk and therefore utilize the [facility] more often. His feet have impaired sensation which also adversely affects his abiltiy to stand  Therapy Prognosis: Good  Decision Making: High Complexity  Requires PT Follow-Up: Yes  Activity Tolerance  Activity Tolerance: Patient limited by endurance  Activity Tolerance Comments: pt was able to complete the evaluation, and his current functional level is 3 steps to the R at the side of the bed. Plan   Physcial Therapy Plan  General Plan: 5-7 times per week  Safety Devices  Type of Devices: All fall risk precautions in place, Bed alarm in place, Call light within reach, Gait belt, Left in bed, Patient at risk for falls, Nurse notified (wife was present upon completion of eval)     Restrictions  Restrictions/Precautions  Restrictions/Precautions: Fall Risk, Bed Alarm, General Precautions  Required Braces or Orthoses?: No     Subjective   Pain: No pain noted  General  Chart Reviewed: Yes  Patient assessed for rehabilitation services?: Yes  Response To Previous Treatment: Not applicable  Family / Caregiver Present: Yes (wife was present and was the primary historian)  Follows Commands: Within Functional Limits  General Comment  Comments: Precious Maldonado RN cleared pt for PT eval.  states he stood at EOB and took only a few steps.   reports he is primarily WC bound  Subjective  Subjective: pt and his wife are agreeable to PT eval.         Social/Functional History  Social/Functional History  Lives With: Spouse, Daughter  Type of Home: House  Home Layout: Two level, Able to Live on Main level with bedroom/bathroom  Home Access: Stairs to enter with rails  Entrance Stairs - Number of Steps: 5  Entrance Stairs - Rails: Both  Bathroom Shower/Tub: Tub/Shower unit  Bathroom Toilet: Handicap height  Bathroom Equipment: Toilet raiser, Grab bars around toilet  Home Equipment: Loretha Bud, rolling, Cane  Has the patient had two or more falls in the past year or any fall with injury in the past year?: Yes (but no injuries per wife)  Receives Help From: Family  ADL Assistance: Needs assistance  Bath: Minimal assistance  Dressing: Minimal assistance  Grooming:  (UB min assist/ LB Mod asst)  Feeding: Supervision  Toileting: Independent  Homemaking Assistance: Needs assistance  Ambulation Assistance: Needs assistance  Transfer Assistance: Needs assistance  Active : No  Patient's  Info: wife  Occupation: Retired  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, fishing boat charter  Leisure & Hobbies: fishing on charter boat  Vision/Hearing  Hearing  Hearing: Exceptions to [facility]  Hearing Exceptions: Hard of hearing/hearing concerns    Cognition   Orientation  Overall Orientation Status: Within Functional Limits  Orientation Level: Oriented X4  Cognition  Overall Cognitive Status: WFL (however, his wife does note that he has dementia)  Following Commands:  Follows all commands without difficulty  Attention Span: Appears intact  Memory: Decreased long term memory;Decreased short term memory  Safety Judgement: Good awareness of safety precautions  Problem Solving: Able to problem solve independently  Insights: Fully aware of deficits  Initiation: Does not require cues  Sequencing: Does not require cues     Objective   Heart Rate: 62  Heart Rate Source: Monitor  BP: (!) 121/58  BP Location: Left upper arm  Patient Position: Supine  MAP (Calculated): 79  Resp: 18  SpO2: 93 %  O2 Device: None (Room air) Observation/Palpation  Posture: Good        AROM RLE (degrees)  RLE AROM: WFL  AROM LLE (degrees)  LLE AROM : WFL  AROM RUE (degrees)  RUE General AROM: refer to OT eval  AROM LUE (degrees)  LUE General AROM: refer to OT eval  Strength RLE  Strength RLE: WNL  Comment: grossly 5/5 t/o LE  Strength LLE  Strength LLE: WNL  Comment: grossly 5/5 t/o LE  Strength RUE  Comment: refer to OT eval  Strength LUE  Comment: refer to OT eval           Bed mobility  Rolling to Left: Minimal assistance  Rolling to Right: Minimal assistance  Supine to Sit: Moderate assistance  Sit to Supine: Minimal assistance  Scooting: Minimal assistance  Transfers  Sit to Stand: Minimal Assistance  Stand to Sit: Minimal Assistance  Ambulation  Surface: Level tile  Device: Rolling Walker  Assistance: Minimal assistance;2 Person assistance  Distance: he took 3 steps to the R to move up in bed before he sat down. Comments: pt declined to walk any further than what he did today  More Ambulation?: No  Stairs/Curb  Stairs?: No     Balance  Posture: Good  Sitting - Static: Good  Sitting - Dynamic: Good  Standing - Static: Fair  Standing - Dynamic: Fair  Comments: pt's wife and RN state that anything more than a few steps that his legs will develop tremors. OutComes Score                                                  AM-PAC Score             Tinneti Score       Goals  Short Term Goals  Time Frame for Short Term Goals: 12 visits  Short Term Goal 1: to be independent with all bed mobility  Short Term Goal 2: to be independent with all transfers  Short Term Goal 3: to require min A x 1 for ambulation up to 10'  Short Term Goal 4: to be able to participate in at lesat 25 min of PT       Education  Patient Education  Education Given To: Patient; Family  Education Provided: Role of Therapy;Plan of Care  Education Method: Verbal  Barriers to Learning: None  Education Outcome: Verbalized understanding      Therapy Time   Individual Concurrent Group Co-treatment   Time In 1200         Time Out 1230         Minutes 30          +10 min for chart review         Farhana Fregoso, PT

## 2022-12-11 LAB
ABSOLUTE EOS #: 0.06 K/UL (ref 0–0.44)
ABSOLUTE IMMATURE GRANULOCYTE: 0.05 K/UL (ref 0–0.3)
ABSOLUTE LYMPH #: 1.03 K/UL (ref 1.1–3.7)
ABSOLUTE MONO #: 1.44 K/UL (ref 0.1–1.2)
ALBUMIN SERPL-MCNC: 2.6 G/DL (ref 3.5–5.2)
ALP BLD-CCNC: 65 U/L (ref 40–129)
ALT SERPL-CCNC: 10 U/L (ref 5–41)
ANION GAP SERPL CALCULATED.3IONS-SCNC: 9 MMOL/L (ref 9–17)
AST SERPL-CCNC: 22 U/L
BASOPHILS # BLD: 0 % (ref 0–2)
BASOPHILS ABSOLUTE: 0.03 K/UL (ref 0–0.2)
BILIRUB SERPL-MCNC: 0.3 MG/DL (ref 0.3–1.2)
BUN BLDV-MCNC: 9 MG/DL (ref 8–23)
BUN/CREAT BLD: 10 (ref 9–20)
CALCIUM SERPL-MCNC: 8.2 MG/DL (ref 8.6–10.4)
CHLORIDE BLD-SCNC: 103 MMOL/L (ref 98–107)
CO2: 26 MMOL/L (ref 20–31)
CREAT SERPL-MCNC: 0.86 MG/DL (ref 0.7–1.2)
EOSINOPHILS RELATIVE PERCENT: 1 % (ref 1–4)
GFR SERPL CREATININE-BSD FRML MDRD: >60 ML/MIN/1.73M2
GLUCOSE BLD-MCNC: 128 MG/DL (ref 70–99)
HCT VFR BLD CALC: 36.9 % (ref 40.7–50.3)
HEMOGLOBIN: 11.3 G/DL (ref 13–17)
IMMATURE GRANULOCYTES: 1 %
LYMPHOCYTES # BLD: 10 % (ref 24–43)
MAGNESIUM: 1.9 MG/DL (ref 1.6–2.6)
MCH RBC QN AUTO: 27.8 PG (ref 25.2–33.5)
MCHC RBC AUTO-ENTMCNC: 30.6 G/DL (ref 28.4–34.8)
MCV RBC AUTO: 90.9 FL (ref 82.6–102.9)
MONOCYTES # BLD: 13 % (ref 3–12)
NRBC AUTOMATED: 0 PER 100 WBC
PDW BLD-RTO: 13.5 % (ref 11.8–14.4)
PLATELET # BLD: 325 K/UL (ref 138–453)
PMV BLD AUTO: 10.9 FL (ref 8.1–13.5)
POTASSIUM SERPL-SCNC: 3.5 MMOL/L (ref 3.7–5.3)
RBC # BLD: 4.06 M/UL (ref 4.21–5.77)
SEG NEUTROPHILS: 75 % (ref 36–65)
SEGMENTED NEUTROPHILS ABSOLUTE COUNT: 8.14 K/UL (ref 1.5–8.1)
SMOOTH MUSCLE ANTIBODY: 12 UNITS (ref 0–19)
SODIUM BLD-SCNC: 138 MMOL/L (ref 135–144)
TOTAL PROTEIN: 5.5 G/DL (ref 6.4–8.3)
WBC # BLD: 10.8 K/UL (ref 3.5–11.3)

## 2022-12-11 PROCEDURE — 99232 SBSQ HOSP IP/OBS MODERATE 35: CPT | Performed by: INTERNAL MEDICINE

## 2022-12-11 PROCEDURE — 2580000003 HC RX 258: Performed by: NURSE PRACTITIONER

## 2022-12-11 PROCEDURE — 6370000000 HC RX 637 (ALT 250 FOR IP): Performed by: FAMILY MEDICINE

## 2022-12-11 PROCEDURE — 2060000000 HC ICU INTERMEDIATE R&B

## 2022-12-11 PROCEDURE — 36415 COLL VENOUS BLD VENIPUNCTURE: CPT

## 2022-12-11 PROCEDURE — 6360000002 HC RX W HCPCS: Performed by: NURSE PRACTITIONER

## 2022-12-11 PROCEDURE — 80053 COMPREHEN METABOLIC PANEL: CPT

## 2022-12-11 PROCEDURE — 83735 ASSAY OF MAGNESIUM: CPT

## 2022-12-11 PROCEDURE — 85025 COMPLETE CBC W/AUTO DIFF WBC: CPT

## 2022-12-11 PROCEDURE — APPSS30 APP SPLIT SHARED TIME 16-30 MINUTES: Performed by: NURSE PRACTITIONER

## 2022-12-11 PROCEDURE — 51702 INSERT TEMP BLADDER CATH: CPT

## 2022-12-11 PROCEDURE — 2580000003 HC RX 258: Performed by: FAMILY MEDICINE

## 2022-12-11 PROCEDURE — 6360000002 HC RX W HCPCS: Performed by: FAMILY MEDICINE

## 2022-12-11 PROCEDURE — 2580000003 HC RX 258: Performed by: INTERNAL MEDICINE

## 2022-12-11 PROCEDURE — 6360000002 HC RX W HCPCS: Performed by: INTERNAL MEDICINE

## 2022-12-11 PROCEDURE — 51798 US URINE CAPACITY MEASURE: CPT

## 2022-12-11 RX ADMIN — DONEPEZIL HYDROCHLORIDE 10 MG: 10 TABLET, FILM COATED ORAL at 08:54

## 2022-12-11 RX ADMIN — Medication 9 MG: at 19:50

## 2022-12-11 RX ADMIN — SODIUM CHLORIDE: 9 INJECTION, SOLUTION INTRAVENOUS at 06:20

## 2022-12-11 RX ADMIN — CEFTRIAXONE SODIUM 1000 MG: 1 INJECTION, POWDER, FOR SOLUTION INTRAMUSCULAR; INTRAVENOUS at 20:00

## 2022-12-11 RX ADMIN — ONDANSETRON 4 MG: 2 INJECTION INTRAMUSCULAR; INTRAVENOUS at 09:40

## 2022-12-11 RX ADMIN — POTASSIUM CHLORIDE 10 MEQ: 7.46 INJECTION, SOLUTION INTRAVENOUS at 10:24

## 2022-12-11 RX ADMIN — PREGABALIN 100 MG: 100 CAPSULE ORAL at 19:50

## 2022-12-11 RX ADMIN — ENOXAPARIN SODIUM 40 MG: 100 INJECTION SUBCUTANEOUS at 08:53

## 2022-12-11 RX ADMIN — SODIUM CHLORIDE, PRESERVATIVE FREE 10 ML: 5 INJECTION INTRAVENOUS at 08:54

## 2022-12-11 RX ADMIN — FINASTERIDE 5 MG: 5 TABLET, FILM COATED ORAL at 08:53

## 2022-12-11 RX ADMIN — SUCRALFATE 1 G: 1 TABLET ORAL at 19:50

## 2022-12-11 RX ADMIN — FAMOTIDINE 20 MG: 20 TABLET, FILM COATED ORAL at 08:53

## 2022-12-11 RX ADMIN — PREGABALIN 100 MG: 100 CAPSULE ORAL at 08:53

## 2022-12-11 RX ADMIN — SUCRALFATE 1 G: 1 TABLET ORAL at 17:51

## 2022-12-11 RX ADMIN — POTASSIUM CHLORIDE 10 MEQ: 7.46 INJECTION, SOLUTION INTRAVENOUS at 06:21

## 2022-12-11 RX ADMIN — ROSUVASTATIN CALCIUM 20 MG: 10 TABLET, FILM COATED ORAL at 08:53

## 2022-12-11 RX ADMIN — SODIUM CHLORIDE: 9 INJECTION, SOLUTION INTRAVENOUS at 19:59

## 2022-12-11 RX ADMIN — SUCRALFATE 1 G: 1 TABLET ORAL at 08:53

## 2022-12-11 RX ADMIN — Medication 400 MG: at 08:53

## 2022-12-11 RX ADMIN — DONEPEZIL HYDROCHLORIDE 10 MG: 10 TABLET, FILM COATED ORAL at 19:50

## 2022-12-11 RX ADMIN — AMITRIPTYLINE HYDROCHLORIDE 25 MG: 25 TABLET, FILM COATED ORAL at 19:50

## 2022-12-11 RX ADMIN — DEXTROSE AND SODIUM CHLORIDE: 5; 450 INJECTION, SOLUTION INTRAVENOUS at 00:43

## 2022-12-11 RX ADMIN — POTASSIUM CHLORIDE 10 MEQ: 7.46 INJECTION, SOLUTION INTRAVENOUS at 08:53

## 2022-12-11 RX ADMIN — POTASSIUM CHLORIDE 10 MEQ: 7.46 INJECTION, SOLUTION INTRAVENOUS at 11:27

## 2022-12-11 RX ADMIN — CLOPIDOGREL BISULFATE 75 MG: 75 TABLET ORAL at 08:53

## 2022-12-11 RX ADMIN — SUCRALFATE 1 G: 1 TABLET ORAL at 13:23

## 2022-12-11 NOTE — CARE COORDINATION
Social work: :spoke to wife and daughter there is a procedure tomorrow. They are waiting for those results to finish plans. If snf [facility]don[facility] is the checo choice they have the referral. If home care any agency that services their home in [facility] Will need ale, Rx and discharge order for snf or home care.  Chandrika bell

## 2022-12-11 NOTE — PLAN OF CARE
Pt was admitted with nausea/diarrhea and ascites. Plan was for pt to have a paracentesis today but IR unable to come in. Pt will have paracentesis on Monday. Swallow study performed d/t pt wife reporting that he had issues with drinking - passed with just aspiration precautions in place and to double swallow after each bite/drink. BP and HR stable, potassium replaced by IV. Plan currently is to go home with wife on discharge. Problem: Safety - Adult  Goal: Free from fall injury  Outcome: Progressing  Patient is fall risk per fall scale. Falling star on door. Fall sticker on armband. Hourly rounding performed. Personal belongings and call light within reach. Bed in low position.

## 2022-12-11 NOTE — PLAN OF CARE
Patient alert with confusion, two assist with walker, wife at bedside, no new s/s of skin breakdown or injury. Problem: Skin/Tissue Integrity  Goal: Absence of new skin breakdown  Description: 1. Monitor for areas of redness and/or skin breakdown  2. Assess vascular access sites hourly  3. Every 4-6 hours minimum:  Change oxygen saturation probe site  4. Every 4-6 hours:  If on nasal continuous positive airway pressure, respiratory therapy assess nares and determine need for appliance change or resting period.   12/11/2022 0405 by Pretty Skinner RN  Outcome: Progressing     Problem: Safety - Adult  Goal: Free from fall injury  12/10/2022 1914 by Pamala Lefort, RN  Outcome: Progressing     Problem: ABCDS Injury Assessment  Goal: Absence of physical injury  12/11/2022 0405 by Pretty Skinner RN  Outcome: Progressing

## 2022-12-11 NOTE — PROGRESS NOTES
[facility].,    Adult Hospitalist      Name: Ludy Odell  MRN: [id number]     Acct: [de-identified]  Room: [facility]-02    Admit Date: 12/9/2022  5:11 PM  PCP: Osmel Chin DO    Primary Problem  Principal Problem:    Other ascites  Active Problems:    Lower abdominal pain    Diffuse abdominal pain    Failure to thrive in adult    Anorexia  Resolved Problems:    * No resolved hospital problems. *        Assesment:     Lower abdominal pain  Question Spontaneous bacterial peritonitis   Ascites  Failure to thrive  Diverticulosis  Coronary artery disease, native vessel  Status post CABG  Left bundle branch block  Benign prostatic hypertrophy  Mixed hyperlipidemia  Major depressive disorder  Dementia  Gastroesophageal reflux disease without esophagitis  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  Polio at age 10        Plan:     Admit to progressive  Monitor vitals closely  Keep SPO2 above 90%  I's and O's  IV fluids  IV Abx  Consult ID  Pain control  Antiemetics as needed  Spironolactone  Resume other essential home medication  Add parameters  Patient requires paracentesis  Called interventional radiology and discussed with Dr. Castillo Agent 12/10  Left message w IR 12/11  GI on board  ID consulted  Abx rec by GI/ID  CBC, BMP  Discussed with wife at bedside and granddaughter on phone  Consult Nutrition   DVT and GI prophylaxis.         Chief Complaint:     Chief Complaint   Patient presents with    Abdominal Pain     Loss of appetite         History of Present Illness:        Pt seen and examined at bedside  Last 24 hr events d/w RN    Pt says he feels the same  Has pain w ambulation  C/o weakness even with sitting up in bed    Denies CP, dyspnea, cough, wheezing  Denies vomiting, rash, joint swelling  Denies headache, fever, vision change      Initial HPI   Ludy Odell is a 81 y.o.  male who presents with Abdominal Pain (Loss of appetite)    Patient admitted to the emergency room where he presented with progressive concerns with anorexia and weight loss. Patient has been found to have ascites. He has been complaining of progressive abdominal pain for last several weeks. Seen by his GI who referred him to the emergency room we will undergo a diagnostic and therapeutic paracentesis. Have consulted interventional radiology department has a protocol of not performing paracentesis over the weekend at [facility]. As the case with the radiologist on-call they feel the patient is not in any respiratory distress so it is not urgent to perform the test they will try to get it done earlier than Monday. Also had an episode of acute diverticulitis was 2 months ago. He  has continued intermittent abdominal pain since then. He got evaluated by GI who determined it was a worsening ascites causing the majority of his symptoms. He has been uncomfortable at times and has been worse when he is laying on his back. Also has history of dementia and has to be redirected. His wife is at bedside and has provided most of the history    Denies any chest pain, dyspnea. Denies headache, fever, chills, photophobia or neck pain. Denies vomiting, rash or joint swelling. Patient has diarrhea for several days. He usually have this 1-2 loose bowel movements daily but denies having any blood, mucus or pus    I have personally reviewed the past medical history, past surgical history, medications, social history, and family history, and summarized in the note. Review of Systems:     All 10 point system is reviewed and negative otherwise mentioned in HPI.       Past Medical History:     Past Medical History:   Diagnosis Date    Bladder stone     CAD (coronary artery disease)     MI, Stents, CABG    Diverticulitis     GERD (gastroesophageal reflux disease)     Hyperlipidemia     Hypertension     Intermittent self-catheterization of bladder     LBBB (left bundle branch block) 3/4/2017    MI (myocardial infarction) (HCC)     Neuropathy     bilat toes Polio     age 10    Wears glasses         Past Surgical History:     Past Surgical History:   Procedure Laterality Date    BLADDER STONE REMOVAL      CARDIAC SURGERY  2000    CABG    COLONOSCOPY      CORONARY ANGIOPLASTY WITH STENT PLACEMENT      4 stents    CORONARY ARTERY BYPASS GRAFT  2000    triple vessel    ENDOSCOPY, COLON, DIAGNOSTIC          Medications Prior to Admission:       Prior to Admission medications    Medication Sig Start Date End Date Taking? Authorizing Provider   pregabalin (LYRICA) 100 MG capsule Take 100 mg by mouth 2 times daily. Historical Provider, MD   finasteride (PROSCAR) 5 MG tablet Take 5 mg by mouth daily    Historical Provider, MD   donepezil (ARICEPT) 10 MG tablet Take 10 mg by mouth 2 times daily    Historical Provider, MD   amitriptyline (ELAVIL) 25 MG tablet Take 25 mg by mouth nightly    Historical Provider, MD   rosuvastatin (CRESTOR) 20 MG tablet Take 20 mg by mouth daily    Historical Provider, MD   traZODone (DESYREL) 50 MG tablet Take 50 mg by mouth nightly  Patient not taking: Reported on 12/10/2022    Historical Provider, MD   sucralfate (CARAFATE) 1 GM tablet Take 1 g by mouth 4 times daily    Historical Provider, MD   ondansetron (ZOFRAN) 4 MG tablet Take 4 mg by mouth every 8 hours as needed for Nausea or Vomiting    Historical Provider, MD   valACYclovir (VALTREX) 1 g tablet Take 1,000 mg by mouth as needed  Patient not taking: Reported on 12/10/2022 6/16/18   Historical Provider, MD   Melatonin 10 MG CAPS Take 10 mg by mouth    Historical Provider, MD   famotidine (PEPCID) 20 MG tablet famotidine 20 mg tablet   Take 1 tablet twice a day by oral route as needed.     Historical Provider, MD   acetaminophen (TYLENOL) 500 MG tablet Take 500 mg by mouth prn    Historical Provider, MD   Magnesium 400 MG TABS Take 400 mg by mouth    Historical Provider, MD   Lactobacillus (ACIDOPHILUS) 100 MG CAPS Take by mouth  Patient not taking: Reported on 12/10/2022    Historical Provider, MD   vitamin D-3 (CHOLECALCIFEROL) 125 MCG (5000 UT) TABS Take 5,000 Units by mouth    Historical Provider, MD   Cyanocobalamin 2500 MCG TABS Take 5,000 mcg by mouth    Historical Provider, MD   nitroGLYCERIN (NITROSTAT) 0.4 MG SL tablet Place 0.4 mg under the tongue every 5 minutes as needed for Chest pain up to max of 3 total doses. If no relief after 1 dose, call 911. Using as needed but has not needed them    Historical Provider, MD   clopidogrel (PLAVIX) 75 MG tablet Take 75 mg by mouth daily    Historical Provider, MD   Diphenhydramine-APAP, sleep, (TYLENOL PM EXTRA STRENGTH PO) Take 1 tablet by mouth as needed     Historical Provider, MD   mupirocin (BACTROBAN) 2 % ointment Apply 1 each topically 3 times daily Apply topically 3 times daily. Patient not taking: Reported on 12/10/2022    Historical Provider, MD        Allergies:       Ciprofloxacin, Codeine, Morphine, Pletal [cilostazol], Sulfamethoxazole-trimethoprim, and Pcn [penicillins]    Social History:     Tobacco:    reports that he has never smoked. He has never used smokeless tobacco.  Alcohol:      reports no history of alcohol use. Drug Use:  reports no history of drug use. Family History:     No family history on file.       Physical Exam:     Vitals:  BP (!) 100/59   Pulse 88   Temp 98.2 °F (36.8 °C) (Oral)   Resp 16   Ht 5' 9\" (1.753 m)   Wt 145 lb (65.8 kg)   SpO2 96%   BMI 21.41 kg/m²   Temp (24hrs), Av.2 °F (36.8 °C), Min:97.9 °F (36.6 °C), Max:98.6 °F (37 °C)      General appearance - alert, well appearing, and in no acute distress  Mental status - not oriented to person, place, and time with normal affect  Head - normocephalic and atraumatic  Eyes - pupils equal and reactive, extraocular eye movements intact, conjunctiva clear  Ears - hearing appears to be intact  Nose - no drainage noted  Mouth - mucous membranes moist  Neck - supple, no carotid bruits, thyroid not palpable  Chest - clear to auscultation, normal effort  Heart - normal rate, regular rhythm, no murmur  Abdomen - soft, tender, distended, bowel sounds present all four quadrants, no masses, hepatomegaly or splenomegaly  Neurological - normal speech, no focal findings or movement disorder noted, cranial nerves II through XII grossly intact  Extremities - peripheral pulses palpable, no pedal edema or calf pain with palpation  Skin - no gross lesions, rashes, or induration noted        Data:     Labs:    Hematology:  Recent Labs     12/09/22  1750 12/09/22  2110 12/10/22  0523 12/10/22  0610 12/11/22  0507   WBC 10.3  --   --  10.4 10.8   RBC 4.47  --   --  4.18* 4.06*   HGB 12.7*  --   --  11.7* 11.3*   HCT 39.7*  --   --  38.8* 36.9*   MCV 88.8  --   --  92.8 90.9   MCH 28.4  --   --  28.0 27.8   MCHC 32.0  --   --  30.2 30.6   RDW 13.6  --   --  13.5 13.5     --   --  298 325   MPV 10.7  --   --  10.7 10.9   INR  --  1.2 1.2  --   --        Chemistry:  Recent Labs     12/09/22  1750 12/10/22  0610 12/11/22  0507    138 138   K 3.9 3.3* 3.5*   CL 98 102 103   CO2 29 25 26   GLUCOSE 122* 97 128*   BUN 12 10 9   CREATININE 1.01 0.75 0.86   MG  --  1.9 1.9   ANIONGAP 11 11 9   LABGLOM >60 >60 >60   CALCIUM 8.4* 8.1* 8.2*       Recent Labs     12/09/22  2110 12/10/22  0610 12/10/22  1020 12/11/22  0507   PROT  --  5.7*  --  5.5*   LABALBU  --  2.7*  --  2.6*   AST  --  20  --  22   ALT  --  11  --  10   ALKPHOS  --  66  --  65   BILITOT  --  0.3  --  0.3   AMMONIA  --   --  26  --    LIPASE 29  --   --   --          Lab Results   Component Value Date    INR 1.2 12/10/2022    INR 1.2 12/09/2022    INR >13.9 (HH) 03/03/2017    PROTIME 15.1 (H) 12/10/2022    PROTIME 15.2 (H) 12/09/2022    PROTIME >150.0 (H) 03/03/2017       Lab Results   Component Value Date/Time    SPECIAL NOT REPORTED 02/18/2020 10:58 PM     Lab Results   Component Value Date/Time    CULTURE ENTEROCOCCUS FAECALIS >822841 CFU/ML (A) 02/18/2020 10:58 PM       No results found for: POCPH, PHART, PH, POCPCO2, REF7FRY, PCO2, POCPO2, PO2ART, PO2, POCHCO3, OJW7KAT, HCO3, NBEA, PBEA, BEART, BE, THGBART, THB, DEL0SEU, TZNG1WTG, Q1ZYKFSN, O2SAT, FIO2    Radiology:    CT ABDOMEN PELVIS W IV CONTRAST Additional Contrast? None    Result Date: 12/9/2022  Moderate ascites with associated moderate diffuse edema within the mesentery. Severe diverticulosis involving the left colon without evidence of acute diverticulitis. No abscess or perforation. Numerous bladder diverticula. Laminectomy at L4 and L5. Bilateral gynecomastia. FL MODIFIED BARIUM SWALLOW W VIDEO    Result Date: 12/10/2022  1. 1 instance of flash penetration without aspiration with the pureed/pudding thick substance. 2. No penetration or aspiration with the remainder of the administered substances/administrations. Please see separate speech pathology report for full discussion of findings and recommendations. All radiological studies reviewed    Code Status:  Full Code    Electronically signed by Johanny Leigh MD on 12/11/2022 at 5:10 PM     Copy sent to Dr. Sandy Alvarado DO    This note was created with the assistance of a speech-recognition program.  Although the intention is to generate a document that actually reflects the content of the visit, no guarantees can be provided that every mistake has been identified and corrected by editing. Note was updated later by me after  physical examination and  completion of the assessment.

## 2022-12-11 NOTE — PROGRESS NOTES
GI Progress notes    12/11/2022   1:09 PM    Name:  Sy Chavarria  MRN:    [id number]     Acct:     [de-identified]   Room:  [address] [place] [address] Day: 2     Admit Date: 12/9/2022  5:11 PM  PCP: Bette Tim DO    Subjective:     C/C:   Chief Complaint   Patient presents with    Abdominal Pain     Loss of appetite       Interval History: Status: not changed. Patient seen and examined  No acute events overnight  Reports some more generalized weakness  Could not get paracentesis yesterday. Wife reports 1 loose BM this am    ROS:  Constitutional: negative for chills, fevers and sweats  Gastrointestinal: negative for abdominal pain, constipation, diarrhea, nausea and vomiting  Neurological: negative for dizziness and headaches    Medications: Allergies:    Allergies   Allergen Reactions    Ciprofloxacin Other (See Comments)     Nerve pain exacerbation in feet    Codeine Other (See Comments)     insomia & tachycardia    Morphine      Pt states he has problems with his bp when given morphine    Pletal [Cilostazol]     Sulfamethoxazole-Trimethoprim     Pcn [Penicillins] Rash       Current Meds: dextrose 5 % and 0.45 % sodium chloride infusion, Continuous  potassium chloride 10 mEq/100 mL IVPB (Peripheral Line), PRN  amitriptyline (ELAVIL) tablet 25 mg, Nightly  clopidogrel (PLAVIX) tablet 75 mg, Daily  donepezil (ARICEPT) tablet 10 mg, BID  famotidine (PEPCID) tablet 20 mg, Daily  finasteride (PROSCAR) tablet 5 mg, Daily  magnesium oxide (MAG-OX) tablet 400 mg, Daily  melatonin tablet 9 mg, Nightly  nitroGLYCERIN (NITROSTAT) SL tablet 0.4 mg, Q5 Min PRN  pregabalin (LYRICA) capsule 100 mg, BID  rosuvastatin (CRESTOR) tablet 20 mg, Daily  sucralfate (CARAFATE) tablet 1 g, 4x Daily  enoxaparin (LOVENOX) injection 40 mg, Daily  sodium chloride flush 0.9 % injection 5-40 mL, 2 times per day  sodium chloride flush 0.9 % injection 5-40 mL, PRN  sodium chloride flush 0.9 % injection 5-40 mL, 2 times per day  sodium chloride flush 0.9 % injection 10 mL, PRN  0.9 % sodium chloride infusion, PRN  magnesium sulfate 1000 mg in dextrose 5% 100 mL IVPB, PRN  ondansetron (ZOFRAN-ODT) disintegrating tablet 4 mg, Q8H PRN   Or  ondansetron (ZOFRAN) injection 4 mg, Q6H PRN  polyethylene glycol (GLYCOLAX) packet 17 g, Daily PRN  acetaminophen (TYLENOL) tablet 650 mg, Q6H PRN   Or  acetaminophen (TYLENOL) suppository 650 mg, Q6H PRN        Data:     Code Status:  Full Code    No family history on file. Social History     Socioeconomic History    Marital status:      Spouse name: Not on file    Number of children: Not on file    Years of education: Not on file    Highest education level: Not on file   Occupational History    Not on file   Tobacco Use    Smoking status: Never    Smokeless tobacco: Never   Vaping Use    Vaping Use: Never used   Substance and Sexual Activity    Alcohol use: No    Drug use: No    Sexual activity: Not on file   Other Topics Concern    Not on file   Social History Narrative    Not on file     Social Determinants of Health     Financial Resource Strain: Not on file   Food Insecurity: Not on file   Transportation Needs: Not on file   Physical Activity: Not on file   Stress: Not on file   Social Connections: Not on file   Intimate Partner Violence: Not on file   Housing Stability: Not on file       Vitals:  /63   Pulse 84   Temp 98.2 °F (36.8 °C) (Oral)   Resp 16   Ht 5' 9\" (1.753 m)   Wt 145 lb (65.8 kg)   SpO2 91%   BMI 21.41 kg/m²   Temp (24hrs), Av.3 °F (36.8 °C), Min:97.9 °F (36.6 °C), Max:98.6 °F (37 °C)    No results for input(s): POCGLU in the last 72 hours. I/O (24Hr):   No intake or output data in the 24 hours ending 22 1309    Labs:      CBC:   Lab Results   Component Value Date/Time    WBC 10.8 2022 05:07 AM    RBC 4.06 2022 05:07 AM    HGB 11.3 2022 05:07 AM    HCT 36.9 2022 05:07 AM    MCV 90.9 2022 05:07 AM    [facility] 27.8 2022 05:07 AM    [facility] 30.6 12/11/2022 05:07 AM    RDW 13.5 12/11/2022 05:07 AM     12/11/2022 05:07 AM    MPV 10.9 12/11/2022 05:07 AM     CBC with Differential:    Lab Results   Component Value Date/Time    WBC 10.8 12/11/2022 05:07 AM    RBC 4.06 12/11/2022 05:07 AM    HGB 11.3 12/11/2022 05:07 AM    HCT 36.9 12/11/2022 05:07 AM     12/11/2022 05:07 AM    MCV 90.9 12/11/2022 05:07 AM    MCH 27.8 12/11/2022 05:07 AM    MCHC 30.6 12/11/2022 05:07 AM    RDW 13.5 12/11/2022 05:07 AM    LYMPHOPCT 10 12/11/2022 05:07 AM    MONOPCT 13 12/11/2022 05:07 AM    BASOPCT 0 12/11/2022 05:07 AM    MONOSABS 1.44 12/11/2022 05:07 AM    LYMPHSABS 1.03 12/11/2022 05:07 AM    EOSABS 0.06 12/11/2022 05:07 AM    BASOSABS 0.03 12/11/2022 05:07 AM    DIFFTYPE NOT REPORTED 03/03/2017 10:59 PM     Hemoglobin/Hematocrit:    Lab Results   Component Value Date/Time    HGB 11.3 12/11/2022 05:07 AM    HCT 36.9 12/11/2022 05:07 AM     CMP:    Lab Results   Component Value Date/Time     12/11/2022 05:07 AM    K 3.5 12/11/2022 05:07 AM     12/11/2022 05:07 AM    CO2 26 12/11/2022 05:07 AM    BUN 9 12/11/2022 05:07 AM    CREATININE 0.86 12/11/2022 05:07 AM    GFRAA >60 10/12/2021 07:50 AM    LABGLOM >60 12/11/2022 05:07 AM    GLUCOSE 128 12/11/2022 05:07 AM    PROT 5.5 12/11/2022 05:07 AM    LABALBU 2.6 12/11/2022 05:07 AM    CALCIUM 8.2 12/11/2022 05:07 AM    BILITOT 0.3 12/11/2022 05:07 AM    ALKPHOS 65 12/11/2022 05:07 AM    AST 22 12/11/2022 05:07 AM    ALT 10 12/11/2022 05:07 AM     BMP:    Lab Results   Component Value Date/Time     12/11/2022 05:07 AM    K 3.5 12/11/2022 05:07 AM     12/11/2022 05:07 AM    CO2 26 12/11/2022 05:07 AM    BUN 9 12/11/2022 05:07 AM    LABALBU 2.6 12/11/2022 05:07 AM    CREATININE 0.86 12/11/2022 05:07 AM    CALCIUM 8.2 12/11/2022 05:07 AM    GFRAA >60 10/12/2021 07:50 AM    LABGLOM >60 12/11/2022 05:07 AM    GLUCOSE 128 12/11/2022 05:07 AM     PT/INR:    Lab Results   Component Value Date/Time PROTIME 15.1 12/10/2022 05:23 AM    INR 1.2 12/10/2022 05:23 AM     PTT:    Lab Results   Component Value Date/Time    APTT 30.3 12/09/2022 09:10 PM   [APTT}    Physical Examination:        General appearance: alert, cooperative and no distress  Mental Status: oriented to person, place and time and normal affect  Abdomen: soft, nontender, nondistended, bowel sounds present   Extremities: no edema, redness or tenderness in the calves  Skin: no gross lesions, rashes, or induration    Assessment:        Primary Problem  Other ascites     Active Hospital Problems    Diagnosis Date Noted    Lower abdominal pain [R10.30] 12/10/2022     Priority: Medium    Diffuse abdominal pain [R10.84] 12/10/2022     Priority: Medium    Failure to thrive in adult [R62.7] 12/10/2022     Priority: Medium    Anorexia [R63.0] 12/10/2022     Priority: Medium    Other ascites [R18.8] 12/09/2022     Priority: Medium     Past Medical History:   Diagnosis Date    Bladder stone     CAD (coronary artery disease)     MI, Stents, CABG    Diverticulitis     GERD (gastroesophageal reflux disease)     Hyperlipidemia     Hypertension     Intermittent self-catheterization of bladder     LBBB (left bundle branch block) 3/4/2017    MI (myocardial infarction) (HCC)     Neuropathy     bilat toes    Polio     age 10    Wears glasses         Plan:        Abdominal pain, ? Hx of cirrhosis, ascites on imaging  Paracentesis with paracentesis panel tomorrow  Suspect SBP, consult ID for antibiotic recommendation, patient has allergy to penicillins, cephalosporins  Liver disease workup in progress  2 gm sodium diet  Dysphagia, malnutrition  Passed swallow study  Continue ST recommendations    Time spent reviewing the chart, seeing the patient, and discussing with the attending MD around 30 minutes.     Explained to the patient and d/W Nursing Staff  Will F/U with you  Please call or Page for any issues or change in status  Thanks    Electronically signed by Marlys Banegas Alejandro Schreer NP on 12/11/2022 at 1:09 PM

## 2022-12-11 NOTE — PROGRESS NOTES
Patient voided 200ml, post void residual bladder scan showed >300 ml. Verbal orders from Dr. Fletcher Dunne to place de la o.  Patient tolerated well, urine return noted

## 2022-12-11 NOTE — PLAN OF CARE
Patient was retaining urine, de la o catheter placed per Dr. Fletcher Dunne. ID consulted for ABX choice, IV rocephin ordered per Dr. Rivera Watson. Patient has an episode of nausea this morning, resolved with zofran x1. Family at bedside most of the day, concerned about patients nutrition, he is not eating well. Notified Dr. Fletcher Dunne of families concerns. Problem: Skin/Tissue Integrity  Goal: Absence of new skin breakdown  Description: 1. Monitor for areas of redness and/or skin breakdown  2. Assess vascular access sites hourly  3. Every 4-6 hours minimum:  Change oxygen saturation probe site  4. Every 4-6 hours:  If on nasal continuous positive airway pressure, respiratory therapy assess nares and determine need for appliance change or resting period.   12/11/2022 1640 by Quynh Salas RN  Outcome: Progressing     Problem: Safety - Adult  Goal: Free from fall injury  12/11/2022 1640 by Quynh Salas RN  Outcome: Progressing     Problem: Genitourinary - Adult  Goal: Urinary catheter remains patent  Outcome: Progressing

## 2022-12-12 ENCOUNTER — APPOINTMENT (OUTPATIENT)
Dept: INTERVENTIONAL RADIOLOGY/VASCULAR | Age: 82
DRG: 374 | End: 2022-12-12
Payer: MEDICARE

## 2022-12-12 PROBLEM — E43 SEVERE MALNUTRITION (HCC): Status: ACTIVE | Noted: 2022-12-12

## 2022-12-12 LAB
ABSOLUTE EOS #: 0.08 K/UL (ref 0–0.44)
ABSOLUTE IMMATURE GRANULOCYTE: 0.06 K/UL (ref 0–0.3)
ABSOLUTE LYMPH #: 1.1 K/UL (ref 1.1–3.7)
ABSOLUTE MONO #: 1.39 K/UL (ref 0.1–1.2)
ALBUMIN FLUID: 1.9 G/DL
ALBUMIN SERPL-MCNC: 2.5 G/DL (ref 3.5–5.2)
ALP BLD-CCNC: 71 U/L (ref 40–129)
ALT SERPL-CCNC: 12 U/L (ref 5–41)
ANION GAP SERPL CALCULATED.3IONS-SCNC: 8 MMOL/L (ref 9–17)
ANTI DNA DOUBLE STRANDED: 21 IU/ML
ANTI-NUCLEAR ANTIBODY (ANA): POSITIVE
AST SERPL-CCNC: 27 U/L
BASOPHILS # BLD: 0 % (ref 0–2)
BASOPHILS ABSOLUTE: 0.03 K/UL (ref 0–0.2)
BILIRUB SERPL-MCNC: 0.3 MG/DL (ref 0.3–1.2)
BUN BLDV-MCNC: 6 MG/DL (ref 8–23)
BUN/CREAT BLD: 8 (ref 9–20)
CALCIUM SERPL-MCNC: 8.1 MG/DL (ref 8.6–10.4)
CHLORIDE BLD-SCNC: 104 MMOL/L (ref 98–107)
CO2: 25 MMOL/L (ref 20–31)
CREAT SERPL-MCNC: 0.8 MG/DL (ref 0.7–1.2)
CREATININE FLUID: 0.7 MG/DL
CYTOMEGALOVIRUS IGG ANTIBODY: 21.6
EBV EARLY ANTIGEN IGG: 239 U/ML
EBV INTERPRETATION: ABNORMAL
EBV NUCLEAR AG AB: 509 U/ML
ENA ANTIBODIES SCREEN: 0.2 U/ML
EOSINOPHILS RELATIVE PERCENT: 1 % (ref 1–4)
EPSTEIN-BARR VCA IGG: 710 U/ML
EPSTEIN-BARR VCA IGM: 14 U/ML
GFR SERPL CREATININE-BSD FRML MDRD: >60 ML/MIN/1.73M2
GLUCOSE BLD-MCNC: 118 MG/DL (ref 70–99)
HCT VFR BLD CALC: 36 % (ref 40.7–50.3)
HEMOGLOBIN: 11.3 G/DL (ref 13–17)
IMMATURE GRANULOCYTES: 1 %
LIVER-KIDNEY MICROSOMAL AB: NORMAL
LYMPHOCYTES # BLD: 11 % (ref 24–43)
MCH RBC QN AUTO: 28.3 PG (ref 25.2–33.5)
MCHC RBC AUTO-ENTMCNC: 31.4 G/DL (ref 28.4–34.8)
MCV RBC AUTO: 90 FL (ref 82.6–102.9)
MITOCHONDRIAL ANTIBODY: 0.7 U/ML (ref 0–4)
MONOCYTES # BLD: 13 % (ref 3–12)
NRBC AUTOMATED: 0 PER 100 WBC
PDW BLD-RTO: 13.4 % (ref 11.8–14.4)
PLATELET # BLD: 323 K/UL (ref 138–453)
PMV BLD AUTO: 10.5 FL (ref 8.1–13.5)
POTASSIUM SERPL-SCNC: 3.8 MMOL/L (ref 3.7–5.3)
RBC # BLD: 4 M/UL (ref 4.21–5.77)
SEG NEUTROPHILS: 74 % (ref 36–65)
SEGMENTED NEUTROPHILS ABSOLUTE COUNT: 7.75 K/UL (ref 1.5–8.1)
SODIUM BLD-SCNC: 137 MMOL/L (ref 135–144)
SPECIMEN TYPE: NORMAL
SPECIMEN TYPE: NORMAL
TOTAL PROTEIN: 5.3 G/DL (ref 6.4–8.3)
WBC # BLD: 10.4 K/UL (ref 3.5–11.3)

## 2022-12-12 PROCEDURE — 49083 ABD PARACENTESIS W/IMAGING: CPT

## 2022-12-12 PROCEDURE — 87070 CULTURE OTHR SPECIMN AEROBIC: CPT

## 2022-12-12 PROCEDURE — C1729 CATH, DRAINAGE: HCPCS

## 2022-12-12 PROCEDURE — 88112 CYTOPATH CELL ENHANCE TECH: CPT

## 2022-12-12 PROCEDURE — 82150 ASSAY OF AMYLASE: CPT

## 2022-12-12 PROCEDURE — 85025 COMPLETE CBC W/AUTO DIFF WBC: CPT

## 2022-12-12 PROCEDURE — 87206 SMEAR FLUORESCENT/ACID STAI: CPT

## 2022-12-12 PROCEDURE — 87205 SMEAR GRAM STAIN: CPT

## 2022-12-12 PROCEDURE — 94761 N-INVAS EAR/PLS OXIMETRY MLT: CPT

## 2022-12-12 PROCEDURE — 87075 CULTR BACTERIA EXCEPT BLOOD: CPT

## 2022-12-12 PROCEDURE — 2580000003 HC RX 258: Performed by: FAMILY MEDICINE

## 2022-12-12 PROCEDURE — 84157 ASSAY OF PROTEIN OTHER: CPT

## 2022-12-12 PROCEDURE — 97535 SELF CARE MNGMENT TRAINING: CPT

## 2022-12-12 PROCEDURE — 88305 TISSUE EXAM BY PATHOLOGIST: CPT

## 2022-12-12 PROCEDURE — 0W9G3ZX DRAINAGE OF PERITONEAL CAVITY, PERCUTANEOUS APPROACH, DIAGNOSTIC: ICD-10-PCS | Performed by: RADIOLOGY

## 2022-12-12 PROCEDURE — 97530 THERAPEUTIC ACTIVITIES: CPT

## 2022-12-12 PROCEDURE — 6360000002 HC RX W HCPCS: Performed by: NURSE PRACTITIONER

## 2022-12-12 PROCEDURE — 2580000003 HC RX 258: Performed by: NURSE PRACTITIONER

## 2022-12-12 PROCEDURE — 82945 GLUCOSE OTHER FLUID: CPT

## 2022-12-12 PROCEDURE — 82042 OTHER SOURCE ALBUMIN QUAN EA: CPT

## 2022-12-12 PROCEDURE — 83615 LACTATE (LD) (LDH) ENZYME: CPT

## 2022-12-12 PROCEDURE — 6370000000 HC RX 637 (ALT 250 FOR IP): Performed by: FAMILY MEDICINE

## 2022-12-12 PROCEDURE — 36415 COLL VENOUS BLD VENIPUNCTURE: CPT

## 2022-12-12 PROCEDURE — 6360000002 HC RX W HCPCS: Performed by: INTERNAL MEDICINE

## 2022-12-12 PROCEDURE — 89051 BODY FLUID CELL COUNT: CPT

## 2022-12-12 PROCEDURE — APPSS30 APP SPLIT SHARED TIME 16-30 MINUTES: Performed by: NURSE PRACTITIONER

## 2022-12-12 PROCEDURE — 99254 IP/OBS CNSLTJ NEW/EST MOD 60: CPT | Performed by: INTERNAL MEDICINE

## 2022-12-12 PROCEDURE — 82570 ASSAY OF URINE CREATININE: CPT

## 2022-12-12 PROCEDURE — 2060000000 HC ICU INTERMEDIATE R&B

## 2022-12-12 PROCEDURE — 99233 SBSQ HOSP IP/OBS HIGH 50: CPT | Performed by: INTERNAL MEDICINE

## 2022-12-12 PROCEDURE — 2580000003 HC RX 258: Performed by: INTERNAL MEDICINE

## 2022-12-12 PROCEDURE — 80053 COMPREHEN METABOLIC PANEL: CPT

## 2022-12-12 PROCEDURE — 97112 NEUROMUSCULAR REEDUCATION: CPT

## 2022-12-12 RX ORDER — OMEPRAZOLE 40 MG/1
40 CAPSULE, DELAYED RELEASE ORAL DAILY
COMMUNITY

## 2022-12-12 RX ORDER — CEFDINIR 300 MG/1
300 CAPSULE ORAL 2 TIMES DAILY
Status: ON HOLD | COMMUNITY
End: 2022-12-20 | Stop reason: HOSPADM

## 2022-12-12 RX ORDER — FAMOTIDINE 20 MG/1
40 TABLET, FILM COATED ORAL 2 TIMES DAILY
Status: DISCONTINUED | OUTPATIENT
Start: 2022-12-12 | End: 2022-12-20 | Stop reason: HOSPADM

## 2022-12-12 RX ORDER — METRONIDAZOLE 500 MG/1
500 TABLET ORAL 2 TIMES DAILY
Status: ON HOLD | COMMUNITY
End: 2022-12-20 | Stop reason: HOSPADM

## 2022-12-12 RX ORDER — PANTOPRAZOLE SODIUM 40 MG/1
40 TABLET, DELAYED RELEASE ORAL
Status: DISCONTINUED | OUTPATIENT
Start: 2022-12-13 | End: 2022-12-20 | Stop reason: HOSPADM

## 2022-12-12 RX ORDER — ROSUVASTATIN CALCIUM 10 MG/1
10 TABLET, COATED ORAL DAILY
Status: DISCONTINUED | OUTPATIENT
Start: 2022-12-13 | End: 2022-12-20 | Stop reason: HOSPADM

## 2022-12-12 RX ORDER — DICYCLOMINE HYDROCHLORIDE 10 MG/1
10 CAPSULE ORAL 2 TIMES DAILY PRN
Status: ON HOLD | COMMUNITY
End: 2022-12-20 | Stop reason: HOSPADM

## 2022-12-12 RX ADMIN — CEFTRIAXONE SODIUM 1000 MG: 1 INJECTION, POWDER, FOR SOLUTION INTRAMUSCULAR; INTRAVENOUS at 20:16

## 2022-12-12 RX ADMIN — SUCRALFATE 1 G: 1 TABLET ORAL at 20:00

## 2022-12-12 RX ADMIN — SUCRALFATE 1 G: 1 TABLET ORAL at 09:29

## 2022-12-12 RX ADMIN — Medication 400 MG: at 09:29

## 2022-12-12 RX ADMIN — FAMOTIDINE 20 MG: 20 TABLET, FILM COATED ORAL at 09:29

## 2022-12-12 RX ADMIN — FAMOTIDINE 40 MG: 20 TABLET, FILM COATED ORAL at 20:00

## 2022-12-12 RX ADMIN — FINASTERIDE 5 MG: 5 TABLET, FILM COATED ORAL at 09:29

## 2022-12-12 RX ADMIN — CLOPIDOGREL BISULFATE 75 MG: 75 TABLET ORAL at 09:29

## 2022-12-12 RX ADMIN — DEXTROSE AND SODIUM CHLORIDE: 5; 450 INJECTION, SOLUTION INTRAVENOUS at 12:54

## 2022-12-12 RX ADMIN — ONDANSETRON 4 MG: 2 INJECTION INTRAMUSCULAR; INTRAVENOUS at 20:11

## 2022-12-12 RX ADMIN — DONEPEZIL HYDROCHLORIDE 10 MG: 10 TABLET, FILM COATED ORAL at 09:29

## 2022-12-12 RX ADMIN — Medication 9 MG: at 20:00

## 2022-12-12 RX ADMIN — AMITRIPTYLINE HYDROCHLORIDE 25 MG: 25 TABLET, FILM COATED ORAL at 20:00

## 2022-12-12 RX ADMIN — PREGABALIN 100 MG: 100 CAPSULE ORAL at 20:00

## 2022-12-12 RX ADMIN — DONEPEZIL HYDROCHLORIDE 10 MG: 10 TABLET, FILM COATED ORAL at 20:00

## 2022-12-12 RX ADMIN — ROSUVASTATIN CALCIUM 20 MG: 10 TABLET, FILM COATED ORAL at 09:29

## 2022-12-12 RX ADMIN — PREGABALIN 100 MG: 100 CAPSULE ORAL at 09:29

## 2022-12-12 RX ADMIN — SODIUM CHLORIDE, PRESERVATIVE FREE 10 ML: 5 INJECTION INTRAVENOUS at 20:17

## 2022-12-12 RX ADMIN — ONDANSETRON 4 MG: 2 INJECTION INTRAMUSCULAR; INTRAVENOUS at 12:52

## 2022-12-12 NOTE — PLAN OF CARE
Patient slept well during night, tolerated ATB well, fluids continue at 75 ml/hr. No nausea, vomiting or dry heaving tonight. Complaint of  lower left quadrant pain. Paracentesis to be done 12/12/2022, de la o draining nereida color urine. No s/s of new skin breakdown or injuries. Problem: Skin/Tissue Integrity  Goal: Absence of new skin breakdown  Description: 1. Monitor for areas of redness and/or skin breakdown  2. Assess vascular access sites hourly  3. Every 4-6 hours minimum:  Change oxygen saturation probe site  4. Every 4-6 hours:  If on nasal continuous positive airway pressure, respiratory therapy assess nares and determine need for appliance change or resting period.   12/12/2022 0411 by Rachel Doran RN  Outcome: Progressing     Problem: Safety - Adult  Goal: Free from fall injury  12/12/2022 0411 by Rachel Doran RN  Outcome: Progressing     Problem: Genitourinary - Adult  Goal: Absence of urinary retention  Outcome: Progressing

## 2022-12-12 NOTE — PLAN OF CARE
Patient had paracentesis today, 3L removed. Patient had been very fatigued and nauseous today, PRN zofran given. Urology consulted. Pt got up to chair and back to bed with adrianna steady. Family at bedside. Vital signs stable   Problem: Discharge Planning  Goal: Discharge to home or other facility with appropriate resources  12/12/2022 1720 by Kashmir Carrillo RN  Outcome: Progressing     Problem: Skin/Tissue Integrity  Goal: Absence of new skin breakdown  Description: 1. Monitor for areas of redness and/or skin breakdown  2. Assess vascular access sites hourly  3. Every 4-6 hours minimum:  Change oxygen saturation probe site  4. Every 4-6 hours:  If on nasal continuous positive airway pressure, respiratory therapy assess nares and determine need for appliance change or resting period.   12/12/2022 1720 by Kashmir Carrillo RN  Outcome: Progressing     Problem: Safety - Adult  Goal: Free from fall injury  12/12/2022 1720 by Kashmir Carrillo RN  Outcome: Progressing     Problem: Genitourinary - Adult  Goal: Urinary catheter remains patent  12/12/2022 1720 by Kashmir Carrillo RN  Outcome: Progressing

## 2022-12-12 NOTE — FLOWSHEET NOTE
Dr. Kalee Curry performs paracentesis;3.1 Liters of nereida ascitic fluid drained & specimen sent to lab per MD ordered testing. Patient tolerated procedure well; 2x2 gauze & transparent dressing applied to procedure site that is clean dry & intact.

## 2022-12-12 NOTE — CARE COORDINATION
Discharge planning    Patient chart reviewed. Appreciate prior CM/SS notes. Per ss if patient wants snf they have referral to [facility] if home care will have to find home care agency that services [facility]. He is medicare and CM did send referral to [facility] per notes. Did send per epic today. He has walker, w/c and shower bench. Lives with wife and daughter. Patient admitted with abdominal pain. Paracentesis ordered for today. Suspect SBP and ID has been consulted. Therapy has seen and recommending snf.

## 2022-12-12 NOTE — PROGRESS NOTES
Comprehensive Nutrition Assessment    Type and Reason for Visit:  Consult, Patient Education (what to eat following discharge)    Nutrition Recommendations/Plan:   Continue ADULT DIET; Easy to Chew; Low Sodium (2 gm)  Start Ensure Clear 3x/day  Monitor p.o intakes, diet tolerance, and labs     Malnutrition Assessment:  Malnutrition Status:  Severe malnutrition (12/12/22 0144)    Context:  Chronic Illness     Findings of the 6 clinical characteristics of malnutrition:  Energy Intake:  75% or less estimated energy requirements for 1 month or longer  Weight Loss:  Unable to assess     Body Fat Loss:  Severe body fat loss Triceps, Orbital   Muscle Mass Loss:  Severe muscle mass loss Temples (temporalis), Clavicles (pectoralis & deltoids)  Fluid Accumulation:  Severe Ascites   Strength:  Not Performed    Nutrition Assessment:    Patient admission is related ascites and failure to thrive. Patient's family reports he has not been taking more than a few bites of food for a month due to early satiety and has not been drinking ONS due to thick consistancy. Patient is status post modified barium study (12/10) which recommended a double swallow technique. Patient was not able to eat breakfast this morning due to abdominal pain and fullness. Patient had a paracentesis this afternoon and 3.1 liters of fluid was removed. Recommendation for Ensure Clear 3x/day and soft foods given to family and patient. Family inquired about tube feedings or TPN. Due to weight loss patient has severe muscle and fat mass loss and is severely malnourished. Will monitor to see if oral intakes improved after paracentesis before considering nutrition support. Nutrition Related Findings:    Edema: trace BLE. Bowel sounds: distant RUQ, LUQ RLQ, active LLQ. Nausea and belching. Ascites. Paracentesis 3.1 L removed (12/12).  MBSS (12/10) Wound Type: None       Current Nutrition Intake & Therapies:    Average Meal Intake: 1-25%     ADULT DIET; Easy to Chew; Low Sodium (2 gm)  ADULT ORAL NUTRITION SUPPLEMENT; Breakfast, Dinner, Lunch; Clear Liquid Oral Supplement    Anthropometric Measures:  Height: 5' 9\" (175.3 cm)  Ideal Body Weight (IBW): 160 lbs (73 kg)       Current Body Weight: 145 lb (65.8 kg), 90.6 % IBW.     Current BMI (kg/m2): 21.4                               Estimated Daily Nutrient Needs:  Energy Requirements Based On: Kcal/kg  Weight Used for Energy Requirements: Current  Energy (kcal/day): 1975 kcal (30 mL/hr)  Weight Used for Protein Requirements: Current  Protein (g/day): 86-99 gm of protein (1.3-1.5 gm/kg)     Fluid (ml/day):      Nutrition Diagnosis:   Severe malnutrition related to inadequate protein-energy intake as evidenced by intake 26-50%, intake 0-25%, weight loss, severe muscle loss, severe loss of subcutaneous fat  Altered GI function related to altered GI function as evidenced by GI abnormality, localized or generalized fluid accumulation (ascites)    Nutrition Interventions:   Food and/or Nutrient Delivery: Continue Current Diet, Start Oral Nutrition Supplement  Nutrition Education/Counseling: Education not indicated  Coordination of Nutrition Care: Continue to monitor while inpatient       Goals:     Goals: PO intake 50% or greater       Nutrition Monitoring and Evaluation:      Food/Nutrient Intake Outcomes: Food and Nutrient Intake, Supplement Intake  Physical Signs/Symptoms Outcomes: Biochemical Data, Fluid Status or Edema, Weight, Skin, GI Status    Discharge Planning:    Continue current diet, Continue Oral Nutrition Supplement           Johnathan PERAZAN, RDN, LDN  Lead Clinical Dietitian  RD Office Phone [phone]

## 2022-12-12 NOTE — PROGRESS NOTES
[address] [facility]        12/12/2022   3:19 PM    Name:  Sukumar Castro  MRN:    [id number]     Acct:     [de-identified]   Room:  0/18-0  IP Day: 3     Admit Date: 12/9/2022  5:11 PM  PCP: Arelis Lou DO    C/C:   Chief Complaint   Patient presents with    Abdominal Pain     Loss of appetite       Assessment:      Lower abdominal pain  Suspected spontaneous bacterial peritonitis  Questionable history of liver cirrhosis  Ascites  Failure to thrive  Diverticulosis  Coronary artery disease, native vessel  Status post CABG  Left bundle branch block  Benign prostatic hypertrophy  Mixed hyperlipidemia  Major depressive disorder  Dementia  Gastroesophageal reflux disease without esophagitis  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  Polio at age 10        Plan:      Patient is admitted to progressive unit  Oxygen to keep SPO2 more than 90%  Telemetry  Check vital signs closely  CBC BMP daily  Blood culture  Gastroenterology is involved, they are suspecting spontaneous bacterial peritonitis, patient is continued on IV antibiotics IV Rocephin, further they will follow-up fluid cultures from ascitic fluid  Consult infectious disease  Urology consult  Continue Plavix  Continue Crestor  Continue other medication as below    Scheduled Meds:   cefTRIAXone (ROCEPHIN) IV  1,000 mg IntraVENous Q24H    amitriptyline  25 mg Oral Nightly    clopidogrel  75 mg Oral Daily    donepezil  10 mg Oral BID    famotidine  20 mg Oral Daily    finasteride  5 mg Oral Daily    magnesium oxide  400 mg Oral Daily    melatonin  9 mg Oral Nightly    pregabalin  100 mg Oral BID    rosuvastatin  20 mg Oral Daily    sucralfate  1 g Oral 4x Daily    enoxaparin  40 mg SubCUTAneous Daily    sodium chloride flush  5-40 mL IntraVENous 2 times per day    sodium chloride flush  5-40 mL IntraVENous 2 times per day     Continuous Infusions:   dextrose 5 % and 0.45 % NaCl 75 mL/hr at 12/12/22 1254    sodium chloride 75 mL/hr at 12/11/22      PRN Meds:  potassium chloride, 10 mEq, PRN  nitroGLYCERIN, 0.4 mg, Q5 Min PRN  sodium chloride flush, 5-40 mL, PRN  sodium chloride flush, 10 mL, PRN  sodium chloride, , PRN  magnesium sulfate, 1,000 mg, PRN  ondansetron, 4 mg, Q8H PRN   Or  ondansetron, 4 mg, Q6H PRN  polyethylene glycol, 17 g, Daily PRN  acetaminophen, 650 mg, Q6H PRN   Or  acetaminophen, 650 mg, Q6H PRN            Subjective:     Patient seen and examined at bedside. No overnight events. No acute complaints today. Afebrile  Pt. Denies any CP, SOB, palpitation, HA, dizziness, chills, cough, cold, changes in urination, BM or skin changes or any pain. ROS:  A 10 point system reviewed and negative otherwise mentioned above. Physical Examination:      Vitals:  /79   Pulse 77   Temp 97.9 °F (36.6 °C)   Resp 16   Ht 5' 9\" (1.753 m)   Wt 145 lb (65.8 kg)   SpO2 93%   BMI 21.41 kg/m²   Temp (24hrs), Av.3 °F (36.8 °C), Min:97.5 °F (36.4 °C), Max:99.3 °F (37.4 °C)    Weight:   Wt Readings from Last 3 Encounters:   22 145 lb (65.8 kg)   10/11/21 155 lb (70.3 kg)   20 150 lb (68 kg)     I/O last 3 completed shifts:  I/O last 3 completed shifts:  In: -   Out: 550 [Urine:550]     No results for input(s): POCGLU in the last 72 hours.       General appearance - alert, well appearing, and in no acute distress  Mental status - oriented to person, place, and time with normal affect  Head - normocephalic and atraumatic  Eyes - pupils equal and reactive, extraocular eye movements intact, conjunctiva clear  Ears - hearing appears to be intact  Nose - no drainage noted  Mouth - mucous membranes moist  Neck - supple, no carotid bruits, thyroid not palpable  Chest - clear to auscultation, normal effort  Heart - normal rate, regular rhythm, no murmur  Abdomen - soft, diffuse abdominal tenderness, distended, bowel sounds present all four quadrants, no masses, hepatomegaly or splenomegaly  Neurological - normal speech, no focal findings or movement disorder noted, cranial nerves II through XII grossly intact  Extremities - peripheral pulses palpable, no pedal edema or calf pain with palpation  Skin - no gross lesions, rashes, or induration noted        Medications: Allergies:    Allergies   Allergen Reactions    Ciprofloxacin Other (See Comments)     Nerve pain exacerbation in feet    Codeine Other (See Comments)     insomia & tachycardia    Morphine      Pt states he has problems with his bp when given morphine    Pletal [Cilostazol]     Sulfamethoxazole-Trimethoprim     Pcn [Penicillins] Rash       Current Meds:   Current Facility-Administered Medications:     cefTRIAXone (ROCEPHIN) 1,000 mg in dextrose 5 % 50 mL IVPB mini-bag, 1,000 mg, IntraVENous, Q24H, Corky Amanda MD, Stopped at 12/11/22 2320    dextrose 5 % and 0.45 % sodium chloride infusion, , IntraVENous, Continuous, Abdirizak Bonilla MD, Last Rate: 75 mL/hr at 12/12/22 1254, New Bag at 12/12/22 1254    potassium chloride 10 mEq/100 mL IVPB (Peripheral Line), 10 mEq, IntraVENous, PRN, Abdirizak Bonilla MD, Last Rate: 100 mL/hr at 12/11/22 1127, 10 mEq at 12/11/22 1127    amitriptyline (ELAVIL) tablet 25 mg, 25 mg, Oral, Nightly, Abdirizak Bonilla MD, 25 mg at 12/11/22 1950    clopidogrel (PLAVIX) tablet 75 mg, 75 mg, Oral, Daily, Abdirizak Bonilla MD, 75 mg at 12/12/22 0929    donepezil (ARICEPT) tablet 10 mg, 10 mg, Oral, BID, Abdirizak Bonilla MD, 10 mg at 12/12/22 0929    famotidine (PEPCID) tablet 20 mg, 20 mg, Oral, Daily, Abdirizak Bonilla MD, 20 mg at 12/12/22 0929    finasteride (PROSCAR) tablet 5 mg, 5 mg, Oral, Daily, Abdirizak Bonilla MD, 5 mg at 12/12/22 0929    magnesium oxide (MAG-OX) tablet 400 mg, 400 mg, Oral, Daily, Abdirizak Bonilla MD, 400 mg at 12/12/22 0929    melatonin tablet 9 mg, 9 mg, Oral, Nightly, Abdirizak Bonilla MD, 9 mg at 12/11/22 1950    nitroGLYCERIN (NITROSTAT) SL tablet 0.4 mg, 0.4 mg, SubLINGual, Q5 Min PRN, Abdirizak Bonilla MD    pregabalin (LYRICA) capsule 100 mg, 100 mg, Oral, BID, Abdirizak Bonilla MD, 100 mg at 12/12/22 0929    rosuvastatin (CRESTOR) tablet 20 mg, 20 mg, Oral, Daily, Abdirizak Bonilla MD, 20 mg at 12/12/22 0929    sucralfate (CARAFATE) tablet 1 g, 1 g, Oral, 4x Daily, Abdirizak Bonilla MD, 1 g at 12/12/22 0929    enoxaparin (LOVENOX) injection 40 mg, 40 mg, SubCUTAneous, Daily, Abdirizak Bonilla MD, 40 mg at 12/11/22 0853    sodium chloride flush 0.9 % injection 5-40 mL, 5-40 mL, IntraVENous, 2 times per day, Mayelin Campa MD    sodium chloride flush 0.9 % injection 5-40 mL, 5-40 mL, IntraVENous, PRN, Mayelin Campa MD    sodium chloride flush 0.9 % injection 5-40 mL, 5-40 mL, IntraVENous, 2 times per day, Cary A Lump, APRN - CNP, 10 mL at 12/11/22 0854    sodium chloride flush 0.9 % injection 10 mL, 10 mL, IntraVENous, PRN, Cary A Lump, APRN - CNP    0.9 % sodium chloride infusion, , IntraVENous, PRN, Sofia Fisher Lump, APRN - CNP, Last Rate: 75 mL/hr at 12/11/22 1959, New Bag at 12/11/22 1959    magnesium sulfate 1000 mg in dextrose 5% 100 mL IVPB, 1,000 mg, IntraVENous, PRN, Cary A Lump, APRN - CNP    ondansetron (ZOFRAN-ODT) disintegrating tablet 4 mg, 4 mg, Oral, Q8H PRN **OR** ondansetron (ZOFRAN) injection 4 mg, 4 mg, IntraVENous, Q6H PRN, Cary A Lump, APRN - CNP, 4 mg at 12/12/22 1252    polyethylene glycol (GLYCOLAX) packet 17 g, 17 g, Oral, Daily PRN, Cary A Lump, APRN - CNP    acetaminophen (TYLENOL) tablet 650 mg, 650 mg, Oral, Q6H PRN **OR** acetaminophen (TYLENOL) suppository 650 mg, 650 mg, Rectal, Q6H PRN, Cary Rebollar, APRN - CNP      I/O (24Hr):     Intake/Output Summary (Last 24 hours) at 12/12/2022 1519  Last data filed at 12/12/2022 1418  Gross per 24 hour   Intake --   Output 3950 ml   Net -3950 ml       Data:           Labs:    Hematology:  Recent Labs     12/09/22  2110 12/10/22  0523 12/10/22  0610 12/11/22  0507 12/12/22  0511   WBC  --   --  10.4 10.8 10.4   RBC  --   --  4.18* 4.06* 4.00*   HGB  --   --  11.7* 11.3* 11.3*   HCT  --   --  38.8* 36.9* 36.0*   MCV  --   --  92.8 90.9 90.0   MCH  --   --  28.0 27.8 28.3   MCHC  --   --  30.2 30.6 31.4   RDW  --   --  13.5 13.5 13.4   PLT  --   --  298 325 323   MPV  --   --  10.7 10.9 10.5   INR 1.2 1.2  --   --   --      Chemistry:  Recent Labs     12/10/22  0610 12/11/22  0507 12/12/22  0511    138 137   K 3.3* 3.5* 3.8    103 104   CO2 25 26 25   GLUCOSE 97 128* 118*   BUN 10 9 6*   CREATININE 0.75 0.86 0.80   MG 1.9 1.9  --    ANIONGAP 11 9 8*   LABGLOM >60 >60 >60   CALCIUM 8.1* 8.2* 8.1*     Recent Labs     12/09/22  2110 12/10/22  0610 12/10/22  1020 12/11/22  0507 12/12/22  0511   PROT  --  5.7*  --  5.5* 5.3*   LABALBU  --  2.7*  --  2.6* 2.5*   AST  --  20  --  22 27   ALT  --  11  --  10 12   ALKPHOS  --  66  --  65 71   BILITOT  --  0.3  --  0.3 0.3   AMMONIA  --   --  26  --   --    LIPASE 29  --   --   --   --        Lab Results   Component Value Date/Time    SPECIAL NOT REPORTED 02/18/2020 10:58 PM     Lab Results   Component Value Date/Time    CULTURE ENTEROCOCCUS FAECALIS >553165 CFU/ML (A) 02/18/2020 10:58 PM       No results found for: POCPH, PHART, PH, POCPCO2, NCH5KHA, PCO2, POCPO2, PO2ART, PO2, POCHCO3, HGO9OKO, HCO3, NBEA, PBEA, BEART, BE, THGBART, THB, QFM7TOD, UWTQ8XEW, L8PILRUF, O2SAT, FIO2    Radiology:    CT ABDOMEN PELVIS W IV CONTRAST Additional Contrast? None    Result Date: 12/9/2022  Moderate ascites with associated moderate diffuse edema within the mesentery. Severe diverticulosis involving the left colon without evidence of acute diverticulitis. No abscess or perforation. Numerous bladder diverticula. Laminectomy at L4 and L5. Bilateral gynecomastia. FL MODIFIED BARIUM SWALLOW W VIDEO    Result Date: 12/10/2022  1. 1 instance of flash penetration without aspiration with the pureed/pudding thick substance. 2. No penetration or aspiration with the remainder of the administered substances/administrations.  Please see separate speech pathology report for full discussion of findings and recommendations. All radiological studies reviewed  Code Status:  Full Code        Electronically signed by Adeel Kay MD on 12/12/2022 at 3:19 PM    This note was created with the assistance of a speech-recognition program.  Although the intention is to generate a document that actually reflects the content of the visit, no guarantees can be provided that every mistake has been identified and corrected by editing. Note was updated later by me after  physical examination and  completion of the assessment.

## 2022-12-12 NOTE — CONSULTS
[facility]  Initial Consult Note  Date: 12/12/2022    Hospital day :3     Impression:   Ascites of unknown origin with workup to R/O Spontaneous Bacterial Peritonitis  No abnormalities of liver noted on CT abdomen 12/9 or of LFTs   History of diverticulitis of the bladder and sigmoid colon without perforations or surgical correction  CT abdomen 12/9 does not show active diverticulitis  Gastroesophageal Reflux Disease without Esophagitis  Alzheimer's  Failure to thrive  CAD s/ CABG  Benign Prostate Hyperplasia    Recommendations   Patient does not appear systemically ill and other than some abdominal tenderness, the overall clinical picture does not suggest SBP the given the history of progressive abdominal distention over the last 2 months. Paracentesis planned for 12/12/22; will follow the fluid analysis labs for origin of ascites  I will continue empiric antimicrobial therapy with IV Rocephin at this time pending paracentesis results  I will also add fluid creatinine to ensure no bladder leakage of urine into the abdominal cavity  GI is following- EGD and colonoscopy planned for outpatient evaluation   Patient and family had questions regarding the etiology of his distension- informed them that without a paracentesis, we cannot definitively say what caused this. Family voiced understanding    Chief complaint/reason for consultation:   Suspected Spontaneous Bacterial Peritonitis     History of Present Illness:   Ruchi Limon is a 81y.o.-year-old male who was initially admitted on 12/9/2022 for diffuse abdominal pain with distension. History was mainly provided by the wife and daughter who were at bedside. Abdominal problems began October where patient was only experiencing pain. CT abdomen imaging revealed diverticulitis of the bladder; treated by Dr. Luc Garay who performed a cystoscopy to \"drain all the pus\" and then with Levaquin.  Patient tolerated the medication for 5 days before worsening of neuropathy to the point where the patient was unable to walk- Levaquin discontinued. After cystoscopy, patient developed GERD, diarrhea 2-3x per day that was watery and rust colored, and further worsening abdominal pain requiring another ED visit to [facility] [address] 11[address]8 where CT abdomen revealed sigmoid colon diverticulitis treated with an antibiotic, but family could not recall name. No surgery was needed at time. However with treatment, symptoms worsened and by 11/28, patient had to be hospitalized due to weakness and continued diverticulitis that required Flagyl. Patient presented to [facility]. Yudy[facility] after worsening weight loss, decreased appetite, abdominal pain, increased distension, and recurrent vomiting productive of yellow mucus. CT abdomen showed ascites without diverticulitis, perforations, or abscess. Liver function test without abnormalities. No leukocytosis. PMHx significant for CAD s/p CABG, BPH, hyperlipidemia, Alzheimer's, GERD, nephrolithiasis, and bilateral lower extremity neuropathy. Last colonoscopy was 2017 without abnormal findings per family. Today, patient denies fevers, chills, SOB, cough, dysuria, hematuria, hematochezia, and melena. Positive for abdominal pain, nausea without vomiting, diarrhea, GERD, distension, and abdominal pain. I have personally reviewed the past medical history, past surgical history, medications, social history, and family history, and I have updated the database accordingly.   Past Medical History:     Past Medical History:   Diagnosis Date    Bladder stone     CAD (coronary artery disease)     MI, Stents, CABG    Diverticulitis     GERD (gastroesophageal reflux disease)     Hyperlipidemia     Hypertension     Intermittent self-catheterization of bladder     LBBB (left bundle branch block) 3/4/2017    MI (myocardial infarction) (HCC)     Neuropathy     bilat toes    Polio     age 10    Wears glasses      Past Surgical  History:     Past Surgical History: Procedure Laterality Date    BLADDER STONE REMOVAL      CARDIAC SURGERY  2000    CABG    COLONOSCOPY      CORONARY ANGIOPLASTY WITH STENT PLACEMENT      4 stents    CORONARY ARTERY BYPASS GRAFT  2000    triple vessel    ENDOSCOPY, COLON, DIAGNOSTIC       Medications:      cefTRIAXone (ROCEPHIN) IV  1,000 mg IntraVENous Q24H    amitriptyline  25 mg Oral Nightly    clopidogrel  75 mg Oral Daily    donepezil  10 mg Oral BID    famotidine  20 mg Oral Daily    finasteride  5 mg Oral Daily    magnesium oxide  400 mg Oral Daily    melatonin  9 mg Oral Nightly    pregabalin  100 mg Oral BID    rosuvastatin  20 mg Oral Daily    sucralfate  1 g Oral 4x Daily    enoxaparin  40 mg SubCUTAneous Daily    sodium chloride flush  5-40 mL IntraVENous 2 times per day    sodium chloride flush  5-40 mL IntraVENous 2 times per day     Social History:     Social History     Socioeconomic History    Marital status:      Spouse name: Not on file    Number of children: Not on file    Years of education: Not on file    Highest education level: Not on file   Occupational History    Not on file   Tobacco Use    Smoking status: Never    Smokeless tobacco: Never   Vaping Use    Vaping Use: Never used   Substance and Sexual Activity    Alcohol use: No    Drug use: No    Sexual activity: Not on file   Other Topics Concern    Not on file   Social History Narrative    Not on file     Social Determinants of Health     Financial Resource Strain: Not on file   Food Insecurity: Not on file   Transportation Needs: Not on file   Physical Activity: Not on file   Stress: Not on file   Social Connections: Not on file   Intimate Partner Violence: Not on file   Housing Stability: Not on file     Family History:   No family history on file. Allergies:   Ciprofloxacin, Codeine, Morphine, Pletal [cilostazol], Sulfamethoxazole-trimethoprim, and Pcn [penicillins]     Review of Systems:     General: No fevers, chills, or sweating.  Positive for decreased appetite and fatigue  Eyes: No double vision or blurry vision. ENT: No sore throat, congestion, or nasal congestion    Cardiovascular: No chest pain, palpitations, or leg swelling  Lung: No shortness of breath, cough, dyspnea, or chest tightness  Abdomen: Denies abdominal pain or tenesmus. Positive for distension, GERD, nausea, vomiting productive of yellow mucus,  diarrhea 2-3 times a day that is watery and \"rust colored,\"  Genitourinary: No increased urinary frequency, or dysuria. Musculoskeletal: Negative for arthralgia. Positive for bilateral lower extremity pain and neuropathy  Hematologic: No bleeding or bruising. Neurologic: No headache or weakness. Positive for numbness and tingling of bilateral lower extremities    Physical Examination :   /75   Pulse 88   Temp 97.5 °F (36.4 °C) (Oral)   Resp 16   Ht 5' 9\" (1.753 m)   Wt 145 lb (65.8 kg)   SpO2 92%   BMI 21.41 kg/m²     Temperature Range: Temp: 97.5 °F (36.4 °C) Temp  Av.3 °F (36.8 °C)  Min: 97.5 °F (36.4 °C)  Max: 99.3 °F (37.4 °C)    General Appearance: alert and oriented to person, place and time, well-developed and well-nourished, in no acute distress  Head: Normocephalic, without obvious abnormality, atraumatic  Eyes: Pupils equal, round, reactive, to light and accommodation; extraocular movements intact; sclera anicteric; conjunctivae pink  ENT: Oropharynx clear, without erythema, exudate, or thrush. Neck: neck supple and non tender without mass and no cervical adenopathy   Pulmonary/Chest: No bony abnormalities. I>E. No rales or rhonchi but positive for right lower lobe expiratory wheezes without radiation. No respiratory distress, ICS retractions, or nasal flaring. Normal air movement  Cardiovascular: Normal rate and rhythm. Normal S1 and S2 without murmurs, rubs, clicks or gallops. Distal pulses 2+ and intact bilaterally. Abdomen: Symmetrically distended. Soft with normal bowel sounds.  Tenderness of LLQ and RLQ but not in other quadrants to palpation and with rebound. Dullness to percussion throughout. No masses or organomegaly. Extremities: No cyanosis, clubbing, edema, or effusions. Neurologic: No gross sensory or motor deficits. 5/5 muscle strength of lower extremities  Skin: warm and dry, no rash or erythema    Medical Decision Making:   I have independently reviewed/ordered the following labs:  CBC with Differential:   Recent Labs     12/11/22  0507 12/12/22  0511   WBC 10.8 10.4   HGB 11.3* 11.3*   HCT 36.9* 36.0*    323   LYMPHOPCT 10* 11*   MONOPCT 13* 13*     BMP:   Recent Labs     12/10/22  0610 12/11/22  0507 12/12/22  0511    138 137   K 3.3* 3.5* 3.8    103 104   CO2 25 26 25   BUN 10 9 6*   CREATININE 0.75 0.86 0.80   MG 1.9 1.9  --      Hepatic Function Panel:   Recent Labs     12/11/22  0507 12/12/22  0511   PROT 5.5* 5.3*   LABALBU 2.6* 2.5*   BILITOT 0.3 0.3   ALKPHOS 65 71   ALT 10 12   AST 22 27       No results found for: PROCAL    No results found for: CRP  Lab Results   Component Value Date/Time    FERRITIN 97 12/10/2022 10:20 AM     No results found for: FIBRINOGEN  No results found for: DDIMER  No results found for: LDH    No results found for: SEDRATE    Lab Results   Component Value Date/Time    COVID19 Not Detected 12/09/2022 08:14 PM     No results found for requested labs within last 30 days. Imaging Studies:   CT ABDOMEN PELVIS W IV CONTRAST Additional Contrast? None    Result Date: 12/9/2022  EXAMINATION: CT OF THE ABDOMEN AND PELVIS WITH CONTRAST 12/9/2022 6:23 pm TECHNIQUE: CT of the abdomen and pelvis was performed with the administration of intravenous contrast. Multiplanar reformatted images are provided for review. Automated exposure control, iterative reconstruction, and/or weight based adjustment of the mA/kV was utilized to reduce the radiation dose to as low as reasonably achievable.  COMPARISON: None HISTORY: ORDERING SYSTEM PROVIDED HISTORY: Abdominal pain, recent dx of Diverticulitis TECHNOLOGIST PROVIDED HISTORY: Abdominal pain, recent dx of Diverticulitis Decision Support Exception - unselect if not a suspected or confirmed emergency medical condition->Emergency Medical Condition (MA) Reason for Exam: Abdominal pain, n/v, recent dx of diverticulitis. FINDINGS: Lower Chest: The lung bases are clear. Bilateral gynecomastia is noted. Organs: The liver, gallbladder, spleen, pancreas and adrenal glands are intact. The kidneys reveal no evidence for hydronephrosis or renal mass. There is a large cyst within the lower pole of the left kidney. No renal calculi are visualized. GI/Bowel: The bowel is normal in caliber. No free air is appreciated. There is reticulation of the mesentery diffusely likely representing edema. Moderate ascites is appreciated primary involving the right and left paracolic gutters. Severe left colon diverticulosis is identified without evidence of acute diverticulitis. Pelvis: Numerous bladder diverticula are identified. No bladder calculus is seen. Peritoneum/Retroperitoneum: No lymphadenopathy is detected. Mild aortic atherosclerosis is appreciated. Bones/Soft Tissues: Osteopenia is identified. Laminectomy is identified at L4 and L5. Moderate ascites with associated moderate diffuse edema within the mesentery. Severe diverticulosis involving the left colon without evidence of acute diverticulitis. No abscess or perforation. Numerous bladder diverticula. Laminectomy at L4 and L5. Bilateral gynecomastia. FL MODIFIED BARIUM SWALLOW W VIDEO    Result Date: 12/10/2022  EXAMINATION: MODIFIED BARIUM SWALLOW WAS PERFORMED IN CONJUNCTION WITH SPEECH PATHOLOGY SERVICES TECHNIQUE: Under fluoroscopic evaluation cineradiography/videoradiography recordings were performed in conjunction with the speech-language pathologist (SLP). Various liquid, solid and/or semi-solid barium preparations were used to assess swallowing function.  FLUOROSCOPY DOSE AND TYPE OR TIME AND EXPOSURES: Fluoro time: 2.6 minutes Dose: 0.947 Gy cm2 COMPARISON: None HISTORY: ORDERING SYSTEM PROVIDED HISTORY: dysphagia with liquids TECHNOLOGIST PROVIDED HISTORY: dysphagia with liquids Reason for Exam: ro aspirtation 27-year-old male with dysphagia with liquids; rule out aspiration FINDINGS: No penetration or aspiration with the thin liquid substance and thick liquid substance by straw, soft solid, or cookie solid substances. 1 instance of flash penetration without aspiration with the pureed/pudding thick substance. 1. 1 instance of flash penetration without aspiration with the pureed/pudding thick substance. 2. No penetration or aspiration with the remainder of the administered substances/administrations. Please see separate speech pathology report for full discussion of findings and recommendations. Cultures:     COVID-19, Rapid [[id number]] Collected: 12/09/22 2014   Order Status: Completed Specimen: Nasopharyngeal Swab Updated: 12/09/22 2102    Specimen Description . NASOPHARYNGEAL SWAB    SARS-CoV-2, Rapid Not Detected    Comment:        Rapid NAAT:  The specimen is NEGATIVE for SARS-CoV-2, the novel coronavirus associated with   COVID-19. The ID NOW COVID-19 assay is designed to detect the virus that causes COVID-19 in patients   with signs and symptoms of infection who are suspected of COVID-19. An individual without symptoms of COVID-19 and who is not shedding SARS-CoV-2 virus would   expect to have a negative (not detected) result in this assay. Negative results should be treated as presumptive and, if inconsistent with clinical signs   and symptoms or necessary for patient management,   should be tested with an alternative molecular assay. Negative results do not preclude   SARS-CoV-2 infection and   should not be used as the sole basis for patient management decisions.          Fact sheet for Healthcare Providers: http://www.emy.lesly/   Fact sheet for Patients: payByMobile.co.za         Methodology: Isothermal Nucleic Acid Amplification       Rapid influenza A/B antigens [[id number]] Collected: 12/09/22 2014   Order Status: Completed Specimen: Nasopharyngeal Updated: 12/09/22 2043    Flu A Antigen NEGATIVE    Comment: for Influenza A Antigen       Flu B Antigen NEGATIVE    Comment: for Influenza B Antigen. Thank you for allowing us to participate in the care of this patient. Please call with questions. Electronically signed by Leny Barrow on 12/12/2022 at 8:03 AM      [facility] [address] [place] [address] messaging  OFFICE: [phone]    I have discussed the care of [address] [place] [address] including pertinent history and exam findings,  with the student. I have seen and examined the patient and the key elements of all parts of the encounter have been performed by me. I agree with the assessment, plan and orders as documented by the student. Electronically signed by Roseanna Daigle MD on 12/12/2022 at 7:38 PM  [employer] messaging [phone]      This note is created with the assistance of a speech recognition program.  While intending to generate a document that actually reflects the content of the visit, the document can still have some errors including those of syntax and sound a like substitutions which may escape proof reading. In such instances, actual meaning can be extrapolated by contextual diversion.

## 2022-12-12 NOTE — PROGRESS NOTES
Physical Therapy  Facility/Department: [facility] PROGRESSIVE CARE  Daily Treatment Note  NAME: Desirae Carbajal  : 1940  MRN: [id number]    Date of Service: 2022    Discharge Recommendations:  Patient would benefit from continued therapy after discharge      Pt currently functioning below baseline. Recommend daily inpatient skilled therapy at time of discharge to maximize long term outcomes and prevent re-admission. Please refer to AM-PAC score for current level of function. Patient Diagnosis(es): The primary encounter diagnosis was Lower abdominal pain. Diagnoses of Other ascites, Failure to thrive in adult, and Diverticulosis of colon were also pertinent to this visit. Assessment   Assessment: Patient limited by fatigued, gera LE buckling during standing, unable to stand for more than 15-20 seconds and required 2 assist for all mobility. Patient would benefit from continued skilled PT services to focus on general overall strength, STSs and ambulation. Activity Tolerance: Patient limited by fatigue;Patient limited by pain; Patient limited by endurance   AM-PAC Score: 9  Plan    Physcial Therapy Plan  General Plan: 5-7 times per week  Current Treatment Recommendations: Strengthening;ROM;Balance training;Functional mobility training;Transfer training;ADL/Self-care training;Cognitive/Perceptual training; Endurance training;Gait training;Neuromuscular re-education;Home exercise program;Safety education & training;Patient/Caregiver education & training;Positioning; Therapeutic activities     Restrictions  Restrictions/Precautions  Restrictions/Precautions: Fall Risk, Bed Alarm, General Precautions  Required Braces or Orthoses?: No     Subjective    Subjective  Subjective: Patient agreeable for PT Treatment. Daughter present.   Orientation  Overall Orientation Status: Within Functional Limits  Orientation Level: Oriented X4  Cognition  Overall Cognitive Status: Exceptions  Arousal/Alertness: Delayed responses to stimuli  Following Commands: Follows one step commands with repetition; Follows one step commands with increased time  Attention Span: Appears intact  Memory: Decreased long term memory;Decreased short term memory  Safety Judgement: Decreased awareness of need for safety  Problem Solving: Decreased awareness of errors;Assistance required to identify errors made;Assistance required to correct errors made  Insights: Decreased awareness of deficits  Initiation: Requires cues for some  Sequencing: Requires cues for some     Objective   Bed Mobility Training  Bed Mobility Training: No  Balance  Sitting:  (SBA in bedside chair)  Standing:  (Mod A x 2 with RW; standing tolerance per trial ~ 5 sec, 20 sec, 20 sec; Pt fatigued very easily this session, and per RN Tierra Barillas, pt has been fatigued throughout the day. Buckling noted bilaterally during each standing unable to attempt lateral Weight shifting. Continuous VCs for hand placement during both Sit to stand and stand to sit with poor carryover. Samson LE buckling noted during each stand)  Transfer Training  Transfer Training: Yes  Overall Level of Assistance: Moderate assistance;Assist X2  Interventions: Safety awareness training; Tactile cues; Verbal cues  Sit to Stand: Moderate assistance;Assist X2  Stand to Sit: Moderate assistance;Assist X2    Gait  Overall Level of Assistance:  (Not safe/appropriate to attempt this date)  Neuromuscular Education  Neuromuscular Education: Yes  Treatment: Standing;Sitting  Facilitation techniques: VCs req for proper breathing sharmila (pursed lip breathing) during functional mobility. Tactile and VCs req for postural control during sit<>stands & amb to promote abdominal and erector spinae mm facilitation for increased stability and balance, decreasing kyphosis of the spine. Pt req VCs to correct for forward WS with squatting in addition to pressing firmly into ground with feet, to promote the appropriate body mechanics for sit<>stand transfers. Safety Devices  Type of Devices: Call light within reach;Gait belt;Patient at risk for falls;Nurse notified; Left in chair; All fall risk precautions in place (Daughter present)  Restraints  Restraints Initially in Place: No       Goals  Short Term Goals  Time Frame for Short Term Goals: 12 visits  Short Term Goal 1: to be independent with all bed mobility  Short Term Goal 2: to be independent with all transfers  Short Term Goal 3: to require min A x 1 for ambulation up to 10'  Short Term Goal 4: to be able to participate in at lesat 25 min of PT    Education  Patient Education  Education Given To: Patient; Family  Education Provided: Role of Therapy;Plan of Care  Education Method: Verbal  Barriers to Learning: Cognition  Education Outcome: Verbalized understanding    Therapy Time   Individual Concurrent Group Co-treatment   Time In 1627         Time Out 1652         Minutes 22             Co-treatment with OT warranted secondary to decreased safety and independence requiring 2 skilled therapy professionals to address individual discipline's goals. PT addressing pre gait trunk strengthening, weight shifting prior to transfers, transfer training, and postural control in sitting/standing.      Radha Rodriguez, PTA

## 2022-12-12 NOTE — PROGRESS NOTES
Occupational Therapy  Facility/Department: [facility] PROGRESSIVE CARE  Daily Treatment Note  NAME: Tierra Love  : 1940  MRN: [id number]    Date of Service: 2022    JULIAN Oglesby reports patient is medically stable for therapy treatment this date. Chart reviewed prior to treatment and patient is agreeable for therapy. All lines intact and patient positioned comfortably at end of treatment. All patient needs addressed prior to ending therapy session. Pt currently functioning below baseline. Recommend daily inpatient skilled therapy at time of discharge to maximize long term outcomes and prevent re-admission. Please refer to AM-PAC score for current level of function. Discharge Recommendations:  Patient would benefit from continued therapy after discharge  OT Equipment Recommendations  Equipment Needed:  (CTA)      Patient Diagnosis(es): The primary encounter diagnosis was Lower abdominal pain. Diagnoses of Other ascites, Failure to thrive in adult, and Diverticulosis of colon were also pertinent to this visit. Assessment    Assessment: Overall, pt had Poor tolerance for activity this date, limited by generalized weakness, fatigue, decreased functional activity tolerance, and abdominal pain. Pt required the assist of 2 skilled staff this date to complete STS transfer trials from bedside chair>RW, ModAx2, in order to maintain balance and safety throughout, as pt's BLEs would buckle upon weightbearing and weightshifting tasks. Pt currently DEP for all ADLs with standing components at this time. Continued skilled OT services are indicated to maximize this pt's safety and IND with self care tasks and to facilitate safe return to PLOF as able. Activity Tolerance: Patient limited by fatigue;Patient limited by pain; Patient limited by endurance  Discharge Recommendations: Patient would benefit from continued therapy after discharge  Equipment Needed:  (CTA)      Plan   Occupational Therapy Plan  Times Per Week: 4-5X per week  Therapy Duration:  (LOS)  Current Treatment Recommendations: Strengthening;Balance training;Functional mobility training; Endurance training;Cognitive reorientation; Safety education & training;Self-Care / ADL;Neuromuscular re-education;Patient/Caregiver education & training     Restrictions  Restrictions/Precautions  Restrictions/Precautions: Fall Risk, Bed Alarm, General Precautions  Required Braces or Orthoses?: No    Subjective   Subjective  Subjective: Pt seated in bedside recliner upon entry, pleasant and cooperative with therapy treatment session. Pt's family present at bedside. Pt c/o some pain in abdomen, rated 4/10 at this time, RN aware. Orientation  Overall Orientation Status: Within Functional Limits  Cognition  Overall Cognitive Status: Exceptions  Arousal/Alertness: Delayed responses to stimuli  Following Commands: Follows one step commands with repetition; Follows one step commands with increased time  Attention Span: Appears intact  Memory: Decreased long term memory;Decreased short term memory  Safety Judgement: Decreased awareness of need for safety  Problem Solving: Decreased awareness of errors;Assistance required to identify errors made;Assistance required to correct errors made  Insights: Decreased awareness of deficits  Initiation: Requires cues for some  Sequencing: Requires cues for some        Objective       Bed Mobility Training  Bed Mobility Training: No (Pt seated in bedside recliner upon entry and retired to bedside recliner at session end.)  Balance  Sitting:  (SBA in bedside chair)  Standing:  (ModAx2 with RW; standing tolerance per trial ~ 5 sec, 20 sec, 20 sec; Pt fatigued very easily this session, and per RN Osvaldo Rodriguez, pt has been fatigued throughout the day.)  Transfer Training  Transfer Training: Yes  Overall Level of Assistance: Moderate assistance;Assist X2 (Facilitated 3 STS transfer trials from chair>RW with pt this date, with pt requiring ModAx2.  Pt's pt will  Short Term Goal 1: complete grooming task with setup  Short Term Goal 2: complete UB bathe and dress with min assist  Short Term Goal 3: complete LB bathe and dress with mod assist (Modified by LULU Demarco/L on 12/12/2022)  Short Term Goal 4: complete toileting task with min assist  Short Term Goal 5: complete functional mobility for ADL with CG  Patient Goals   Patient goals : To get stronger       Therapy Time   Individual Concurrent Group Co-treatment   Time In 1627         Time Out 1652         Minutes 25            Co-treatment with PT warranted secondary to decreased safety and independence requiring 2 skilled therapy professionals to address individual discipline's goals. OT addressing preparation for ADL transfer, sitting balance for increased ADL performance, sitting/activity tolerance, fall prevention, ability to sequence and follow directions, and functional UE strength.         Domitila Collier, OT

## 2022-12-12 NOTE — PROGRESS NOTES
Speech Language Pathology  [facility]  Speech Language Pathology    Date: 12/12/2022  Patient Name: Franklin Mckeon  YOB: 1940   AGE: 81 y.o. MRN: [id number]        Patient Not Available for Speech Therapy     Due to:  [] Testing  [] Hemodialysis  [] Cancelled by RN  [] Surgery   [] Intubation/Sedation/Pain Medication  [] Medical instability  [x] Other: Spoke with RN, pt. Nauseous, not wanting to eat at this time. Next scheduled treatment: 12/13  Completed by: Dario Curry, SLP, M.A.  CCC-SLP

## 2022-12-12 NOTE — PROGRESS NOTES
GI Progress notes    12/12/2022   10:50 AM    Name:  Sanam Churchill  MRN:    [id number]     Acct:     [de-identified]   Room:  [address] [place] [address] Day: 3     Admit Date: 12/9/2022  5:11 PM  PCP: Alejandrina Byrnes DO    Subjective:     C/C:   Chief Complaint   Patient presents with    Abdominal Pain     Loss of appetite       Interval History: Status: not changed. Patient seen and examined  No acute events overnight  Continues to have abdominal pain to palpation  Awaiting paracentesis this am  Was started on Rocephin this am and appears to be tolerating at present. ROS:  Constitutional: negative for chills, fevers and sweats  Gastrointestinal: negative for abdominal pain, constipation, diarrhea, nausea and vomiting  Neurological: negative for dizziness and headaches    Medications: Allergies:    Allergies   Allergen Reactions    Ciprofloxacin Other (See Comments)     Nerve pain exacerbation in feet    Codeine Other (See Comments)     insomia & tachycardia    Morphine      Pt states he has problems with his bp when given morphine    Pletal [Cilostazol]     Sulfamethoxazole-Trimethoprim     Pcn [Penicillins] Rash       Current Meds: cefTRIAXone (ROCEPHIN) 1,000 mg in dextrose 5 % 50 mL IVPB mini-bag, Q24H  dextrose 5 % and 0.45 % sodium chloride infusion, Continuous  potassium chloride 10 mEq/100 mL IVPB (Peripheral Line), PRN  amitriptyline (ELAVIL) tablet 25 mg, Nightly  clopidogrel (PLAVIX) tablet 75 mg, Daily  donepezil (ARICEPT) tablet 10 mg, BID  famotidine (PEPCID) tablet 20 mg, Daily  finasteride (PROSCAR) tablet 5 mg, Daily  magnesium oxide (MAG-OX) tablet 400 mg, Daily  melatonin tablet 9 mg, Nightly  nitroGLYCERIN (NITROSTAT) SL tablet 0.4 mg, Q5 Min PRN  pregabalin (LYRICA) capsule 100 mg, BID  rosuvastatin (CRESTOR) tablet 20 mg, Daily  sucralfate (CARAFATE) tablet 1 g, 4x Daily  enoxaparin (LOVENOX) injection 40 mg, Daily  sodium chloride flush 0.9 % injection 5-40 mL, 2 times per day  sodium chloride flush 0.9 % injection 5-40 mL, PRN  sodium chloride flush 0.9 % injection 5-40 mL, 2 times per day  sodium chloride flush 0.9 % injection 10 mL, PRN  0.9 % sodium chloride infusion, PRN  magnesium sulfate 1000 mg in dextrose 5% 100 mL IVPB, PRN  ondansetron (ZOFRAN-ODT) disintegrating tablet 4 mg, Q8H PRN   Or  ondansetron (ZOFRAN) injection 4 mg, Q6H PRN  polyethylene glycol (GLYCOLAX) packet 17 g, Daily PRN  acetaminophen (TYLENOL) tablet 650 mg, Q6H PRN   Or  acetaminophen (TYLENOL) suppository 650 mg, Q6H PRN        Data:     Code Status:  Full Code    No family history on file. Social History     Socioeconomic History    Marital status:      Spouse name: Not on file    Number of children: Not on file    Years of education: Not on file    Highest education level: Not on file   Occupational History    Not on file   Tobacco Use    Smoking status: Never    Smokeless tobacco: Never   Vaping Use    Vaping Use: Never used   Substance and Sexual Activity    Alcohol use: No    Drug use: No    Sexual activity: Not on file   Other Topics Concern    Not on file   Social History Narrative    Not on file     Social Determinants of Health     Financial Resource Strain: Not on file   Food Insecurity: Not on file   Transportation Needs: Not on file   Physical Activity: Not on file   Stress: Not on file   Social Connections: Not on file   Intimate Partner Violence: Not on file   Housing Stability: Not on file       Vitals:  /68   Pulse 79   Temp 98.2 °F (36.8 °C)   Resp 14   Ht 5' 9\" (1.753 m)   Wt 145 lb (65.8 kg)   SpO2 92%   BMI 21.41 kg/m²   Temp (24hrs), Av.3 °F (36.8 °C), Min:97.5 °F (36.4 °C), Max:99.3 °F (37.4 °C)    No results for input(s): POCGLU in the last 72 hours. I/O (24Hr):     Intake/Output Summary (Last 24 hours) at 2022 1050  Last data filed at 2022 0938  Gross per 24 hour   Intake --   Output 1050 ml   Net -1050 ml       Labs:      CBC:   Lab Results   Component Value Date/Time    WBC 10.4 12/12/2022 05:11 AM    RBC 4.00 12/12/2022 05:11 AM    HGB 11.3 12/12/2022 05:11 AM    HCT 36.0 12/12/2022 05:11 AM    MCV 90.0 12/12/2022 05:11 AM    MCH 28.3 12/12/2022 05:11 AM    MCHC 31.4 12/12/2022 05:11 AM    RDW 13.4 12/12/2022 05:11 AM     12/12/2022 05:11 AM    MPV 10.5 12/12/2022 05:11 AM     CBC with Differential:    Lab Results   Component Value Date/Time    WBC 10.4 12/12/2022 05:11 AM    RBC 4.00 12/12/2022 05:11 AM    HGB 11.3 12/12/2022 05:11 AM    HCT 36.0 12/12/2022 05:11 AM     12/12/2022 05:11 AM    MCV 90.0 12/12/2022 05:11 AM    MCH 28.3 12/12/2022 05:11 AM    MCHC 31.4 12/12/2022 05:11 AM    RDW 13.4 12/12/2022 05:11 AM    LYMPHOPCT 11 12/12/2022 05:11 AM    MONOPCT 13 12/12/2022 05:11 AM    BASOPCT 0 12/12/2022 05:11 AM    MONOSABS 1.39 12/12/2022 05:11 AM    LYMPHSABS 1.10 12/12/2022 05:11 AM    EOSABS 0.08 12/12/2022 05:11 AM    BASOSABS 0.03 12/12/2022 05:11 AM    DIFFTYPE NOT REPORTED 03/03/2017 10:59 PM     Hemoglobin/Hematocrit:    Lab Results   Component Value Date/Time    HGB 11.3 12/12/2022 05:11 AM    HCT 36.0 12/12/2022 05:11 AM     CMP:    Lab Results   Component Value Date/Time     12/12/2022 05:11 AM    K 3.8 12/12/2022 05:11 AM     12/12/2022 05:11 AM    CO2 25 12/12/2022 05:11 AM    BUN 6 12/12/2022 05:11 AM    CREATININE 0.80 12/12/2022 05:11 AM    GFRAA >60 10/12/2021 07:50 AM    LABGLOM >60 12/12/2022 05:11 AM    GLUCOSE 118 12/12/2022 05:11 AM    PROT 5.3 12/12/2022 05:11 AM    LABALBU 2.5 12/12/2022 05:11 AM    CALCIUM 8.1 12/12/2022 05:11 AM    BILITOT 0.3 12/12/2022 05:11 AM    ALKPHOS 71 12/12/2022 05:11 AM    AST 27 12/12/2022 05:11 AM    ALT 12 12/12/2022 05:11 AM     BMP:    Lab Results   Component Value Date/Time     12/12/2022 05:11 AM    K 3.8 12/12/2022 05:11 AM     12/12/2022 05:11 AM    CO2 25 12/12/2022 05:11 AM    BUN 6 12/12/2022 05:11 AM    LABALBU 2.5 12/12/2022 05:11 AM    CREATININE 0.80 12/12/2022 05:11 AM    CALCIUM 8.1 12/12/2022 05:11 AM    GFRAA >60 10/12/2021 07:50 AM    LABGLOM >60 12/12/2022 05:11 AM    GLUCOSE 118 12/12/2022 05:11 AM     PT/INR:    Lab Results   Component Value Date/Time    PROTIME 15.1 12/10/2022 05:23 AM    INR 1.2 12/10/2022 05:23 AM     PTT:    Lab Results   Component Value Date/Time    APTT 30.3 12/09/2022 09:10 PM   [APTT}    Physical Examination:        General appearance: alert, cooperative and no distress  Mental Status: oriented to person, place and time and normal affect  Lungs: clear to auscultation bilaterally, normal effort  Heart: regular rate and rhythm, no murmur,  Abdomen: soft, nontender, distended, bowel sounds present all four quadrants, no masses, hepatomegaly or splenomegaly  Extremities: no edema, redness or tenderness in the calves  Skin: no gross lesions, rashes, or induration    Assessment:        Primary Problem  Other ascites     Active Hospital Problems    Diagnosis Date Noted    Lower abdominal pain [R10.30] 12/10/2022     Priority: Medium    Diffuse abdominal pain [R10.84] 12/10/2022     Priority: Medium    Failure to thrive in adult [R62.7] 12/10/2022     Priority: Medium    Anorexia [R63.0] 12/10/2022     Priority: Medium    Other ascites [R18.8] 12/09/2022     Priority: Medium     Past Medical History:   Diagnosis Date    Bladder stone     CAD (coronary artery disease)     MI, Stents, CABG    Diverticulitis     GERD (gastroesophageal reflux disease)     Hyperlipidemia     Hypertension     Intermittent self-catheterization of bladder     LBBB (left bundle branch block) 3/4/2017    MI (myocardial infarction) (HCC)     Neuropathy     bilat toes    Polio     age 10    Wears glasses         Plan:        Abdominal pain, ?  Hx of cirrhosis, ascites on imaging  Paracentesis with paracentesis panel today  Suspect SBP, consult ID for antibiotic recommendation, patient has allergy to penicillins, cephalosporins  Was started on Rocephin last night and appears to be tolerating  Liver disease workup in progress  2 gm sodium diet  Dysphagia, malnutrition  Passed swallow study  Continue ST recommendations     Time spent reviewing the chart, seeing the patient, and discussing with the attending MD around 30 minutes.     Explained to the patient and d/W Nursing Staff  Will F/U with you  Please call or Page for any issues or change in status  Thanks    Electronically signed by MAJO Joseph NP on 12/12/2022 at 10:50 AM

## 2022-12-13 ENCOUNTER — APPOINTMENT (OUTPATIENT)
Dept: GENERAL RADIOLOGY | Age: 82
DRG: 374 | End: 2022-12-13
Payer: MEDICARE

## 2022-12-13 LAB
ABSOLUTE EOS #: 0.07 K/UL (ref 0–0.44)
ABSOLUTE IMMATURE GRANULOCYTE: 0.03 K/UL (ref 0–0.3)
ABSOLUTE LYMPH #: 1.06 K/UL (ref 1.1–3.7)
ABSOLUTE MONO #: 1.26 K/UL (ref 0.1–1.2)
ALBUMIN SERPL-MCNC: 2.4 G/DL (ref 3.5–5.2)
ALP BLD-CCNC: 72 U/L (ref 40–129)
ALT SERPL-CCNC: 11 U/L (ref 5–41)
ANION GAP SERPL CALCULATED.3IONS-SCNC: 9 MMOL/L (ref 9–17)
ANTI JO-1 IGG: <0.4 U/ML
ANTI SSA: 0.6 U/ML
ANTI SSB: <0.3 U/ML
ANTI-CENTROMERE: 0.4 U/ML
ANTI-RNP70: 0.9 U/ML
ANTI-SCLERODERMA: <0.6 U/ML
ANTI-SMITH: 2.1 U/ML
ANTI-U1RNP: 1.6 U/ML
AST SERPL-CCNC: 21 U/L
BASOPHILS # BLD: 0 % (ref 0–2)
BASOPHILS ABSOLUTE: 0.03 K/UL (ref 0–0.2)
BILIRUB SERPL-MCNC: 0.2 MG/DL (ref 0.3–1.2)
BUN BLDV-MCNC: 4 MG/DL (ref 8–23)
BUN/CREAT BLD: 5 (ref 9–20)
CALCIUM SERPL-MCNC: 8.1 MG/DL (ref 8.6–10.4)
CASE NUMBER:: NORMAL
CHLORIDE BLD-SCNC: 102 MMOL/L (ref 98–107)
CMV IGM: 0.1
CO2: 26 MMOL/L (ref 20–31)
CREAT SERPL-MCNC: 0.73 MG/DL (ref 0.7–1.2)
EOSINOPHILS RELATIVE PERCENT: 1 % (ref 1–4)
GFR SERPL CREATININE-BSD FRML MDRD: >60 ML/MIN/1.73M2
GLUCOSE BLD-MCNC: 116 MG/DL (ref 70–99)
HCT VFR BLD CALC: 38 % (ref 40.7–50.3)
HEMOGLOBIN: 11.9 G/DL (ref 13–17)
IMMATURE GRANULOCYTES: 0 %
LYMPHOCYTES # BLD: 12 % (ref 24–43)
MAGNESIUM: 1.7 MG/DL (ref 1.6–2.6)
MCH RBC QN AUTO: 28.1 PG (ref 25.2–33.5)
MCHC RBC AUTO-ENTMCNC: 31.3 G/DL (ref 28.4–34.8)
MCV RBC AUTO: 89.6 FL (ref 82.6–102.9)
MONOCYTES # BLD: 14 % (ref 3–12)
NRBC AUTOMATED: 0 PER 100 WBC
PDW BLD-RTO: 13.5 % (ref 11.8–14.4)
PLATELET # BLD: 314 K/UL (ref 138–453)
PMV BLD AUTO: 10.6 FL (ref 8.1–13.5)
POTASSIUM SERPL-SCNC: 3.5 MMOL/L (ref 3.7–5.3)
RBC # BLD: 4.24 M/UL (ref 4.21–5.77)
SEG NEUTROPHILS: 73 % (ref 36–65)
SEGMENTED NEUTROPHILS ABSOLUTE COUNT: 6.35 K/UL (ref 1.5–8.1)
SODIUM BLD-SCNC: 137 MMOL/L (ref 135–144)
SPECIMEN DESCRIPTION: NORMAL
TOTAL PROTEIN: 5.2 G/DL (ref 6.4–8.3)
WBC # BLD: 8.8 K/UL (ref 3.5–11.3)

## 2022-12-13 PROCEDURE — 83735 ASSAY OF MAGNESIUM: CPT

## 2022-12-13 PROCEDURE — 99232 SBSQ HOSP IP/OBS MODERATE 35: CPT | Performed by: INTERNAL MEDICINE

## 2022-12-13 PROCEDURE — APPSS30 APP SPLIT SHARED TIME 16-30 MINUTES: Performed by: NURSE PRACTITIONER

## 2022-12-13 PROCEDURE — 85025 COMPLETE CBC W/AUTO DIFF WBC: CPT

## 2022-12-13 PROCEDURE — 6370000000 HC RX 637 (ALT 250 FOR IP): Performed by: FAMILY MEDICINE

## 2022-12-13 PROCEDURE — 2060000000 HC ICU INTERMEDIATE R&B

## 2022-12-13 PROCEDURE — 97116 GAIT TRAINING THERAPY: CPT

## 2022-12-13 PROCEDURE — 80053 COMPREHEN METABOLIC PANEL: CPT

## 2022-12-13 PROCEDURE — 2580000003 HC RX 258: Performed by: NURSE PRACTITIONER

## 2022-12-13 PROCEDURE — 2580000003 HC RX 258: Performed by: INTERNAL MEDICINE

## 2022-12-13 PROCEDURE — 71045 X-RAY EXAM CHEST 1 VIEW: CPT

## 2022-12-13 PROCEDURE — 36415 COLL VENOUS BLD VENIPUNCTURE: CPT

## 2022-12-13 PROCEDURE — 6360000002 HC RX W HCPCS: Performed by: NURSE PRACTITIONER

## 2022-12-13 PROCEDURE — 97530 THERAPEUTIC ACTIVITIES: CPT

## 2022-12-13 PROCEDURE — 6360000002 HC RX W HCPCS: Performed by: FAMILY MEDICINE

## 2022-12-13 PROCEDURE — 6360000002 HC RX W HCPCS: Performed by: INTERNAL MEDICINE

## 2022-12-13 PROCEDURE — 2580000003 HC RX 258: Performed by: FAMILY MEDICINE

## 2022-12-13 RX ADMIN — SUCRALFATE 1 G: 1 TABLET ORAL at 21:15

## 2022-12-13 RX ADMIN — AMITRIPTYLINE HYDROCHLORIDE 25 MG: 25 TABLET, FILM COATED ORAL at 21:15

## 2022-12-13 RX ADMIN — ONDANSETRON 4 MG: 2 INJECTION INTRAMUSCULAR; INTRAVENOUS at 16:52

## 2022-12-13 RX ADMIN — SUCRALFATE 1 G: 1 TABLET ORAL at 08:26

## 2022-12-13 RX ADMIN — SODIUM CHLORIDE: 9 INJECTION, SOLUTION INTRAVENOUS at 06:36

## 2022-12-13 RX ADMIN — PREGABALIN 100 MG: 100 CAPSULE ORAL at 21:15

## 2022-12-13 RX ADMIN — DONEPEZIL HYDROCHLORIDE 10 MG: 10 TABLET, FILM COATED ORAL at 08:26

## 2022-12-13 RX ADMIN — ROSUVASTATIN CALCIUM 10 MG: 10 TABLET, FILM COATED ORAL at 08:26

## 2022-12-13 RX ADMIN — CLOPIDOGREL BISULFATE 75 MG: 75 TABLET ORAL at 08:26

## 2022-12-13 RX ADMIN — FAMOTIDINE 40 MG: 20 TABLET, FILM COATED ORAL at 21:16

## 2022-12-13 RX ADMIN — PANTOPRAZOLE SODIUM 40 MG: 40 TABLET, DELAYED RELEASE ORAL at 06:10

## 2022-12-13 RX ADMIN — Medication 400 MG: at 08:26

## 2022-12-13 RX ADMIN — SODIUM CHLORIDE, PRESERVATIVE FREE 10 ML: 5 INJECTION INTRAVENOUS at 08:31

## 2022-12-13 RX ADMIN — FINASTERIDE 5 MG: 5 TABLET, FILM COATED ORAL at 08:26

## 2022-12-13 RX ADMIN — Medication 9 MG: at 21:24

## 2022-12-13 RX ADMIN — POTASSIUM CHLORIDE 10 MEQ: 7.46 INJECTION, SOLUTION INTRAVENOUS at 10:42

## 2022-12-13 RX ADMIN — FAMOTIDINE 40 MG: 20 TABLET, FILM COATED ORAL at 08:26

## 2022-12-13 RX ADMIN — CEFTRIAXONE SODIUM 1000 MG: 1 INJECTION, POWDER, FOR SOLUTION INTRAMUSCULAR; INTRAVENOUS at 21:30

## 2022-12-13 RX ADMIN — PREGABALIN 100 MG: 100 CAPSULE ORAL at 08:28

## 2022-12-13 RX ADMIN — DEXTROSE AND SODIUM CHLORIDE: 5; 450 INJECTION, SOLUTION INTRAVENOUS at 15:25

## 2022-12-13 RX ADMIN — POTASSIUM CHLORIDE 10 MEQ: 7.46 INJECTION, SOLUTION INTRAVENOUS at 12:01

## 2022-12-13 RX ADMIN — ENOXAPARIN SODIUM 40 MG: 100 INJECTION SUBCUTANEOUS at 08:26

## 2022-12-13 RX ADMIN — DEXTROSE AND SODIUM CHLORIDE: 5; 450 INJECTION, SOLUTION INTRAVENOUS at 02:47

## 2022-12-13 RX ADMIN — POTASSIUM CHLORIDE 10 MEQ: 7.46 INJECTION, SOLUTION INTRAVENOUS at 08:35

## 2022-12-13 RX ADMIN — ONDANSETRON 4 MG: 2 INJECTION INTRAMUSCULAR; INTRAVENOUS at 08:27

## 2022-12-13 RX ADMIN — SUCRALFATE 1 G: 1 TABLET ORAL at 12:39

## 2022-12-13 RX ADMIN — DONEPEZIL HYDROCHLORIDE 10 MG: 10 TABLET, FILM COATED ORAL at 21:15

## 2022-12-13 RX ADMIN — SUCRALFATE 1 G: 1 TABLET ORAL at 16:52

## 2022-12-13 RX ADMIN — POTASSIUM CHLORIDE 10 MEQ: 7.46 INJECTION, SOLUTION INTRAVENOUS at 06:38

## 2022-12-13 ASSESSMENT — ENCOUNTER SYMPTOMS
ABDOMINAL PAIN: 1
NAUSEA: 1
DIARRHEA: 1
CONSTIPATION: 0
SHORTNESS OF BREATH: 0
COUGH: 0
RESPIRATORY NEGATIVE: 1
VOMITING: 1

## 2022-12-13 NOTE — PROGRESS NOTES
[facility]  Progress Note    Laura Baxter  MRN: [id number]  Date: 12/13/2022  LOS: 4     Reason for F/U :   Suspected Spontaneous Bacterial Peritonitis    Impression :   Ascites of unknown origin with workup to R/O Spontaneous Bacterial Peritonitis  No abnormalities of liver noted on CT abdomen 12/9 or of LFTs   Ascites:serum albumin ratio 0.6 and ascites:serum creatinine ratio 0.95 ruling out portal HTN and no perforations of bladder  Fluid cell count is pending to assess for SBP  History of diverticulitis of the bladder and sigmoid colon without perforations or surgical correction  CT abdomen 12/9 does not show active diverticulitis  Gastroesophageal Reflux Disease without Esophagitis  Alzheimer's  Failure to thrive  CAD s/ CABG  Benign Prostate Hyperplasia    Recommendations:   Patient does not appear systemically ill and other than some abdominal tenderness, the overall clinical picture does not suggest SBP given the history of progressive abdominal distention over the last 2 months. Paracentesis 12/12/22 removed 3L of fluid without evidence of bladder leakage or portal HTN  Initial examination did not reveal bacteria, but many neutrophils present; cell count in progress  I will continue empiric antimicrobial therapy with IV Rocephin at this time pending paracentesis results  GI is following- EGD scheduled 12/15 inpatient; colonoscopy not scheduled at this time    Infection Control Recommendations:   Universal Precautions    Discharge Planning:   Estimated Length of IV antimicrobials: TBD  Patient will need Midline Catheter Insertion/ PICC line Insertion: No  Patient will need: Home IV , [place],  SNF,  LTAC: Undetermined  Patient willneed outpatient wound care: No    Medical Decision making / Summary of Stay:   Laura Baxter is a 81y.o.-year-old male who was initially admitted on 12/9/2022 for diffuse abdominal pain with distension.  History was mainly provided by the wife and daughter who were at bedside. Abdominal problems began October where patient was only experiencing pain. CT abdomen imaging revealed diverticulitis of the bladder; treated by Dr. Dixon Landry who performed a cystoscopy to \"drain all the pus\" and then with Levaquin. Patient tolerated the medication for 5 days before worsening of neuropathy to the point where the patient was unable to walk- Levaquin discontinued. After cystoscopy, patient developed GERD, diarrhea 2-3x per day that was watery and rust colored, and further worsening abdominal pain requiring another ED visit to [address] [place] [address] 17-M  where CT abdomen revealed sigmoid colon diverticulitis treated with an antibiotic, but family could not recall name. No surgery was needed at time. However with treatment, symptoms worsened and by , patient had to be hospitalized due to weakness and continued diverticulitis that required Flagyl. Patient presented to [address] [place] [address] after worsening weight loss, decreased appetite, abdominal pain, increased distension, and recurrent vomiting productive of yellow mucus. CT abdomen showed ascites without diverticulitis, perforations, or abscess. Liver function test without abnormalities. No leukocytosis. PMHx significant for CAD s/p CABG, BPH, hyperlipidemia, Alzheimer's, GERD, nephrolithiasis, and bilateral lower extremity neuropathy. Last colonoscopy was  without abnormal findings per family. Today, patient denies fevers, chills, SOB, cough, dysuria, hematuria, hematochezia, and melena. Positive for abdominal pain, nausea without vomiting, diarrhea, GERD, distension, and abdominal pain.     Current evaluation:2022    /66   Pulse 70   Temp 98.6 °F (37 °C) (Oral)   Resp 18   Ht 5' 9\" (1.753 m)   Wt 145 lb (65.8 kg)   SpO2 93%   BMI 21.41 kg/m²     Temperature Range: Temp: 98.6 °F (37 °C) Temp  Av.7 °F (37.1 °C)  Min: 97.5 °F (36.4 °C)  Max: 99.7 °F (37.6 °C)    Patient examined at bedside; alert and awake  Complains of continued N/V/D with some emesis today productive of sputum  No acute events overnight, wife at bedside    Review of Systems   Constitutional:  Positive for appetite change (decreased due to nausea). Negative for chills, fatigue and fever. Respiratory: Negative. Negative for cough and shortness of breath. Cardiovascular: Negative. Negative for chest pain. Gastrointestinal:  Positive for abdominal pain (lower abdominal), diarrhea (2-3 watery rust colored BM/day), nausea and vomiting (white-yellow mucus). Negative for constipation. Genitourinary: Negative. Negative for dysuria and hematuria. Albright catheter secured in place     Neurological:  Positive for numbness (bilateral lower extremity with paresthesia). Negative for dizziness, weakness and headaches. Physical Examination :     Physical Exam  Vitals reviewed. Constitutional:       Appearance: Normal appearance. He is normal weight. He is ill-appearing. HENT:      Head: Normocephalic and atraumatic. Cardiovascular:      Rate and Rhythm: Normal rate and regular rhythm. Heart sounds: Normal heart sounds. No murmur heard. No friction rub. No gallop. Pulmonary:      Effort: Pulmonary effort is normal. No respiratory distress. Breath sounds: Normal breath sounds. No decreased breath sounds, wheezing, rhonchi or rales. Abdominal:      General: Abdomen is flat and protuberant. Bowel sounds are normal. There is distension. Palpations: Abdomen is soft. There is no splenomegaly or mass. Tenderness: There is abdominal tenderness in the right lower quadrant, suprapubic area and left lower quadrant. There is no guarding or rebound. Hernia: No hernia is present. Musculoskeletal:         General: Normal range of motion. Cervical back: Normal range of motion and neck supple. No tenderness. Right lower leg: No edema. Left lower leg: No edema.    Lymphadenopathy:      Cervical: No cervical adenopathy. Skin:     General: Skin is warm and dry. Neurological:      Mental Status: He is alert and oriented to person, place, and time. Laboratory data:   I have independently reviewed the followinglabs:  CBC with Differential:   Recent Labs     12/12/22  0511 12/13/22  0513   WBC 10.4 8.8   HGB 11.3* 11.9*   HCT 36.0* 38.0*    314   LYMPHOPCT 11* 12*   MONOPCT 13* 14*     BMP:   Recent Labs     12/11/22  0507 12/12/22  0511 12/13/22  0513    137 137   K 3.5* 3.8 3.5*    104 102   CO2 26 25 26   BUN 9 6* 4*   CREATININE 0.86 0.80 0.73   MG 1.9  --  1.7     Hepatic Function Panel:   Recent Labs     12/12/22 0511 12/13/22 0513   PROT 5.3* 5.2*   LABALBU 2.5* 2.4*   BILITOT 0.3 0.2*   ALKPHOS 71 72   ALT 12 11   AST 27 21         No results found for: PROCAL  No results found for: CRP  No results found for: SEDRATE      No results found for: DDIMER  Lab Results   Component Value Date/Time    FERRITIN 97 12/10/2022 10:20 AM     No results found for: LDH  No results found for: FIBRINOGEN    Results in Past 30 Days  Result Component Current Result Ref Range Previous Result Ref Range   SARS-CoV-2, Rapid Not Detected (12/9/2022) Not Detected Not in Time Range      Lab Results   Component Value Date/Time    COVID19 Not Detected 12/09/2022 08:14 PM       No results for input(s): [facility] in the last 72 hours. Imaging Studies:       Narrative   EXAMINATION:   CT OF THE ABDOMEN AND PELVIS WITH CONTRAST 12/9/2022 6:23 pm       TECHNIQUE:   CT of the abdomen and pelvis was performed with the administration of   intravenous contrast. Multiplanar reformatted images are provided for review. Automated exposure control, iterative reconstruction, and/or weight based   adjustment of the mA/kV was utilized to reduce the radiation dose to as low   as reasonably achievable.        COMPARISON:   None       HISTORY:   ORDERING SYSTEM PROVIDED HISTORY: Abdominal pain, recent dx of Diverticulitis   TECHNOLOGIST PROVIDED HISTORY: Abdominal pain, recent dx of Diverticulitis       Decision Support Exception - unselect if not a suspected or confirmed   emergency medical condition->Emergency Medical Condition ([place])   Reason for Exam: Abdominal pain, n/v, recent dx of diverticulitis. FINDINGS:   Lower Chest: The lung bases are clear. Bilateral gynecomastia is noted. Organs: The liver, gallbladder, spleen, pancreas and adrenal glands are   intact. The kidneys reveal no evidence for hydronephrosis or renal mass. There is a large cyst within the lower pole of the left kidney. No renal   calculi are visualized. GI/Bowel: The bowel is normal in caliber. No free air is appreciated. There   is reticulation of the mesentery diffusely likely representing edema. Moderate ascites is appreciated primary involving the right and left   paracolic gutters. Severe left colon diverticulosis is identified without   evidence of acute diverticulitis. Pelvis: Numerous bladder diverticula are identified. No bladder calculus is   seen. Peritoneum/Retroperitoneum: No lymphadenopathy is detected. Mild aortic   atherosclerosis is appreciated. Bones/Soft Tissues: Osteopenia is identified. Laminectomy is identified at   L4 and L5. Cultures:     Procedure Component Value Units Date/Time   Culture, Body Fluid [[id number]] (Abnormal) Collected: 12/12/22 1400   Order Status: Completed Specimen: Ascitic Fluid Updated: 12/13/22 0736    Specimen Description . ASCITIC FLUID    Direct Exam MANY NEUTROPHILS Abnormal      NO ORGANISMS SEEN    Culture NO GROWTH 11 HOURS   Gram stain [[id number]] Collected: 12/10/22 1015   Order Status: Canceled Specimen: Ascitic Fluid    COVID-19, Rapid [[id number]] Collected: 12/09/22 2014   Order Status: Completed Specimen: Nasopharyngeal Swab Updated: 12/09/22 2102    Specimen Description . NASOPHARYNGEAL SWAB    SARS-CoV-2, Rapid Not Detected Comment:        Rapid NAAT:  The specimen is NEGATIVE for SARS-CoV-2, the novel coronavirus associated with   COVID-19. The ID NOW COVID-19 assay is designed to detect the virus that causes COVID-19 in patients   with signs and symptoms of infection who are suspected of COVID-19. An individual without symptoms of COVID-19 and who is not shedding SARS-CoV-2 virus would   expect to have a negative (not detected) result in this assay. Negative results should be treated as presumptive and, if inconsistent with clinical signs   and symptoms or necessary for patient management,   should be tested with an alternative molecular assay. Negative results do not preclude   SARS-CoV-2 infection and   should not be used as the sole basis for patient management decisions. Fact sheet for Healthcare Providers: http://www.emy.lesly/   Fact sheet for Patients: http://www.emy.lesly/         Methodology: Isothermal Nucleic Acid Amplification       Rapid influenza A/B antigens [[id number]] Collected: 12/09/22 2014   Order Status: Completed Specimen: Nasopharyngeal Updated: 12/09/22 2043    Flu A Antigen NEGATIVE    Comment: for Influenza A Antigen       Flu B Antigen NEGATIVE    Comment: for Influenza B Antigen.           Medications:      famotidine  40 mg Oral BID    rosuvastatin  10 mg Oral Daily    pantoprazole  40 mg Oral QAM AC    cefTRIAXone (ROCEPHIN) IV  1,000 mg IntraVENous Q24H    amitriptyline  25 mg Oral Nightly    clopidogrel  75 mg Oral Daily    donepezil  10 mg Oral BID    finasteride  5 mg Oral Daily    magnesium oxide  400 mg Oral Daily    melatonin  9 mg Oral Nightly    pregabalin  100 mg Oral BID    sucralfate  1 g Oral 4x Daily    enoxaparin  40 mg SubCUTAneous Daily    sodium chloride flush  5-40 mL IntraVENous 2 times per day    sodium chloride flush  5-40 mL IntraVENous 2 times per day         [facility]  [facility]  [facility] messaging  OFFICE: [phone]      Electronically signed by Lucy Dunne on 12/13/2022 at 12:46 PM    I have discussed the care of [address] including pertinent history and exam findings,  with the student. I have seen and examined the patient and the key elements of all parts of the encounter have been performed by me. I agree with the assessment, plan and orders as documented by the student. Electronically signed by Ced Rudolph MD on 12/13/2022 at 5:57 PM  [employer] messaging [phone]    Thank you for allowing us to participate in the care of this patient. Please call with questions. This note iscreated with the assistance of a speech recognition program.  While intending to generate a document that actually reflects the content of the visit, the document can still have some errors including those of syntax andsound a like substitutions which may escape proof reading. In such instances, actual meaning can be extrapolated by contextual diversion.

## 2022-12-13 NOTE — PROGRESS NOTES
Physical Therapy  Facility/Department: [facility]  Daily Treatment Note  NAME: Oriana Espinal  : 1940  MRN: [id number]    Date of Service: 2022    Discharge Recommendations:  Patient would benefit from continued therapy after discharge    Pt currently functioning below baseline. Recommend daily inpatient skilled therapy at time of discharge to maximize long term outcomes and prevent re-admission. Please refer to AM-PAC score for current level of function. Patient Diagnosis(es): The primary encounter diagnosis was Lower abdominal pain. Diagnoses of Other ascites, Failure to thrive in adult, and Diverticulosis of colon were also pertinent to this visit. Assessment   Assessment: Patient able to progress to ambulating in room with Mod-max x 2 A but patient is a HIGH FALL RISK d/t gera LE buckling without any warning during gait and patient easily fatigued with minimal distance. Patient would benefit from continued skilled PT services. Activity Tolerance: Patient limited by fatigue;Treatment limited secondary to decreased cognition   AM-PAC Score: 11  Plan    Physcial Therapy Plan  General Plan: 5-7 times per week  Current Treatment Recommendations: Strengthening;ROM;Balance training;Functional mobility training;Transfer training;ADL/Self-care training;Cognitive/Perceptual training; Endurance training;Gait training;Neuromuscular re-education;Home exercise program;Safety education & training;Patient/Caregiver education & training;Positioning; Therapeutic activities     Restrictions  Restrictions/Precautions  Restrictions/Precautions: Fall Risk, Bed Alarm, General Precautions, Up as Tolerated  Required Braces or Orthoses?: No  Position Activity Restriction  Other position/activity restrictions: IV, telemetry     Subjective    Subjective  Subjective: Patient agreeable for PT Treatment. Wife present.   Orientation  Overall Orientation Status: Within Functional Limits  Orientation Level: Oriented X4  Cognition  Overall Cognitive Status: Exceptions  Arousal/Alertness: Appropriate responses to stimuli  Following Commands: Follows one step commands with repetition; Follows one step commands with increased time  Attention Span: Appears intact  Memory: Decreased short term memory;Decreased recall of recent events  Safety Judgement: Decreased awareness of need for assistance;Decreased awareness of need for safety  Problem Solving: Assistance required to generate solutions;Assistance required to implement solutions;Assistance required to identify errors made;Assistance required to correct errors made;Decreased awareness of errors  Insights: Decreased awareness of deficits  Initiation: Requires cues for all  Sequencing: Requires cues for all     Objective   Bed Mobility Training  Bed Mobility Training: Yes  Overall Level of Assistance: Moderate assistance;Assist X2  Interventions: Safety awareness training; Tactile cues; Verbal cues  Sit to Supine: Moderate assistance;Assist X2 (Patient initally up in chair upon arrival but requested to go back to bed at end of session. Patient required assistance with gera LE and trunk control into bed.)  Balance  Sitting: With support (CGA at times, then Min A for LOB while seated in chair if not able to use gera LE to maintain seated balance.)  Standing: With support (w/ RW and Mod-Max x 2 A)  Transfer Training  Transfer Training: Yes  Overall Level of Assistance: Moderate assistance;Assist X2  Interventions: Safety awareness training; Tactile cues; Verbal cues (Extensive VCs and hand over hand assist for hand placement during several STS trials during treatment. VCs for sequencing, \"nose over toes\" technique to get weight over his feet.)  Sit to Stand: Moderate assistance;Assist X2  Stand to Sit: Moderate assistance;Assist X2  Stand Pivot Transfers:  Moderate assistance;Assist X2  Bed to Chair: Moderate assistance;Assist X2  Gait Training  Gait Training: Yes  Gait  Overall Level of Assistance: Moderate assistance;Maximum assistance;Assist X2  Interventions: Safety awareness training; Tactile cues; Verbal cues (Assistance and VCs for fully mgmt of RW, gait sequencing, keep RW with JORGE LUIS and safety during gait. Patient easily fatigued, gera LE buckling and requiring to sit quickly. Poor safety during transfer to chair.)  Speed/Dominga: Shuffled; Slow  Gait Abnormalities: Shuffling gait; Step to gait  Distance (ft):  (15' x 1, 10' x 1)  Assistive Device: Gait belt;Walker, rolling  Neuromuscular Education  Neuromuscular Education: Yes  Treatment: Gait ;Sitting;Standing  Neuromuscular Comments: VCs req for proper breathing sharmila (pursed lip breathing) during functional mobility. Tactile and VCs req for postural control during sit<>stands & amb to promote abdominal and erector spinae mm facilitation for increased stability and balance, decreasing kyphosis of the spine. Pt req VCs to correct for forward WS with squatting in addition to pressing firmly into ground with feet, to promote the appropriate body mechanics for sit<>stand transfers. Safety Devices  Type of Devices: Gait belt;Patient at risk for falls;Nurse notified; All fall risk precautions in place; Bed alarm in place; Left in bed  Restraints  Restraints Initially in Place: No       Goals  Short Term Goals  Time Frame for Short Term Goals: 12 visits  Short Term Goal 1: to be independent with all bed mobility  Short Term Goal 2: to be independent with all transfers  Short Term Goal 3: to require min A x 1 for ambulation up to 10'  Short Term Goal 4: to be able to participate in at lesat 25 min of PT    Education  Patient Education  Education Given To: Patient; Family  Education Provided: Role of Therapy;Plan of Care;Transfer Training;Energy Conservation; Fall Prevention Strategies  Education Method: Verbal  Barriers to Learning: Cognition  Education Outcome: Verbalized understanding;Continued education needed    Therapy Time   Individual Concurrent Group Co-treatment   Time In 9543         Time Out 1330         Minutes [address] [place], [place]

## 2022-12-13 NOTE — PROGRESS NOTES
Occupational Therapy  Facility/Department: [facility] PROGRESSIVE CARE  Rehabilitation Occupational Therapy Daily Treatment Note    Date: 22  Patient Name: Jose Eduardo Barrett       Room: 4736/6654-90  MRN: [id number]  Account: [de-identified]   : 1940  (81 y.o.) Gender: male           Pt currently functioning below baseline. Recommend daily inpatient skilled therapy at time of discharge to maximize long term outcomes and prevent re-admission. Please refer to AM-PAC score for current level of function. Past Medical History:  has a past medical history of Bladder stone, CAD (coronary artery disease), Diverticulitis, GERD (gastroesophageal reflux disease), Hyperlipidemia, Hypertension, Intermittent self-catheterization of bladder, LBBB (left bundle branch block), MI (myocardial infarction) ([facility] [address] Neuropathy, Polio, and Wears glasses. Past Surgical History:   has a past surgical history that includes Coronary artery bypass graft (); Coronary angioplasty with stent; Cardiac surgery (); Colonoscopy; Endoscopy, colon, diagnostic; and Bladder stone removal.    Restrictions  Restrictions/Precautions: Fall Risk;Bed Alarm;General Precautions; Up as Tolerated  Other position/activity restrictions: IV, telemetry  Required Braces or Orthoses?: No    Subjective  Subjective: Pt in recliner upon arrival with family member present, pt agreeable to therapy. Restrictions/Precautions: Fall Risk;Bed Alarm;General Precautions; Up as Tolerated             Objective     Cognition  Overall Cognitive Status: Exceptions  Arousal/Alertness: Appropriate responses to stimuli  Following Commands: Follows one step commands with repetition; Follows one step commands with increased time  Attention Span: Appears intact  Memory: Decreased short term memory;Decreased recall of recent events  Safety Judgement: Decreased awareness of need for assistance;Decreased awareness of need for safety  Problem Solving: Assistance required to generate solutions;Assistance required to implement solutions;Assistance required to identify errors made;Assistance required to correct errors made;Decreased awareness of errors  Insights: Decreased awareness of deficits  Initiation: Requires cues for all  Sequencing: Requires cues for all  Orientation  Overall Orientation Status: Within Functional Limits   Perception  Overall Perceptual Status: Impaired                  Functional Mobility  Device: Rolling walker  Activity:  (mobility in room)  Assistance Level: Moderate assistance;Maximum assistance; Requires x 2 assistance  Skilled Clinical Factors: Max verbal/tactile cues for upright posture, staying inside walker, managing/maneuvering walker safely and for overall safety with poor carryover. Pt gets fatigued easily and knees buckle without warning and pt needs to sit suddenly. Bed Mobility  Overall Assistance Level: Moderate Assistance; Requires x 2 Assistance  Additional Factors: Verbal cues; Increased time to complete; Head of bed flat  Sit to Supine  Assistance Level: Moderate assistance; Requires x 2 assistance  Transfers  Surface: From chair with arms; To bed  Additional Factors: Verbal cues; Hand placement cues  Device: Walker  Sit to Stand  Assistance Level: Moderate assistance; Requires x 2 assistance  Stand to Sit  Assistance Level: Moderate assistance; Requires x 2 assistance  Skilled Clinical Factors: Max verbal/tactile cues for hand placement, nose over toes, upright posture, walker safety/mgmt, staying inside walker, squaring self up and reaching back prior to sitting down, controlled stand to sit and overall safety with poor carryover. Neuromuscular Education  Neuromuscular education: Yes  NDT Treatment: Gait ;Sitting;Standing     Assessment  Assessment  Assessment: Pt tolerated session fair and remains limited by fatigue, global weakness, decreased activity tolerance, cognitive deficits and B LE weakness.  Pt req's 2 staff assist for bed mobility, transfers and mobility with use of RW. Pt is a high fall risk. Continued skilled OT services are indicated to maximize this pt's safety and IND with self care tasks and to facilitate safe return to OF as able. Activity Tolerance: Patient limited by fatigue;Treatment limited secondary to decreased cognition  Discharge Recommendations: Patient would benefit from continued therapy after discharge  Safety Devices  Safety Devices in place: Yes  Type of devices: All fall risk precautions in place; Left in bed;Bed alarm in place;Call light within reach;Nurse notified;Gait belt;Patient at risk for falls    Patient Education  Education  Education Given To: Patient  Education Provided: Mobility Training;Transfer Training;Energy Conservation;Cognition; Safety;Equipment  Education Method: Demonstration;Verbal;Teach Back  Barriers to Learning: Cognition  Education Outcome: Continued education needed    Plan  Occupational Therapy Plan  Times Per Week: 4-5X per week, 1x/day as aylin  Current Treatment Recommendations: Strengthening;Balance training;Functional mobility training; Endurance training;Cognitive reorientation; Safety education & training;Self-Care / ADL;Neuromuscular re-education;Patient/Caregiver education & training    Goals  Patient Goals   Patient goals : To get stronger  Short Term Goals  Time Frame for Short Term Goals:  For LOS in hospital, pt will  Short Term Goal 1: complete grooming task with setup  Short Term Goal 2: complete UB bathe and dress with min assist  Short Term Goal 3: complete LB bathe and dress with mod assist  Short Term Goal 4: complete toileting task with min assist  Short Term Goal 5: complete functional mobility for ADL with CG    AM-PAC Score        AM-[facility] Inpatient Daily Activity Raw Score: 13 (12/13/22 1423)  AM-PAC Inpatient ADL T-Scale Score : 32.03 (12/13/22 1423)  ADL Inpatient CMS 0-100% Score: 63.03 (12/13/22 1423)  ADL Inpatient CMS G-Code Modifier : CL (12/13/22 1423)      Therapy Time   Individual Concurrent Group Co-treatment   Time In       5   Time Out       1330   Minutes       12    Co-treatment with PT warranted secondary to decreased safety and independence requiring 2 skilled therapy professionals to address individual discipline's goals. OT addressing preparation for ADL transfer, sitting balance for increased ADL performance, sitting/activity tolerance, functional reaching, environmental safety/scanning, fall prevention, functional mobility for ADL transfers, ability to sequence and follow directions, bed mobility tech, and functional UE strength.          Micheal Claudia, FABIÁN

## 2022-12-13 NOTE — CARE COORDINATION
Discharge planning    Chart reviewed. Per SS patient has been accepted at [facility] and [facility] if wants snf. Patient also has referral to [facility] yesterday and they can accept. He has walker, w/c and shower bench. Lives with wife and daughter. ID is awaiting paracentesis cultures to finalize atb treatment for potential SBP. Therapy is recommending snf. Met with wife and discussed potential iv atb and hc vs snf. She does not want to make any decisions until after procedure on Thursday. Per RN EGD on Thursday     If home, [facility] has accepted and are aware of potential iv atb   If snf [facility] accepted.

## 2022-12-13 NOTE — PROGRESS NOTES
GI Progress notes    12/13/2022   10:21 AM    Name:  Richard Al  MRN:    [id number]     Acct:     [de-identified]   Room:  [address] [place] [address] Day: 4     Admit Date: 12/9/2022  5:11 PM  PCP: Betty Torres DO    Subjective:     C/C:   Chief Complaint   Patient presents with    Abdominal Pain     Loss of appetite       Interval History: Status: not changed. Patient seen and examined  No acute events overnight  Still c/o abdominal pain  S/p paracentesis with 3.1 nereida ascitic fluid removed  Complete ascitic fluid analysis pending but basing on SAAG this is exudative. ROS:  Constitutional: negative for chills, fevers and sweats  Gastrointestinal: negative for constipation, diarrhea, nausea and vomiting  Neurological: negative for dizziness and headaches    Medications: Allergies:    Allergies   Allergen Reactions    Ciprofloxacin Other (See Comments)     Nerve pain exacerbation in feet    Codeine Other (See Comments)     insomia & tachycardia    Morphine      Pt states he has problems with his bp when given morphine    Pletal [Cilostazol]     Sulfamethoxazole-Trimethoprim     Pcn [Penicillins] Rash       Current Meds: famotidine (PEPCID) tablet 40 mg, BID  rosuvastatin (CRESTOR) tablet 10 mg, Daily  pantoprazole (PROTONIX) tablet 40 mg, QAM AC  cefTRIAXone (ROCEPHIN) 1,000 mg in dextrose 5 % 50 mL IVPB mini-bag, Q24H  dextrose 5 % and 0.45 % sodium chloride infusion, Continuous  potassium chloride 10 mEq/100 mL IVPB (Peripheral Line), PRN  amitriptyline (ELAVIL) tablet 25 mg, Nightly  clopidogrel (PLAVIX) tablet 75 mg, Daily  donepezil (ARICEPT) tablet 10 mg, BID  finasteride (PROSCAR) tablet 5 mg, Daily  magnesium oxide (MAG-OX) tablet 400 mg, Daily  melatonin tablet 9 mg, Nightly  nitroGLYCERIN (NITROSTAT) SL tablet 0.4 mg, Q5 Min PRN  pregabalin (LYRICA) capsule 100 mg, BID  sucralfate (CARAFATE) tablet 1 g, 4x Daily  enoxaparin (LOVENOX) injection 40 mg, Daily  sodium chloride flush 0.9 % injection 5-40 mL, 2 times per day  sodium chloride flush 0.9 % injection 5-40 mL, PRN  sodium chloride flush 0.9 % injection 5-40 mL, 2 times per day  sodium chloride flush 0.9 % injection 10 mL, PRN  0.9 % sodium chloride infusion, PRN  magnesium sulfate 1000 mg in dextrose 5% 100 mL IVPB, PRN  ondansetron (ZOFRAN-ODT) disintegrating tablet 4 mg, Q8H PRN   Or  ondansetron (ZOFRAN) injection 4 mg, Q6H PRN  polyethylene glycol (GLYCOLAX) packet 17 g, Daily PRN  acetaminophen (TYLENOL) tablet 650 mg, Q6H PRN   Or  acetaminophen (TYLENOL) suppository 650 mg, Q6H PRN        Data:     Code Status:  Full Code    No family history on file. Social History     Socioeconomic History    Marital status:      Spouse name: Not on file    Number of children: Not on file    Years of education: Not on file    Highest education level: Not on file   Occupational History    Not on file   Tobacco Use    Smoking status: Never    Smokeless tobacco: Never   Vaping Use    Vaping Use: Never used   Substance and Sexual Activity    Alcohol use: No    Drug use: No    Sexual activity: Not on file   Other Topics Concern    Not on file   Social History Narrative    Not on file     Social Determinants of Health     Financial Resource Strain: Not on file   Food Insecurity: Not on file   Transportation Needs: Not on file   Physical Activity: Not on file   Stress: Not on file   Social Connections: Not on file   Intimate Partner Violence: Not on file   Housing Stability: Not on file       Vitals:  /65   Pulse 88   Temp 98.4 °F (36.9 °C) (Oral)   Resp 18   Ht 5' 9\" (1.753 m)   Wt 145 lb (65.8 kg)   SpO2 96%   BMI 21.41 kg/m²   Temp (24hrs), Av.6 °F (37 °C), Min:97.5 °F (36.4 °C), Max:99.7 °F (37.6 °C)    No results for input(s): POCGLU in the last 72 hours. I/O (24Hr):     Intake/Output Summary (Last 24 hours) at 2022 1021  Last data filed at 2022 0549  Gross per 24 hour   Intake --   Output 4475 ml   Net -4475 ml Labs:      CBC:   Lab Results   Component Value Date/Time    WBC 8.8 12/13/2022 05:13 AM    RBC 4.24 12/13/2022 05:13 AM    HGB 11.9 12/13/2022 05:13 AM    HCT 38.0 12/13/2022 05:13 AM    MCV 89.6 12/13/2022 05:13 AM    MCH 28.1 12/13/2022 05:13 AM    MCHC 31.3 12/13/2022 05:13 AM    RDW 13.5 12/13/2022 05:13 AM     12/13/2022 05:13 AM    MPV 10.6 12/13/2022 05:13 AM     CBC with Differential:    Lab Results   Component Value Date/Time    WBC 8.8 12/13/2022 05:13 AM    RBC 4.24 12/13/2022 05:13 AM    HGB 11.9 12/13/2022 05:13 AM    HCT 38.0 12/13/2022 05:13 AM     12/13/2022 05:13 AM    MCV 89.6 12/13/2022 05:13 AM    MCH 28.1 12/13/2022 05:13 AM    MCHC 31.3 12/13/2022 05:13 AM    RDW 13.5 12/13/2022 05:13 AM    LYMPHOPCT 12 12/13/2022 05:13 AM    MONOPCT 14 12/13/2022 05:13 AM    BASOPCT 0 12/13/2022 05:13 AM    MONOSABS 1.26 12/13/2022 05:13 AM    LYMPHSABS 1.06 12/13/2022 05:13 AM    EOSABS 0.07 12/13/2022 05:13 AM    BASOSABS 0.03 12/13/2022 05:13 AM    DIFFTYPE NOT REPORTED 03/03/2017 10:59 PM     Hemoglobin/Hematocrit:    Lab Results   Component Value Date/Time    HGB 11.9 12/13/2022 05:13 AM    HCT 38.0 12/13/2022 05:13 AM     CMP:    Lab Results   Component Value Date/Time     12/13/2022 05:13 AM    K 3.5 12/13/2022 05:13 AM     12/13/2022 05:13 AM    CO2 26 12/13/2022 05:13 AM    BUN 4 12/13/2022 05:13 AM    CREATININE 0.73 12/13/2022 05:13 AM    GFRAA >60 10/12/2021 07:50 AM    LABGLOM >60 12/13/2022 05:13 AM    GLUCOSE 116 12/13/2022 05:13 AM    PROT 5.2 12/13/2022 05:13 AM    LABALBU 2.4 12/13/2022 05:13 AM    CALCIUM 8.1 12/13/2022 05:13 AM    BILITOT 0.2 12/13/2022 05:13 AM    ALKPHOS 72 12/13/2022 05:13 AM    AST 21 12/13/2022 05:13 AM    ALT 11 12/13/2022 05:13 AM     BMP:    Lab Results   Component Value Date/Time     12/13/2022 05:13 AM    K 3.5 12/13/2022 05:13 AM     12/13/2022 05:13 AM    CO2 26 12/13/2022 05:13 AM    BUN 4 12/13/2022 05:13 AM LABALBU 2.4 12/13/2022 05:13 AM    CREATININE 0.73 12/13/2022 05:13 AM    CALCIUM 8.1 12/13/2022 05:13 AM    GFRAA >60 10/12/2021 07:50 AM    LABGLOM >60 12/13/2022 05:13 AM    GLUCOSE 116 12/13/2022 05:13 AM     PT/INR:    Lab Results   Component Value Date/Time    PROTIME 15.1 12/10/2022 05:23 AM    INR 1.2 12/10/2022 05:23 AM     PTT:    Lab Results   Component Value Date/Time    APTT 30.3 12/09/2022 09:10 PM   [APTT}    Physical Examination:        General appearance: alert, cooperative and no distress  Mental Status: oriented to person, place and time and normal affect  Abdomen: soft, tender, nondistended, bowel sounds present   Extremities: no edema, redness or tenderness in the calves  Skin: no gross lesions, rashes, or induration    Assessment:        Primary Problem  Other ascites     Active Hospital Problems    Diagnosis Date Noted    Severe malnutrition ([facility] [address] [E43] 12/12/2022     Priority: Medium    Lower abdominal pain [R10.30] 12/10/2022     Priority: Medium    Diffuse abdominal pain [R10.84] 12/10/2022     Priority: Medium    Failure to thrive in adult [R62.7] 12/10/2022     Priority: Medium    Anorexia [R63.0] 12/10/2022     Priority: Medium    Other ascites [R18.8] 12/09/2022     Priority: Medium     Past Medical History:   Diagnosis Date    Bladder stone     CAD (coronary artery disease)     MI, Stents, CABG    Diverticulitis     GERD (gastroesophageal reflux disease)     Hyperlipidemia     Hypertension     Intermittent self-catheterization of bladder     LBBB (left bundle branch block) 3/4/2017    MI (myocardial infarction) (HCC)     Neuropathy     bilat toes    Polio     age 10    Wears glasses         Plan:        Abdominal pain, ?  Hx of cirrhosis, ascites on imaging  S/p paracentesis with 3 L removed  Ascitic fluid analysis pending; appears to be exudate, SAAG < 1.1  Will add AFB stain, bili to ascitic fluid  On Rocephin per ID  Liver disease workup in progress  2 gm sodium diet  Dysphagia, malnutrition  Passed swallow study  Continue ST recommendations    Time spent reviewing the chart, seeing the patient, and discussing with the attending MD around 30 minutes.     Explained to the patient and d/W Nursing Staff  Will F/U with you  Please call or Page for any issues or change in status  Thanks    Electronically signed by MAJO Ambriz NP on 12/13/2022 at 10:21 AM

## 2022-12-13 NOTE — CONSULTS
[facility]  Urology Consultation    Patient:  Alvin Kumar  MRN: [id number]  YOB: 1940    CHIEF COMPLAINT: Urinary retention    HISTORY OF PRESENT ILLNESS:   The patient is a 81 y.o. male who presents with high postvoid residual, Albright catheter inserted, the patient has a history of chronic high postvoid residual and neurogenic bladder, he was seen in consultation for evaluation and management regarding poor bladder emptying catheter was inserted the urine is clear the patient is tolerating the catheter well. Discussed status with the patient and with the patient's wife at bedside    Patient's old records, notes and chart reviewed and summarized above.     Past Medical History:    Past Medical History:   Diagnosis Date    Bladder stone     CAD (coronary artery disease)     MI, Stents, CABG    Diverticulitis     GERD (gastroesophageal reflux disease)     Hyperlipidemia     Hypertension     Intermittent self-catheterization of bladder     LBBB (left bundle branch block) 3/4/2017    MI (myocardial infarction) ([facility])     Neuropathy     bilat toes    Polio     age 10    Wears glasses        Past Surgical History:    Past Surgical History:   Procedure Laterality Date    BLADDER STONE REMOVAL      CARDIAC SURGERY  2000    CABG    COLONOSCOPY      CORONARY ANGIOPLASTY WITH STENT PLACEMENT      4 stents    CORONARY ARTERY BYPASS GRAFT  2000    triple vessel    ENDOSCOPY, COLON, DIAGNOSTIC       Previous  surgery: none     Medications:    Scheduled Meds:   famotidine  40 mg Oral BID    rosuvastatin  10 mg Oral Daily    pantoprazole  40 mg Oral QAM AC    cefTRIAXone (ROCEPHIN) IV  1,000 mg IntraVENous Q24H    amitriptyline  25 mg Oral Nightly    clopidogrel  75 mg Oral Daily    donepezil  10 mg Oral BID    finasteride  5 mg Oral Daily    magnesium oxide  400 mg Oral Daily    melatonin  9 mg Oral Nightly    pregabalin  100 mg Oral BID    sucralfate  1 g Oral 4x Daily    enoxaparin  40 mg SubCUTAneous Daily    sodium chloride flush  5-40 mL IntraVENous 2 times per day    sodium chloride flush  5-40 mL IntraVENous 2 times per day     Continuous Infusions:   dextrose 5 % and 0.45 % NaCl 75 mL/hr at 12/13/22 0247    sodium chloride 10 mL/hr at 12/13/22 0636     PRN Meds:.potassium chloride, nitroGLYCERIN, sodium chloride flush, sodium chloride flush, sodium chloride, magnesium sulfate, ondansetron **OR** ondansetron, polyethylene glycol, acetaminophen **OR** acetaminophen    Allergies:  Ciprofloxacin, Codeine, Morphine, Pletal [cilostazol], Sulfamethoxazole-trimethoprim, and Pcn [penicillins]    Social History:    Social History     Socioeconomic History    Marital status:      Spouse name: Not on file    Number of children: Not on file    Years of education: Not on file    Highest education level: Not on file   Occupational History    Not on file   Tobacco Use    Smoking status: Never    Smokeless tobacco: Never   Vaping Use    Vaping Use: Never used   Substance and Sexual Activity    Alcohol use: No    Drug use: No    Sexual activity: Not on file   Other Topics Concern    Not on file   Social History Narrative    Not on file     Social Determinants of Health     Financial Resource Strain: Not on file   Food Insecurity: Not on file   Transportation Needs: Not on file   Physical Activity: Not on file   Stress: Not on file   Social Connections: Not on file   Intimate Partner Violence: Not on file   Housing Stability: Not on file     Patient has had cystoscopy previously but no surgery to the prostate he does have neurogenic bladder and high postvoid residual  Family History:  No family history on file.   Previous Urologic Family history: none    REVIEW OF SYSTEMS:  Constitutional: negative  Eyes: negative  Respiratory: negative  Cardiovascular: negative  Gastrointestinal: See history of present illness  Genitourinary: see HPI  Musculoskeletal: Weakness  Skin: negative   Neurological: negative  Hematological/Lymphatic: negative  Psychological: negative    Physical Exam:    This a 81 y.o. male   Patient Vitals for the past 24 hrs:   BP Temp Temp src Pulse Resp SpO2 Height   12/13/22 0500 101/66 97.5 °F (36.4 °C) Oral 87 16 93 % --   12/13/22 0119 120/86 -- -- 69 16 94 % --   12/12/22 2027 114/77 99.5 °F (37.5 °C) Oral 75 18 91 % --   12/12/22 1839 -- -- -- -- -- -- 5' 9\" (1.753 m)   12/12/22 1543 101/61 99.7 °F (37.6 °C) -- 75 14 94 % --   12/12/22 1430 112/79 -- -- 77 -- -- --   12/12/22 1400 99/71 -- -- 78 16 -- --   12/12/22 1118 109/68 97.9 °F (36.6 °C) -- 91 14 93 % --   12/12/22 1017 115/68 -- -- 79 -- -- --   12/12/22 0825 (!) 147/118 98.2 °F (36.8 °C) -- 80 14 92 % --   12/12/22 0824 -- -- -- 62 -- 95 % --     Constitutional: Patient in no acute distress; Neuro: alert and oriented to person place and time. Psych: Mood and affect normal.  Skin: Normal  Lungs: Respiratory effort normal  Cardiovascular:  Normal peripheral pulses  Abdomen: Soft, non-tender, non-distended with no CVA, flank pain, hepatosplenomegaly or hernia. Kidneys normal.  Bladder non-tender and not distended.   Lymphatics: no palpable lymphadenopathy  Penis normal and circumcised  Urethral meatus normal  Scrotal exam normal  Testicles normal bilaterally  Epididymis normal bilaterally  Albright catheter in place bladder decompressed  LABS:  Recent Labs     12/11/22  0507 12/12/22  0511 12/13/22  0513   WBC 10.8 10.4 8.8   HGB 11.3* 11.3* 11.9*   HCT 36.9* 36.0* 38.0*   MCV 90.9 90.0 89.6    323 314     Recent Labs     12/11/22  0507 12/12/22  0511 12/13/22  0513    137 137   K 3.5* 3.8 3.5*    104 102   CO2 26 25 26   BUN 9 6* 4*   CREATININE 0.86 0.80 0.73     No results found for: PSA    Additional Lab/culture results:    Urinalysis: No results for input(s): COLORU, PHUR, LABCAST, WBCUA, RBCUA, MUCUS, TRICHOMONAS, YEAST, BACTERIA, CLARITYU, SPECGRAV, LEUKOCYTESUR, UROBILINOGEN, Shelda Allouez in the last 72 hours. Invalid input(s): NITRATE, GLUCOSEUKETONESUAMORPHOUS     -----------------------------------------------------------------  Imaging Results: Moderate ascites with associated moderate diffuse edema within the mesentery. Severe diverticulosis involving the left colon without evidence of acute   diverticulitis. No abscess or perforation. Numerous bladder diverticula. Laminectomy at L4 and L5.        Bilateral gynecomastia       Assessment and Plan   Impression: Acute urinary retention neurogenic bladder, enlarged prostate  Patient Active Problem List   Diagnosis    Neuropathy of both feet    LBBB (left bundle branch block)    S/P CABG (coronary artery bypass graft)    Unstable angina (HCC)    S/P cardiac cath    S/P ICD (internal cardiac defibrillator) procedure    Other ascites    Lower abdominal pain    Diffuse abdominal pain    Failure to thrive in adult    Anorexia    Severe malnutrition ([facility] [address]       Plan: Maintain indwelling Albright    Lorelei Sabillon MD  7:08 AM 12/13/2022

## 2022-12-13 NOTE — PROGRESS NOTES
[address] Hospitalist        12/13/2022   4:39 PM    Name:  Kandace Kern  MRN:    [id number]     Acct:     [de-identified]   Room:  0/18-0  IP Day: 4     Admit Date: 12/9/2022  5:11 PM  PCP: Radha Reilly DO    C/C:   Chief Complaint   Patient presents with    Abdominal Pain     Loss of appetite       Assessment:      Lower abdominal pain  Unlikely spontaneous bacterial peritonitis  Questionable history of liver cirrhosis  Ascites  Failure to thrive  Diverticulosis  Coronary artery disease, native vessel  Status post CABG  Left bundle branch block  Benign prostatic hypertrophy  Mixed hyperlipidemia  Major depressive disorder  Dementia  Gastroesophageal reflux disease without esophagitis  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  Polio at age 10    Plan:      Patient is admitted to progressive unit  Oxygen to keep SPO2 more than 90%  Telemetry  Check vital signs closely  CBC BMP daily  Blood culture  Gastroenterology is involved, they are suspecting spontaneous bacterial peritonitis, patient is continued on IV antibiotics IV Rocephin, further they will follow-up fluid cultures from ascitic fluid and they are planning for EGD colonoscopy as an outpatient patient is status post paracentesis with 3 L removal fluid on 12/12/2022  Urology consult  Continue Plavix  Continue Crestor  Infectious disease have evaluated patient, they recommended patient likely does not have spontaneous bacterial peritonitis, for now they have continued patient on IV Rocephin  Continue other medication as below  Hopefully discharge planning in next 24 to 48 hours if okay with everyone and if continues to do well    Scheduled Meds:   famotidine  40 mg Oral BID    rosuvastatin  10 mg Oral Daily    pantoprazole  40 mg Oral QAM AC    cefTRIAXone (ROCEPHIN) IV  1,000 mg IntraVENous Q24H    amitriptyline  25 mg Oral Nightly    clopidogrel  75 mg Oral Daily    donepezil  10 mg Oral BID    finasteride  5 mg Oral Daily    magnesium oxide  400 mg Oral Daily    melatonin  9 mg Oral Nightly    pregabalin  100 mg Oral BID    sucralfate  1 g Oral 4x Daily    enoxaparin  40 mg SubCUTAneous Daily    sodium chloride flush  5-40 mL IntraVENous 2 times per day    sodium chloride flush  5-40 mL IntraVENous 2 times per day     Continuous Infusions:   dextrose 5 % and 0.45 % NaCl 75 mL/hr at 22 1525    sodium chloride 10 mL/hr at 22 0636     PRN Meds:  potassium chloride, 10 mEq, PRN  nitroGLYCERIN, 0.4 mg, Q5 Min PRN  sodium chloride flush, 5-40 mL, PRN  sodium chloride flush, 10 mL, PRN  sodium chloride, , PRN  magnesium sulfate, 1,000 mg, PRN  ondansetron, 4 mg, Q8H PRN   Or  ondansetron, 4 mg, Q6H PRN  polyethylene glycol, 17 g, Daily PRN  acetaminophen, 650 mg, Q6H PRN   Or  acetaminophen, 650 mg, Q6H PRN          Subjective:     Patient seen and examined at bedside and reviewed  last 24 hrs events with nursing staff  No acute events overnight. Patient denies any acute complaints. Afebrile  Patient denies any chest pain, shortness of Breath, palpitation, headache, dizziness, cough, nausea, vomiting, abdominal pain, pain, changes in urination or bowel habit or rash. ROS:  A 10 point system reviewed and negative otherwise mentioned above. Physical Examination:      Vitals:  /60   Pulse 73   Temp 99.5 °F (37.5 °C) (Oral)   Resp 22   Ht 5' 9\" (1.753 m)   Wt 145 lb (65.8 kg)   SpO2 93%   BMI 21.41 kg/m²   Temp (24hrs), Av.7 °F (37.1 °C), Min:97.5 °F (36.4 °C), Max:99.5 °F (37.5 °C)    Weight:   Wt Readings from Last 3 Encounters:   22 145 lb (65.8 kg)   10/11/21 155 lb (70.3 kg)   20 150 lb (68 kg)     I/O last 3 completed shifts:  I/O last 3 completed shifts:  In: -   Out: 2197 [Urine:2225; Other:3100]     No results for input(s): POCGLU in the last 72 hours.       General appearance - alert, well appearing, and in no acute distress  Mental status - oriented to person, place, and time with normal affect  Head - normocephalic and atraumatic  Eyes - pupils equal and reactive, extraocular eye movements intact, conjunctiva clear  Ears - hearing appears to be intact  Nose - no drainage noted  Mouth - mucous membranes moist  Neck - supple, no carotid bruits, thyroid not palpable  Chest - clear to auscultation, normal effort  Heart - normal rate, regular rhythm, no murmur  Abdomen - soft, diffuse abdominal tenderness, distended, bowel sounds present all four quadrants, no masses, hepatomegaly or splenomegaly  Neurological - normal speech, no focal findings or movement disorder noted, cranial nerves II through XII grossly intact  Extremities - peripheral pulses palpable, no pedal edema or calf pain with palpation  Skin - no gross lesions, rashes, or induration noted        Medications: Allergies:    Allergies   Allergen Reactions    Ciprofloxacin Other (See Comments)     Nerve pain exacerbation in feet    Codeine Other (See Comments)     insomia & tachycardia    Morphine      Pt states he has problems with his bp when given morphine    Pletal [Cilostazol]     Sulfamethoxazole-Trimethoprim     Pcn [Penicillins] Rash       Current Meds:   Current Facility-Administered Medications:     famotidine (PEPCID) tablet 40 mg, 40 mg, Oral, BID, Elijah Loya MD, 40 mg at 12/13/22 0826    rosuvastatin (CRESTOR) tablet 10 mg, 10 mg, Oral, Daily, Elijah Loya MD, 10 mg at 12/13/22 0826    pantoprazole (PROTONIX) tablet 40 mg, 40 mg, Oral, QAM AC, Elijah Loya MD, 40 mg at 12/13/22 0610    cefTRIAXone (ROCEPHIN) 1,000 mg in dextrose 5 % 50 mL IVPB mini-bag, 1,000 mg, IntraVENous, Q24H, Mahesh Shook MD, Stopped at 12/12/22 2209    dextrose 5 % and 0.45 % sodium chloride infusion, , IntraVENous, Continuous, Abdirizak Bonilla MD, Last Rate: 75 mL/hr at 12/13/22 1525, New Bag at 12/13/22 1525    potassium chloride 10 mEq/100 mL IVPB (Peripheral Line), 10 mEq, IntraVENous, PRN, Abdirizak Bonilla MD, Last Rate: 100 mL/hr at 12/13/22 1201, 10 mEq at 12/13/22 1201    amitriptyline (ELAVIL) tablet 25 mg, 25 mg, Oral, Nightly, Abdirizak Bonilla MD, 25 mg at 12/12/22 2000    clopidogrel (PLAVIX) tablet 75 mg, 75 mg, Oral, Daily, Abdirizak Bonilla MD, 75 mg at 12/13/22 0826    donepezil (ARICEPT) tablet 10 mg, 10 mg, Oral, BID, Abdirizak Bonilla MD, 10 mg at 12/13/22 0826    finasteride (PROSCAR) tablet 5 mg, 5 mg, Oral, Daily, Abdirizak Bonilla MD, 5 mg at 12/13/22 0826    magnesium oxide (MAG-OX) tablet 400 mg, 400 mg, Oral, Daily, Abdirizak Bonilla MD, 400 mg at 12/13/22 0826    melatonin tablet 9 mg, 9 mg, Oral, Nightly, Abdirizak Bonilla MD, 9 mg at 12/12/22 2000    nitroGLYCERIN (NITROSTAT) SL tablet 0.4 mg, 0.4 mg, SubLINGual, Q5 Min PRN, Abdirizak Bonilla MD    pregabalin (LYRICA) capsule 100 mg, 100 mg, Oral, BID, Erin Soto MD, 100 mg at 12/13/22 0828    sucralfate (CARAFATE) tablet 1 g, 1 g, Oral, 4x Daily, Erin Soto MD, 1 g at 12/13/22 1239    enoxaparin (LOVENOX) injection 40 mg, 40 mg, SubCUTAneous, Daily, Abdirizak Bonilla MD, 40 mg at 12/13/22 8536    sodium chloride flush 0.9 % injection 5-40 mL, 5-40 mL, IntraVENous, 2 times per day, Ben Dodson MD    sodium chloride flush 0.9 % injection 5-40 mL, 5-40 mL, IntraVENous, PRN, Ben Dodson MD    sodium chloride flush 0.9 % injection 5-40 mL, 5-40 mL, IntraVENous, 2 times per day, Cary Rebollar, APRN - CNP, 10 mL at 12/13/22 0831    sodium chloride flush 0.9 % injection 10 mL, 10 mL, IntraVENous, PRN, Cary A Lump, APRN - CNP    0.9 % sodium chloride infusion, , IntraVENous, PRN, Angela Amanda Lump, APRN - CNP, Last Rate: 10 mL/hr at 12/13/22 0636, New Bag at 12/13/22 0636    magnesium sulfate 1000 mg in dextrose 5% 100 mL IVPB, 1,000 mg, IntraVENous, PRN, Cary A Lump, APRN - CNP    ondansetron (ZOFRAN-ODT) disintegrating tablet 4 mg, 4 mg, Oral, Q8H PRN **OR** ondansetron (ZOFRAN) injection 4 mg, 4 mg, IntraVENous, Q6H PRN, Cary A Lump, APRN - CNP, 4 mg at 12/13/22 0867    polyethylene glycol (GLYCOLAX) packet 17 g, 17 g, Oral, Daily PRN, Cary A Lump, APRN - CNP    acetaminophen (TYLENOL) tablet 650 mg, 650 mg, Oral, Q6H PRN **OR** acetaminophen (TYLENOL) suppository 650 mg, 650 mg, Rectal, Q6H PRN, Cary A Lump, APRN - CNP      I/O (24Hr): Intake/Output Summary (Last 24 hours) at 12/13/2022 1639  Last data filed at 12/13/2022 1242  Gross per 24 hour   Intake --   Output 1950 ml   Net -1950 ml         Data:           Labs:    Hematology:  Recent Labs     12/11/22  0507 12/12/22  0511 12/13/22  0513   WBC 10.8 10.4 8.8   RBC 4.06* 4.00* 4.24   HGB 11.3* 11.3* 11.9*   HCT 36.9* 36.0* 38.0*   MCV 90.9 90.0 89.6   MCH 27.8 28.3 28.1   MCHC 30.6 31.4 31.3   RDW 13.5 13.4 13.5    323 314   MPV 10.9 10.5 10.6       Chemistry:  Recent Labs     12/11/22  0507 12/12/22  0511 12/13/22  0513    137 137   K 3.5* 3.8 3.5*    104 102   CO2 26 25 26   GLUCOSE 128* 118* 116*   BUN 9 6* 4*   CREATININE 0.86 0.80 0.73   MG 1.9  --  1.7   ANIONGAP 9 8* 9   LABGLOM >60 >60 >60   CALCIUM 8.2* 8.1* 8.1*       Recent Labs     12/11/22  0507 12/12/22  0511 12/13/22  0513   PROT 5.5* 5.3* 5.2*   LABALBU 2.6* 2.5* 2.4*   AST 22 27 21   ALT 10 12 11   ALKPHOS 65 71 72   BILITOT 0.3 0.3 0.2*         Lab Results   Component Value Date/Time    SPECIAL NOT REPORTED 02/18/2020 10:58 PM     Lab Results   Component Value Date/Time    CULTURE NO GROWTH 11 HOURS 12/12/2022 02:00 PM       No results found for: POCPH, PHART, PH, POCPCO2, DOR5YOE, PCO2, POCPO2, PO2ART, PO2, POCHCO3, CIZ0KLC, HCO3, NBEA, PBEA, BEART, BE, THGBART, THB, ASD7DTW, LYOI7XJN, T5JDGADV, O2SAT, FIO2    Radiology:    CT ABDOMEN PELVIS W IV CONTRAST Additional Contrast? None    Result Date: 12/9/2022  Moderate ascites with associated moderate diffuse edema within the mesentery. Severe diverticulosis involving the left colon without evidence of acute diverticulitis. No abscess or perforation. Numerous bladder diverticula. Laminectomy at L4 and L5. Bilateral gynecomastia. FL MODIFIED BARIUM SWALLOW W VIDEO    Result Date: 12/10/2022  1. 1 instance of flash penetration without aspiration with the pureed/pudding thick substance. 2. No penetration or aspiration with the remainder of the administered substances/administrations. Please see separate speech pathology report for full discussion of findings and recommendations. All radiological studies reviewed  Code Status:  Full Code        Electronically signed by Claudette Serra MD on 12/13/2022 at 4:39 PM    This note was created with the assistance of a speech-recognition program.  Although the intention is to generate a document that actually reflects the content of the visit, no guarantees can be provided that every mistake has been identified and corrected by editing. Note was updated later by me after  physical examination and  completion of the assessment.

## 2022-12-13 NOTE — PLAN OF CARE
Patient slept well during night, tolerating ATB well, patient labs reviewed and replacements started. No s/s of breakdown or injuries. Problem: Discharge Planning  Goal: Discharge to home or other facility with appropriate resources  Outcome: Progressing  Flowsheets (Taken 12/12/2022 2000)  Discharge to home or other facility with appropriate resources: Identify barriers to discharge with patient and caregiver     Problem: Skin/Tissue Integrity  Goal: Absence of new skin breakdown  Description: 1. Monitor for areas of redness and/or skin breakdown  2. Assess vascular access sites hourly  3. Every 4-6 hours minimum:  Change oxygen saturation probe site  4. Every 4-6 hours:  If on nasal continuous positive airway pressure, respiratory therapy assess nares and determine need for appliance change or resting period.   Outcome: Progressing     Problem: Safety - Adult  Goal: Free from fall injury  Outcome: Progressing     Problem: Genitourinary - Adult  Goal: Absence of urinary retention  Outcome: Progressing

## 2022-12-13 NOTE — PLAN OF CARE
Pt remained safe and free from falls throughout shift. Up in chair a few times today. Potassium replaced. D5% and 0.45% NaCl @ 75ml/hr. Culture body fluid was messaged to GI so they are aware. Wife and daughter have been at bedside. Zofran given d/t nausea. Bed alarm in, bed in lowest position, call light in reach. Will continue to monitor. Problem: Discharge Planning  Goal: Discharge to home or other facility with appropriate resources  12/13/2022 1559 by Alex Conde RN  Outcome: Progressing  Flowsheets (Taken 12/13/2022 1559)  Discharge to home or other facility with appropriate resources:   Identify barriers to discharge with patient and caregiver   Arrange for needed discharge resources and transportation as appropriate     Problem: Skin/Tissue Integrity  Goal: Absence of new skin breakdown  Description: 1. Monitor for areas of redness and/or skin breakdown  2. Assess vascular access sites hourly  3. Every 4-6 hours minimum:  Change oxygen saturation probe site  4. Every 4-6 hours:  If on nasal continuous positive airway pressure, respiratory therapy assess nares and determine need for appliance change or resting period.   12/13/2022 1559 by Alex Conde RN  Outcome: Progressing     Problem: Safety - Adult  Goal: Free from fall injury  12/13/2022 1559 by Alex Conde RN  Outcome: Progressing  Flowsheets (Taken 12/13/2022 1559)  Free From Fall Injury: Instruct family/caregiver on patient safety     Problem: ABCDS Injury Assessment  Goal: Absence of physical injury  12/13/2022 1559 by Alex Conde RN  Outcome: Progressing  Flowsheets (Taken 12/13/2022 1559)  Absence of Physical Injury: Implement safety measures based on patient assessment     Problem: Genitourinary - Adult  Goal: Absence of urinary retention  12/13/2022 1559 by Alex Conde RN  Outcome: Progressing  Flowsheets (Taken 12/13/2022 1559)  Absence of urinary retention:   Assess patients ability to void and empty bladder   Monitor intake/output and perform bladder scan as needed     Problem: Genitourinary - Adult  Goal: Urinary catheter remains patent  12/13/2022 1559 by Yadira La RN  Outcome: Progressing  Flowsheets (Taken 12/13/2022 1559)  Urinary catheter remains patent: Assess patency of urinary catheter     Problem: Nutrition Deficit:  Goal: Optimize nutritional status  12/13/2022 1559 by Yadira La RN  Outcome: Progressing  Flowsheets (Taken 12/13/2022 1559)  Nutrient intake appropriate for improving, restoring, or maintaining nutritional needs:   Assess nutritional status and recommend course of action   Monitor oral intake, labs, and treatment plans   Recommend appropriate diets, oral nutritional supplements, and vitamin/mineral supplements

## 2022-12-14 ENCOUNTER — APPOINTMENT (OUTPATIENT)
Dept: MRI IMAGING | Age: 82
DRG: 374 | End: 2022-12-14
Payer: MEDICARE

## 2022-12-14 LAB
ABSOLUTE EOS #: 0.12 K/UL (ref 0–0.44)
ABSOLUTE IMMATURE GRANULOCYTE: 0.05 K/UL (ref 0–0.3)
ABSOLUTE LYMPH #: 0.98 K/UL (ref 1.1–3.7)
ABSOLUTE MONO #: 1.29 K/UL (ref 0.1–1.2)
AFP: 3.4 UG/L
ALBUMIN SERPL-MCNC: 2.5 G/DL (ref 3.5–5.2)
ALP BLD-CCNC: 79 U/L (ref 40–129)
ALT SERPL-CCNC: 16 U/L (ref 5–41)
AMYLASE FLUID: 25 U/L
ANION GAP SERPL CALCULATED.3IONS-SCNC: 8 MMOL/L (ref 9–17)
AST SERPL-CCNC: 34 U/L
BASOPHILS # BLD: 0 % (ref 0–2)
BASOPHILS ABSOLUTE: 0.04 K/UL (ref 0–0.2)
BILIRUB SERPL-MCNC: 0.2 MG/DL (ref 0.3–1.2)
BUN BLDV-MCNC: 4 MG/DL (ref 8–23)
BUN/CREAT BLD: 6 (ref 9–20)
CA 19-9: 11 U/ML (ref 0–35)
CALCIUM SERPL-MCNC: 8.2 MG/DL (ref 8.6–10.4)
CARCINOEMBRYONIC ANTIGEN: 1.5 NG/ML
CHLORIDE BLD-SCNC: 102 MMOL/L (ref 98–107)
CO2: 27 MMOL/L (ref 20–31)
CREAT SERPL-MCNC: 0.72 MG/DL (ref 0.7–1.2)
DIRECT EXAM: ABNORMAL
DIRECT EXAM: ABNORMAL
EOSINOPHILS RELATIVE PERCENT: 1 % (ref 1–4)
GFR SERPL CREATININE-BSD FRML MDRD: >60 ML/MIN/1.73M2
GLUCOSE BLD-MCNC: 119 MG/DL (ref 70–99)
GLUCOSE, FLUID: 120 MG/DL
HCT VFR BLD CALC: 38.3 % (ref 40.7–50.3)
HEMOGLOBIN: 12 G/DL (ref 13–17)
IMMATURE GRANULOCYTES: 1 %
LACTATE DEHYDROGENASE, FLUID: 182 U/L
LYMPHOCYTES # BLD: 11 % (ref 24–43)
MCH RBC QN AUTO: 28 PG (ref 25.2–33.5)
MCHC RBC AUTO-ENTMCNC: 31.3 G/DL (ref 28.4–34.8)
MCV RBC AUTO: 89.3 FL (ref 82.6–102.9)
MONOCYTES # BLD: 14 % (ref 3–12)
NRBC AUTOMATED: 0 PER 100 WBC
PDW BLD-RTO: 13.4 % (ref 11.8–14.4)
PLATELET # BLD: 325 K/UL (ref 138–453)
PMV BLD AUTO: 10.3 FL (ref 8.1–13.5)
POTASSIUM SERPL-SCNC: 3.6 MMOL/L (ref 3.7–5.3)
PROSTATE SPECIFIC ANTIGEN: 0.7 NG/ML
RBC # BLD: 4.29 M/UL (ref 4.21–5.77)
SEG NEUTROPHILS: 73 % (ref 36–65)
SEGMENTED NEUTROPHILS ABSOLUTE COUNT: 6.61 K/UL (ref 1.5–8.1)
SODIUM BLD-SCNC: 137 MMOL/L (ref 135–144)
SPECIMEN DESCRIPTION: ABNORMAL
SPECIMEN TYPE: NORMAL
SURGICAL PATHOLOGY REPORT: NORMAL
TOTAL PROTEIN, BODY FLUID: 3.2 G/DL
TOTAL PROTEIN: 5.3 G/DL (ref 6.4–8.3)
WBC # BLD: 9.1 K/UL (ref 3.5–11.3)

## 2022-12-14 PROCEDURE — 97530 THERAPEUTIC ACTIVITIES: CPT

## 2022-12-14 PROCEDURE — 85025 COMPLETE CBC W/AUTO DIFF WBC: CPT

## 2022-12-14 PROCEDURE — 97112 NEUROMUSCULAR REEDUCATION: CPT

## 2022-12-14 PROCEDURE — 80053 COMPREHEN METABOLIC PANEL: CPT

## 2022-12-14 PROCEDURE — 84153 ASSAY OF PSA TOTAL: CPT

## 2022-12-14 PROCEDURE — A9579 GAD-BASE MR CONTRAST NOS,1ML: HCPCS | Performed by: NURSE PRACTITIONER

## 2022-12-14 PROCEDURE — 99223 1ST HOSP IP/OBS HIGH 75: CPT | Performed by: INTERNAL MEDICINE

## 2022-12-14 PROCEDURE — 6360000002 HC RX W HCPCS: Performed by: FAMILY MEDICINE

## 2022-12-14 PROCEDURE — 2580000003 HC RX 258: Performed by: NURSE PRACTITIONER

## 2022-12-14 PROCEDURE — APPSS30 APP SPLIT SHARED TIME 16-30 MINUTES: Performed by: NURSE PRACTITIONER

## 2022-12-14 PROCEDURE — 6360000002 HC RX W HCPCS: Performed by: NURSE PRACTITIONER

## 2022-12-14 PROCEDURE — 82105 ALPHA-FETOPROTEIN SERUM: CPT

## 2022-12-14 PROCEDURE — 86301 IMMUNOASSAY TUMOR CA 19-9: CPT

## 2022-12-14 PROCEDURE — 2060000000 HC ICU INTERMEDIATE R&B

## 2022-12-14 PROCEDURE — 2580000003 HC RX 258: Performed by: FAMILY MEDICINE

## 2022-12-14 PROCEDURE — 74183 MRI ABD W/O CNTR FLWD CNTR: CPT

## 2022-12-14 PROCEDURE — 36415 COLL VENOUS BLD VENIPUNCTURE: CPT

## 2022-12-14 PROCEDURE — 86334 IMMUNOFIX E-PHORESIS SERUM: CPT

## 2022-12-14 PROCEDURE — 99232 SBSQ HOSP IP/OBS MODERATE 35: CPT | Performed by: INTERNAL MEDICINE

## 2022-12-14 PROCEDURE — 6370000000 HC RX 637 (ALT 250 FOR IP): Performed by: FAMILY MEDICINE

## 2022-12-14 PROCEDURE — 6360000004 HC RX CONTRAST MEDICATION: Performed by: NURSE PRACTITIONER

## 2022-12-14 PROCEDURE — 82378 CARCINOEMBRYONIC ANTIGEN: CPT

## 2022-12-14 RX ORDER — SODIUM CHLORIDE 0.9 % (FLUSH) 0.9 %
10 SYRINGE (ML) INJECTION ONCE
Status: COMPLETED | OUTPATIENT
Start: 2022-12-14 | End: 2022-12-14

## 2022-12-14 RX ORDER — 0.9 % SODIUM CHLORIDE 0.9 %
50 INTRAVENOUS SOLUTION INTRAVENOUS ONCE
Status: DISCONTINUED | OUTPATIENT
Start: 2022-12-14 | End: 2022-12-20 | Stop reason: HOSPADM

## 2022-12-14 RX ADMIN — SUCRALFATE 1 G: 1 TABLET ORAL at 08:29

## 2022-12-14 RX ADMIN — AMITRIPTYLINE HYDROCHLORIDE 25 MG: 25 TABLET, FILM COATED ORAL at 19:59

## 2022-12-14 RX ADMIN — DEXTROSE AND SODIUM CHLORIDE: 5; 450 INJECTION, SOLUTION INTRAVENOUS at 05:32

## 2022-12-14 RX ADMIN — DONEPEZIL HYDROCHLORIDE 10 MG: 10 TABLET, FILM COATED ORAL at 08:30

## 2022-12-14 RX ADMIN — GADOTERIDOL 13 ML: 279.3 INJECTION, SOLUTION INTRAVENOUS at 15:03

## 2022-12-14 RX ADMIN — FAMOTIDINE 40 MG: 20 TABLET, FILM COATED ORAL at 08:29

## 2022-12-14 RX ADMIN — ONDANSETRON 4 MG: 2 INJECTION INTRAMUSCULAR; INTRAVENOUS at 08:57

## 2022-12-14 RX ADMIN — PANTOPRAZOLE SODIUM 40 MG: 40 TABLET, DELAYED RELEASE ORAL at 05:18

## 2022-12-14 RX ADMIN — PREGABALIN 100 MG: 100 CAPSULE ORAL at 08:30

## 2022-12-14 RX ADMIN — DONEPEZIL HYDROCHLORIDE 10 MG: 10 TABLET, FILM COATED ORAL at 19:59

## 2022-12-14 RX ADMIN — Medication 400 MG: at 08:29

## 2022-12-14 RX ADMIN — ROSUVASTATIN CALCIUM 10 MG: 10 TABLET, FILM COATED ORAL at 08:29

## 2022-12-14 RX ADMIN — SODIUM CHLORIDE, PRESERVATIVE FREE 10 ML: 5 INJECTION INTRAVENOUS at 15:04

## 2022-12-14 RX ADMIN — Medication 9 MG: at 19:59

## 2022-12-14 RX ADMIN — ONDANSETRON 4 MG: 2 INJECTION INTRAMUSCULAR; INTRAVENOUS at 13:46

## 2022-12-14 RX ADMIN — PREGABALIN 100 MG: 100 CAPSULE ORAL at 19:59

## 2022-12-14 RX ADMIN — Medication 50 ML: at 15:04

## 2022-12-14 RX ADMIN — SUCRALFATE 1 G: 1 TABLET ORAL at 19:59

## 2022-12-14 RX ADMIN — ENOXAPARIN SODIUM 40 MG: 100 INJECTION SUBCUTANEOUS at 08:28

## 2022-12-14 RX ADMIN — FAMOTIDINE 40 MG: 20 TABLET, FILM COATED ORAL at 20:00

## 2022-12-14 RX ADMIN — FINASTERIDE 5 MG: 5 TABLET, FILM COATED ORAL at 08:28

## 2022-12-14 RX ADMIN — DEXTROSE AND SODIUM CHLORIDE: 5; 450 INJECTION, SOLUTION INTRAVENOUS at 18:59

## 2022-12-14 RX ADMIN — CLOPIDOGREL BISULFATE 75 MG: 75 TABLET ORAL at 08:29

## 2022-12-14 ASSESSMENT — ENCOUNTER SYMPTOMS
ABDOMINAL PAIN: 1
NAUSEA: 1
VOMITING: 0
RESPIRATORY NEGATIVE: 1

## 2022-12-14 NOTE — PROGRESS NOTES
Physical Therapy  Facility/Department: [facility]  Daily Treatment Note  NAME: Marianne Mason  : 1940  MRN: [id number]    Date of Service: 2022    Discharge Recommendations:  Patient would benefit from continued therapy after discharge        Patient Diagnosis(es): The primary encounter diagnosis was Lower abdominal pain. Diagnoses of Other ascites, Failure to thrive in adult, and Diverticulosis of colon were also pertinent to this visit. Assessment   Assessment: Patient not feeling like himself today with increased nausea and weakness. Mod-Max x 2 A for STS transfers and all mobility performed dependent within adrianna stedy, patient declines standing with RW today. Patient will require contined skilled PT services at this time. Activity Tolerance: Patient limited by pain; Patient limited by fatigue;Patient limited by endurance   AM-PAC Score: 9  Plan    Physcial Therapy Plan  General Plan: 5-7 times per week  Current Treatment Recommendations: Strengthening;ROM;Balance training;Functional mobility training;Transfer training;ADL/Self-care training;Cognitive/Perceptual training; Endurance training;Gait training;Neuromuscular re-education;Home exercise program;Safety education & training;Patient/Caregiver education & training;Positioning; Therapeutic activities     Restrictions  Restrictions/Precautions  Restrictions/Precautions: Fall Risk, Bed Alarm, General Precautions, Up as Tolerated  Required Braces or Orthoses?: No  Position Activity Restriction  Other position/activity restrictions: IV, telemetry     Subjective    Subjective  Subjective: Patient agreeable for PT Treatment. Wife present. Orientation  Overall Orientation Status: Within Functional Limits  Orientation Level: Oriented X4  Cognition  Overall Cognitive Status: Exceptions  Arousal/Alertness: Appropriate responses to stimuli;Delayed responses to stimuli  Following Commands: Follows one step commands with repetition; Follows one step commands with increased time; Inconsistently follows commands  Attention Span: Appears intact; Attends with cues to redirect; Difficulty attending to directions  Memory: Decreased short term memory;Decreased recall of recent events  Safety Judgement: Decreased awareness of need for assistance;Decreased awareness of need for safety  Problem Solving: Assistance required to generate solutions;Assistance required to implement solutions;Assistance required to identify errors made;Assistance required to correct errors made;Decreased awareness of errors  Insights: Decreased awareness of deficits  Initiation: Requires cues for all  Sequencing: Requires cues for all     Objective   Bed Mobility Training  Bed Mobility Training: Yes  Overall Level of Assistance: Moderate assistance;Maximum assistance;Assist X2  Interventions: Safety awareness training; Tactile cues; Verbal cues (Smooth alcantar'd significant difficulties getting to EOB, at once point he had to lay back because he was having too much pain in his ABD. Once at EOB, required increased assistance Mod-Max A to remained seated d/t posterior lean.)  Rolling: Moderate assistance;Maximum assistance;Assist X2  Supine to Sit: Moderate assistance;Maximum assistance;Assist X2  Sit to Supine:  (Patient remained up in recliner at end of treatment with daughter and wife present.)  Balance  Sitting: With support (Mod-Max A for seated balance at EOB)  Standing: With support (w/ Rollo Stuart stedy and Min-Mod x 2 A)  Transfer Training  Transfer Training: Yes  Overall Level of Assistance: Moderate assistance;Maximum assistance;Assist X2  Interventions: Safety awareness training; Tactile cues; Verbal cues (Patient required increased assistance for STS transfer in [address] [place] [address]. Transferred from EOB to recliner. Patient reports feeling nauseous and declined standing again with RW.)  Sit to Stand:  Moderate assistance;Maximum assistance;Assist X2  Stand to Sit: Moderate assistance;Maximum assistance;Assist X2  Stand Pivot Transfers: Total assistance  Bed to Chair: Total assistance  Gait Training  Gait Training: No (Patient not appropriate for gait today.)  Neuromuscular Education  Neuromuscular Education: Yes  Treatment: Sitting;Standing  Neuromuscular Comments: VCs req for proper breathing sharmila (pursed lip breathing) during functional mobility. Tactile and VCs req for postural control during sit<>stands & amb to promote abdominal and erector spinae mm facilitation for increased stability and balance, decreasing kyphosis of the spine. Pt req VCs to correct for forward WS with squatting in addition to pressing firmly into ground with feet, to promote the appropriate body mechanics for sit<>stand transfers. Safety Devices  Type of Devices: Gait belt;Patient at risk for falls;Nurse notified; All fall risk precautions in place; Left in chair  Restraints  Restraints Initially in Place: No       Goals  Short Term Goals  Time Frame for Short Term Goals: 12 visits  Short Term Goal 1: to be independent with all bed mobility  Short Term Goal 2: to be independent with all transfers  Short Term Goal 3: to require min A x 1 for ambulation up to 10'  Short Term Goal 4: to be able to participate in at lesat 25 min of PT    Education  Patient Education  Education Given To: Patient; Family  Education Provided: Role of Therapy;Plan of Care;Transfer Training;Energy Conservation; Fall Prevention Strategies  Education Method: Verbal  Barriers to Learning: Cognition; Hearing  Education Outcome: Verbalized understanding;Continued education needed    Therapy Time   Individual Concurrent Group Co-treatment   Time In       1143   Time Out       56   Minutes       15       Co-treatment with OT warranted secondary to decreased safety and independence requiring 2 skilled therapy professionals to address individual discipline's goals.  PT addressing pre gait trunk strengthening, weight shifting prior to transfers, transfer training, and postural control in sitting/standing, safety with adrianna perdmoo.      Jonah Pierre, PTA

## 2022-12-14 NOTE — PROGRESS NOTES
GI Progress notes    12/14/2022   11:11 AM    Name:  Chinedu Miner  MRN:    [id number]     Acct:     [de-identified]   Room:  [address] [place] [address] Day: 5     Admit Date: 12/9/2022  5:11 PM  PCP: Jael Vernon DO    Subjective:     C/C:   Chief Complaint   Patient presents with    Abdominal Pain     Loss of appetite       Interval History: Status: not changed. Patient seen and examined  No acute events overnight  Continues to have abdominal pain  No fevers chills  Minimal PO intake  States able to swallow solids and liquids without difficulty but does have productive cough, phlegm. Ascitic fluid analysis still ongoing. Exudative  PMN < 250 (On Rocephin)  Protein, culture, cytology pending      ROS:  Constitutional: negative for chills, fevers and sweats  Gastrointestinal: negative for constipation, diarrhea, nausea and vomiting  Neurological: negative for dizziness and headaches    Medications: Allergies:    Allergies   Allergen Reactions    Ciprofloxacin Other (See Comments)     Nerve pain exacerbation in feet    Codeine Other (See Comments)     insomia & tachycardia    Morphine      Pt states he has problems with his bp when given morphine    Pletal [Cilostazol]     Sulfamethoxazole-Trimethoprim     Pcn [Penicillins] Rash       Current Meds: famotidine (PEPCID) tablet 40 mg, BID  rosuvastatin (CRESTOR) tablet 10 mg, Daily  pantoprazole (PROTONIX) tablet 40 mg, QAM AC  cefTRIAXone (ROCEPHIN) 1,000 mg in dextrose 5 % 50 mL IVPB mini-bag, Q24H  dextrose 5 % and 0.45 % sodium chloride infusion, Continuous  potassium chloride 10 mEq/100 mL IVPB (Peripheral Line), PRN  amitriptyline (ELAVIL) tablet 25 mg, Nightly  donepezil (ARICEPT) tablet 10 mg, BID  finasteride (PROSCAR) tablet 5 mg, Daily  magnesium oxide (MAG-OX) tablet 400 mg, Daily  melatonin tablet 9 mg, Nightly  nitroGLYCERIN (NITROSTAT) SL tablet 0.4 mg, Q5 Min PRN  pregabalin (LYRICA) capsule 100 mg, BID  sucralfate (CARAFATE) tablet 1 g, 4x Daily  enoxaparin (LOVENOX) injection 40 mg, Daily  sodium chloride flush 0.9 % injection 5-40 mL, 2 times per day  sodium chloride flush 0.9 % injection 5-40 mL, PRN  sodium chloride flush 0.9 % injection 5-40 mL, 2 times per day  sodium chloride flush 0.9 % injection 10 mL, PRN  0.9 % sodium chloride infusion, PRN  magnesium sulfate 1000 mg in dextrose 5% 100 mL IVPB, PRN  ondansetron (ZOFRAN-ODT) disintegrating tablet 4 mg, Q8H PRN   Or  ondansetron (ZOFRAN) injection 4 mg, Q6H PRN  polyethylene glycol (GLYCOLAX) packet 17 g, Daily PRN  acetaminophen (TYLENOL) tablet 650 mg, Q6H PRN   Or  acetaminophen (TYLENOL) suppository 650 mg, Q6H PRN        Data:     Code Status:  Full Code    No family history on file. Social History     Socioeconomic History    Marital status:      Spouse name: Not on file    Number of children: Not on file    Years of education: Not on file    Highest education level: Not on file   Occupational History    Not on file   Tobacco Use    Smoking status: Never    Smokeless tobacco: Never   Vaping Use    Vaping Use: Never used   Substance and Sexual Activity    Alcohol use: No    Drug use: No    Sexual activity: Not on file   Other Topics Concern    Not on file   Social History Narrative    Not on file     Social Determinants of Health     Financial Resource Strain: Not on file   Food Insecurity: Not on file   Transportation Needs: Not on file   Physical Activity: Not on file   Stress: Not on file   Social Connections: Not on file   Intimate Partner Violence: Not on file   Housing Stability: Not on file       Vitals:  /76   Pulse 62   Temp 98.1 °F (36.7 °C) (Oral)   Resp 18   Ht 5' 9\" (1.753 m)   Wt 145 lb (65.8 kg)   SpO2 93%   BMI 21.41 kg/m²   Temp (24hrs), Av.7 °F (37.1 °C), Min:98.1 °F (36.7 °C), Max:99.5 °F (37.5 °C)    No results for input(s): POCGLU in the last 72 hours. I/O (24Hr):     Intake/Output Summary (Last 24 hours) at 2022 1111  Last data filed at 12/14/2022 0838  Gross per 24 hour   Intake --   Output 1825 ml   Net -1825 ml       Labs:      CBC:   Lab Results   Component Value Date/Time    WBC 9.1 12/14/2022 05:27 AM    RBC 4.29 12/14/2022 05:27 AM    HGB 12.0 12/14/2022 05:27 AM    HCT 38.3 12/14/2022 05:27 AM    MCV 89.3 12/14/2022 05:27 AM    MCH 28.0 12/14/2022 05:27 AM    MCHC 31.3 12/14/2022 05:27 AM    RDW 13.4 12/14/2022 05:27 AM     12/14/2022 05:27 AM    MPV 10.3 12/14/2022 05:27 AM     CBC with Differential:    Lab Results   Component Value Date/Time    WBC 9.1 12/14/2022 05:27 AM    RBC 4.29 12/14/2022 05:27 AM    HGB 12.0 12/14/2022 05:27 AM    HCT 38.3 12/14/2022 05:27 AM     12/14/2022 05:27 AM    MCV 89.3 12/14/2022 05:27 AM    MCH 28.0 12/14/2022 05:27 AM    MCHC 31.3 12/14/2022 05:27 AM    RDW 13.4 12/14/2022 05:27 AM    LYMPHOPCT 11 12/14/2022 05:27 AM    MONOPCT 14 12/14/2022 05:27 AM    BASOPCT 0 12/14/2022 05:27 AM    MONOSABS 1.29 12/14/2022 05:27 AM    LYMPHSABS 0.98 12/14/2022 05:27 AM    EOSABS 0.12 12/14/2022 05:27 AM    BASOSABS 0.04 12/14/2022 05:27 AM    DIFFTYPE NOT REPORTED 03/03/2017 10:59 PM     Hemoglobin/Hematocrit:    Lab Results   Component Value Date/Time    HGB 12.0 12/14/2022 05:27 AM    HCT 38.3 12/14/2022 05:27 AM     CMP:    Lab Results   Component Value Date/Time     12/14/2022 05:27 AM    K 3.6 12/14/2022 05:27 AM     12/14/2022 05:27 AM    CO2 27 12/14/2022 05:27 AM    BUN 4 12/14/2022 05:27 AM    CREATININE 0.72 12/14/2022 05:27 AM    GFRAA >60 10/12/2021 07:50 AM    LABGLOM >60 12/14/2022 05:27 AM    GLUCOSE 119 12/14/2022 05:27 AM    PROT 5.3 12/14/2022 05:27 AM    LABALBU 2.5 12/14/2022 05:27 AM    CALCIUM 8.2 12/14/2022 05:27 AM    BILITOT 0.2 12/14/2022 05:27 AM    ALKPHOS 79 12/14/2022 05:27 AM    AST 34 12/14/2022 05:27 AM    ALT 16 12/14/2022 05:27 AM     BMP:    Lab Results   Component Value Date/Time     12/14/2022 05:27 AM    K 3.6 12/14/2022 05:27 AM    CL 102 12/14/2022 05:27 AM    CO2 27 12/14/2022 05:27 AM    BUN 4 12/14/2022 05:27 AM    LABALBU 2.5 12/14/2022 05:27 AM    CREATININE 0.72 12/14/2022 05:27 AM    CALCIUM 8.2 12/14/2022 05:27 AM    GFRAA >60 10/12/2021 07:50 AM    LABGLOM >60 12/14/2022 05:27 AM    GLUCOSE 119 12/14/2022 05:27 AM     PT/INR:    Lab Results   Component Value Date/Time    PROTIME 15.1 12/10/2022 05:23 AM    INR 1.2 12/10/2022 05:23 AM     PTT:    Lab Results   Component Value Date/Time    APTT 30.3 12/09/2022 09:10 PM   [APTT}    Physical Examination:        General appearance: alert, cooperative and no distress  Mental Status: oriented to person, place and time and normal affect  Lungs: clear to auscultation bilaterally, normal effort  Heart: regular rate and rhythm, no murmur,  Abdomen: soft, nontender, nondistended, bowel sounds present all four quadrants, no masses, hepatomegaly or splenomegaly  Extremities: no edema, redness or tenderness in the calves  Skin: no gross lesions, rashes, or induration    Assessment:        Primary Problem  Other ascites     Active Hospital Problems    Diagnosis Date Noted    Severe malnutrition ([facility] [address] [E43] 12/12/2022     Priority: Medium    Lower abdominal pain [R10.30] 12/10/2022     Priority: Medium    Diffuse abdominal pain [R10.84] 12/10/2022     Priority: Medium    Failure to thrive in adult [R62.7] 12/10/2022     Priority: Medium    Anorexia [R63.0] 12/10/2022     Priority: Medium    Other ascites [R18.8] 12/09/2022     Priority: Medium     Past Medical History:   Diagnosis Date    Bladder stone     CAD (coronary artery disease)     MI, Stents, CABG    Diverticulitis     GERD (gastroesophageal reflux disease)     Hyperlipidemia     Hypertension     Intermittent self-catheterization of bladder     LBBB (left bundle branch block) 3/4/2017    MI (myocardial infarction) (HCC)     Neuropathy     bilat toes    Polio     age 10    Wears glasses         Plan:        Abdominal pain, ?  Hx of cirrhosis, ascites on imaging  S/p paracentesis with 3 L removed  Ascitic fluid analysis pending; appears to be exudate, SAAG < 1.1, PMN < 250 (was on abx for approximately 24 hours)  AFB negative  Cytology, culture, protein pending  On Rocephin per ID  Liver disease workup in progress  MRI Abdomen  2 gm sodium diet  Dysphagia, malnutrition  Passed swallow study  Continue ST recommendations  Given poor po intake, intermittent dysphagia will arrange EGD tomorrow  NPO after midnight  Hold AC    Time spent reviewing the chart, seeing the patient, and discussing with the attending MD around 30 minutes.     Explained to the patient and d/W Nursing Staff  Will F/U with you  Please call or Page for any issues or change in status  Thanks    Electronically signed by MAJO Serna NP on 12/14/2022 at 11:11 AM

## 2022-12-14 NOTE — CARE COORDINATION
Social work; RN advised pt now has a procedure set up here tomorrow morning. Cancel transport for today to [facility] and [facility]". Pt is accepted.  Phone for report: [phone]  fax: [phone]  Vanda honeycutt

## 2022-12-14 NOTE — PROGRESS NOTES
Jaron Maurice   Urology Progress Note            Subjective: Follow-up urinary retention enlarged prostate    Patient Vitals for the past 24 hrs:   BP Temp Temp src Pulse Resp SpO2   12/14/22 0433 123/64 98.4 °F (36.9 °C) Temporal 85 16 94 %   12/14/22 0015 119/63 99 °F (37.2 °C) Axillary 68 18 94 %   12/13/22 1636 125/60 99.5 °F (37.5 °C) Oral 73 22 --   12/13/22 1138 117/66 98.6 °F (37 °C) Oral 70 18 93 %   12/13/22 0830 129/65 -- -- 88 -- --   12/13/22 0736 95/78 98.4 °F (36.9 °C) Oral (!) 105 18 96 %       Intake/Output Summary (Last 24 hours) at 12/14/2022 0609  Last data filed at 12/13/2022 1242  Gross per 24 hour   Intake --   Output 575 ml   Net -575 ml       Recent Labs     12/12/22  0511 12/13/22  0513 12/14/22  0527   WBC 10.4 8.8 9.1   HGB 11.3* 11.9* 12.0*   HCT 36.0* 38.0* 38.3*   MCV 90.0 89.6 89.3    314 325     Recent Labs     12/12/22  0511 12/13/22  0513 12/14/22  0527    137 137   K 3.8 3.5* 3.6*    102 102   CO2 25 26 27   BUN 6* 4* 4*   CREATININE 0.80 0.73 0.72       No results for input(s): COLORU, PHUR, LABCAST, WBCUA, RBCUA, MUCUS, TRICHOMONAS, YEAST, BACTERIA, CLARITYU, SPECGRAV, LEUKOCYTESUR, UROBILINOGEN, BILIRUBINUR, BLOODU in the last 72 hours.     Invalid input(s): NITRATE, GLUCOSEUKETONESUAMORPHOUS    Additional Lab/culture results:    Physical Exam: Patient sitting in bed, family at bedside, discussed with patient and family urinary retention enlarged prostate also discussed diverticulitis and findings reviewed with gastroenterology     Interval Imaging Findings:    Impression: Chronic retention of urine prior cystoscopy did not reveal any evidence of bladder perforation  Patient Active Problem List   Diagnosis    Neuropathy of both feet    LBBB (left bundle branch block)    S/P CABG (coronary artery bypass graft)    Unstable angina (HCC)    S/P cardiac cath    S/P ICD (internal cardiac defibrillator) procedure    Other ascites    Lower abdominal pain    Diffuse abdominal pain    Failure to thrive in adult    Anorexia    Severe malnutrition (HCC)       Plan: Discussed with patient and family maintaining indwelling [facility] until ready for discharge    Twana Angelucci, MD  6:09 AM 12/14/2022

## 2022-12-14 NOTE — PLAN OF CARE
Pt remained safe and free from falls throughout shift. Wife has been at bedside all day. MRI/MRCP was done. EGD in morning so NPO at midnight. [facility] still in place. Zofran given 2x today d/t nausea and some vomiting of phlegm episodes. Pt is resting in bed and feeling tired. Still has pain in the RLQ. Hemoc was consulted d/t ascited and abdominal pain. Bed in lowest position, call light in place. Will continue to monitor. Problem: Discharge Planning  Goal: Discharge to home or other facility with appropriate resources  12/14/2022 1831 by Rob Rush RN  Outcome: Progressing  Flowsheets (Taken 12/14/2022 1831)  Discharge to home or other facility with appropriate resources:   Identify barriers to discharge with patient and caregiver   Arrange for needed discharge resources and transportation as appropriate     Problem: Skin/Tissue Integrity  Goal: Absence of new skin breakdown  Description: 1. Monitor for areas of redness and/or skin breakdown  2. Assess vascular access sites hourly  3. Every 4-6 hours minimum:  Change oxygen saturation probe site  4. Every 4-6 hours:  If on nasal continuous positive airway pressure, respiratory therapy assess nares and determine need for appliance change or resting period.   12/14/2022 1831 by Rob Rush RN  Outcome: Progressing     Problem: Safety - Adult  Goal: Free from fall injury  12/14/2022 1831 by Rob Rush RN  Outcome: Progressing  Flowsheets (Taken 12/14/2022 1831)  Free From Fall Injury: Instruct family/caregiver on patient safety     Problem: ABCDS Injury Assessment  Goal: Absence of physical injury  12/14/2022 1831 by Rob Rush RN  Outcome: Progressing  Flowsheets (Taken 12/14/2022 1831)  Absence of Physical Injury: Implement safety measures based on patient assessment     Problem: Genitourinary - Adult  Goal: Absence of urinary retention  12/14/2022 1831 by Rob Rush RN  Outcome: Progressing  Flowsheets (Taken 12/14/2022 1831)  Absence of urinary retention:   Assess patients ability to void and empty bladder   Monitor intake/output and perform bladder scan as needed     Problem: Genitourinary - Adult  Goal: Urinary catheter remains patent  12/14/2022 1831 by Rob Rush RN  Outcome: Progressing  Flowsheets (Taken 12/14/2022 1831)  Urinary catheter remains patent: Assess patency of urinary catheter     Problem: Nutrition Deficit:  Goal: Optimize nutritional status  12/14/2022 1831 by Rob Rush RN  Outcome: Progressing  Flowsheets (Taken 12/14/2022 1831)  Nutrient intake appropriate for improving, restoring, or maintaining nutritional needs:   Assess nutritional status and recommend course of action   Monitor oral intake, labs, and treatment plans

## 2022-12-14 NOTE — CONSULTS
_                         Today's Date: 12/14/2022  Patient Name: Oriana Espinal  Date of admission: 12/9/2022  5:11 PM  Patient's age: 81 y.o., 1940  Admission Dx: Diverticulosis of colon [K57.30]  Other ascites [R18.8]  Lower abdominal pain [R10.30]  Failure to thrive in adult [R62.7]  Ascites of liver [R18.8]      Requesting Physician: Terrance Velasquez MD    CHIEF COMPLAINT: Abdominal pain. Weight loss. Ascites. History Obtained From: Family, electronic medical record    HISTORY OF PRESENT ILLNESS:      The patient is a 81 y.o.  male who is admitted to the hospital for further management of increasing abdominal pain and failure to thrive. Patient is unable to give any history. History is obtained from the chart and talking to the wife and daughter. Patient presented to [facility] due to worsening abdominal pain and abdominal distention since October. He had weight loss and decreased appetite. Patient had recurrent episodes of vomiting yellow mucus and multiple episodes of diarrhea. He had no fever. He had abdominal CT scan which showed ascites. There was no perforation. No bowel obstruction. Patient had evaluation by ID and by gastroenterology. He will have EGD tomorrow. Last colonoscopy was 2017. Patient had multiple comorbidities including coronary artery disease and benign prostate hypertrophy as well as hyperlipidemia. And history of Alzheimer disease. Past Medical History:   has a past medical history of Bladder stone, CAD (coronary artery disease), Diverticulitis, GERD (gastroesophageal reflux disease), Hyperlipidemia, Hypertension, Intermittent self-catheterization of bladder, LBBB (left bundle branch block), MI (myocardial infarction) ([facility] [address] Neuropathy, Polio, and Wears glasses. Past Surgical History:   has a past surgical history that includes Coronary artery bypass graft (2000);  Coronary angioplasty with stent; Cardiac surgery (2000); Colonoscopy; Endoscopy, colon, diagnostic; and Bladder stone removal.   Family History: family history is not on file. Social History:   reports that he has never smoked. He has never used smokeless tobacco. He reports that he does not drink alcohol and does not use drugs. Medications:    Prior to Admission medications    Medication Sig Start Date End Date Taking? Authorizing Provider   cefdinir (OMNICEF) 300 MG capsule Take 300 mg by mouth 2 times daily 14-day course filled 12/8/22   Yes Historical Provider, MD   dicyclomine (BENTYL) 10 MG capsule Take 10 mg by mouth 2 times daily as needed   Yes Historical Provider, MD   omeprazole (PRILOSEC) 40 MG delayed release capsule Take 40 mg by mouth daily   Yes Historical Provider, MD   metroNIDAZOLE (FLAGYL) 500 MG tablet Take 500 mg by mouth 2 times daily 24-day course filled 12/1/22   Yes Historical Provider, MD   pregabalin (LYRICA) 100 MG capsule Take 100 mg by mouth 2 times daily.     Historical Provider, MD   finasteride (PROSCAR) 5 MG tablet Take 5 mg by mouth daily    Historical Provider, MD   donepezil (ARICEPT) 10 MG tablet Take 10 mg by mouth 2 times daily    Historical Provider, MD   amitriptyline (ELAVIL) 25 MG tablet Take 25-50 mg by mouth nightly    Historical Provider, MD   rosuvastatin (CRESTOR) 10 MG tablet Take 10 mg by mouth daily    Historical Provider, MD   ondansetron (ZOFRAN) 4 MG tablet Take 4 mg by mouth every 8 hours as needed for Nausea or Vomiting    Historical Provider, MD   Melatonin 10 MG CAPS Take 10-20 mg by mouth nightly    Historical Provider, MD   famotidine (PEPCID) 40 MG tablet Take 40 mg by mouth 2 times daily    Historical Provider, MD   acetaminophen (TYLENOL) 500 MG tablet Take 500 mg by mouth every 6 hours as needed prn    Historical Provider, MD   Magnesium 400 MG TABS Take 400 mg by mouth Daily    Historical Provider, MD   vitamin D-3 (CHOLECALCIFEROL) 125 MCG (5000 UT) TABS Take 5,000 Units by mouth daily    Historical Provider, MD   Cyanocobalamin 2500 MCG TABS Take 5,000 mcg by mouth Daily    Historical Provider, MD   nitroGLYCERIN (NITROSTAT) 0.4 MG SL tablet Place 0.4 mg under the tongue every 5 minutes as needed for Chest pain up to max of 3 total doses. If no relief after 1 dose, call 911.  Using as needed but has not needed them    Historical Provider, MD   clopidogrel (PLAVIX) 75 MG tablet Take 75 mg by mouth daily    Historical Provider, MD   Diphenhydramine-APAP, sleep, (TYLENOL PM EXTRA STRENGTH PO) Take 1 tablet by mouth nightly as needed    Historical Provider, MD     Current Facility-Administered Medications   Medication Dose Route Frequency Provider Last Rate Last Admin    0.9 % sodium chloride bolus  50 mL IntraVENous Once Lonza MAJO Dhillon - NP        famotidine (PEPCID) tablet 40 mg  40 mg Oral BID Marcus Pinto MD   40 mg at 12/14/22 0829    rosuvastatin (CRESTOR) tablet 10 mg  10 mg Oral Daily Elijah Loya MD   10 mg at 12/14/22 0829    pantoprazole (PROTONIX) tablet 40 mg  40 mg Oral QAM AC Elijah Loya MD   40 mg at 12/14/22 0518    dextrose 5 % and 0.45 % sodium chloride infusion   IntraVENous Continuous Abdirizak Bonilla MD 75 mL/hr at 12/14/22 0532 New Bag at 12/14/22 0532    potassium chloride 10 mEq/100 mL IVPB (Peripheral Line)  10 mEq IntraVENous PRN Abdirizak Bonilla  mL/hr at 12/13/22 1201 10 mEq at 12/13/22 1201    amitriptyline (ELAVIL) tablet 25 mg  25 mg Oral Nightly Kyle Varela MD   25 mg at 12/13/22 2115    donepezil (ARICEPT) tablet 10 mg  10 mg Oral BID Kyle Varela MD   10 mg at 12/14/22 0830    finasteride (PROSCAR) tablet 5 mg  5 mg Oral Daily Abdirizak Bonilla MD   5 mg at 12/14/22 0828    magnesium oxide (MAG-OX) tablet 400 mg  400 mg Oral Daily Abdirizak Bonilla MD   400 mg at 12/14/22 0829    melatonin tablet 9 mg  9 mg Oral Nightly Abdirizak Bonilla MD   9 mg at 12/13/22 2124    nitroGLYCERIN (NITROSTAT) SL tablet 0.4 mg  0.4 mg SubLINGual Q5 Min PRN Abdirizak Bonilla MD        pregabalin (LYRICA) capsule 100 mg  100 mg Oral BID Abdirizak Bonilla MD   100 mg at 12/14/22 0830    sucralfate (CARAFATE) tablet 1 g  1 g Oral 4x Daily Abdirizak Bonilla MD   1 g at 12/14/22 0829    enoxaparin (LOVENOX) injection 40 mg  40 mg SubCUTAneous Daily Abdirizak Bonilla MD   40 mg at 12/14/22 6044    sodium chloride flush 0.9 % injection 5-40 mL  5-40 mL IntraVENous 2 times per day Jeanne Robertson MD        sodium chloride flush 0.9 % injection 5-40 mL  5-40 mL IntraVENous PRN Jeanne Robertson MD        sodium chloride flush 0.9 % injection 5-40 mL  5-40 mL IntraVENous 2 times per day Nivia Todd APRN - CNP   10 mL at 12/13/22 0831    sodium chloride flush 0.9 % injection 10 mL  10 mL IntraVENous PRN Cary A Lump, APRN - CNP        0.9 % sodium chloride infusion   IntraVENous PRN Nivia Todd APRN - CNP 10 mL/hr at 12/13/22 0636 New Bag at 12/13/22 0636    magnesium sulfate 1000 mg in dextrose 5% 100 mL IVPB  1,000 mg IntraVENous PRN Cary A Lump, APRN - CNP        ondansetron (ZOFRAN-ODT) disintegrating tablet 4 mg  4 mg Oral Q8H PRN Cary A Lump, APRN - CNP        Or    ondansetron (ZOFRAN) injection 4 mg  4 mg IntraVENous Q6H PRN Cary A Lump, APRN - CNP   4 mg at 12/14/22 1346    polyethylene glycol (GLYCOLAX) packet 17 g  17 g Oral Daily PRN Cary A Lump, APRN - CNP        acetaminophen (TYLENOL) tablet 650 mg  650 mg Oral Q6H PRN Cary A Lump, APRN - CNP        Or    acetaminophen (TYLENOL) suppository 650 mg  650 mg Rectal Q6H PRN Cary A Lump, APRN - CNP           Allergies:  Ciprofloxacin, Codeine, Morphine, Pletal [cilostazol], Sulfamethoxazole-trimethoprim, and Pcn [penicillins]    REVIEW OF SYSTEMS:      Difficult to obtain from the patient. Discussion with the wife and daughter. No other complaints about from was mentioned above.     PHYSICAL EXAM:      /65   Pulse 95   Temp 97.3 °F (36.3 °C) (Oral)   Resp 18   Ht 5' 9\" (1.753 m)   Wt 145 lb (65.8 kg) SpO2 97%   BMI 21.41 kg/m²    Temp (24hrs), Av.5 °F (36.9 °C), Min:97.3 °F (36.3 °C), Max:99.5 °F (37.5 °C)      General appearance - not in pain or distress. Looks cachectic. Mental status -patient is sleepy at the time of my exam.  Eyes - pupils equal and reactive, extraocular eye movements intact  Ears - bilateral TM's and external ear canals normal  Nose - normal and patent, no erythema, discharge or polyps  Mouth - mucous membranes moist, pharynx normal without lesions  Neck - supple, no significant adenopathy  Lymphatics - no palpable lymphadenopathy, no hepatosplenomegaly  Chest - clear to auscultation, no wheezes, rales or rhonchi, symmetric air entry  Heart - normal rate, regular rhythm, normal S1, S2, no murmurs, rubs, clicks or gallops  Abdomen -generalized abdominal tenderness. , no masses or organomegaly  Neurological -no apparent focal neurological deficit  Musculoskeletal - no joint tenderness, deformity or swelling  Extremities - peripheral pulses normal, no pedal edema, no clubbing or cyanosis  Skin - normal coloration and turgor, no rashes, no suspicious skin lesions noted           DATA:      Labs:       CBC:   Recent Labs     22   WBC 8.8 9.1   HGB 11.9* 12.0*   HCT 38.0* 38.3*    325     BMP:   Recent Labs     22    137   K 3.5* 3.6*   CO2 26 27   BUN 4* 4*   CREATININE 0.73 0.72   LABGLOM >60 >60   GLUCOSE 116* 119*     PT/INR: No results for input(s): PROTIME, INR in the last 72 hours. APTT:No results for input(s): APTT in the last 72 hours.   LIVER PROFILE:  Recent Labs     22   AST 21 34   ALT 11 16   LABALBU 2.4* 2.5*     XR CHEST (SINGLE VIEW FRONTAL)  Narrative: EXAMINATION:  ONE XRAY VIEW OF THE CHEST    2022 7:14 pm    COMPARISON:  10/11/2021    HISTORY:  ORDERING SYSTEM PROVIDED HISTORY: cough  TECHNOLOGIST PROVIDED HISTORY:  cough  Reason for Exam: cough    FINDINGS:  Prior sternotomy. Cardiomediastinal silhouette is unchanged in size. Left  subclavian cardiac pacing device is unchanged. There is no large pleural  effusion or pneumothorax. No pulmonary consolidation. Scattered calcified  granulomas. No acute osseous abnormality. Impression: No acute cardiopulmonary abnormality. IMPRESSION:    Primary Problem  Other ascites    Active Hospital Problems    Diagnosis Date Noted    Severe malnutrition ([facility] [address] [E43] 12/12/2022     Priority: Medium    Lower abdominal pain [R10.30] 12/10/2022     Priority: Medium    Diffuse abdominal pain [R10.84] 12/10/2022     Priority: Medium    Failure to thrive in adult [R62.7] 12/10/2022     Priority: Medium    Anorexia [R63.0] 12/10/2022     Priority: Medium    Other ascites [R18.8] 12/09/2022     Priority: Medium       RECOMMENDATIONS:  Records and labs and images were reviewed and discussed with the family. Patient was also evaluated by multiple other specialties. Appreciate the urology, ID and GI input. Explained to the family the concern about his weight loss and his GI symptoms with unexplained ascites and the possibility for possible underlying malignancy. So far paracentesis fluid is negative for malignancy but that does not completely rule out that possibility. Patient will have EGD and possibly colonoscopy down the line. He is scheduled for abdominal MRI as well. We will review the results and we will order tumor markers as well. We will follow with you. Questions were answered to the best of their satisfaction  Thank you for allowing us to participate in the care of this pleasant patient.         Discussed with family     Brennan Arambula MD, MD                            [address] [facility], [place], operated by [facility]                            This note is created with the assistance of a speech recognition program.  While intending to generate a document that actually reflects the content of the visit, the document can still have some errors including those of syntax and sound a like substitutions which may escape proof reading. It such instances, actual meaning can be extrapolated by contextual diversion.

## 2022-12-14 NOTE — PROGRESS NOTES
[facility]  Progress Note    Desirae Carbajal  MRN: [id number]  Date: 12/14/2022  LOS: 5     Reason for F/U :   Suspected SBP    Impression :   Ascites of unknown origin with work-up to rule out spontaneous bacterial peritonitis  No abnormalities of LFTs or the liver noted on CT abdomen 12/9  Studies: Serum albumin ratio 0.6 and ascites: Serum creatinine ratio 0.95 ruling out portal hypertension and no perforations of the bladder  Fluid cell count with PMN less than 250, not consistent with an infection  History of diverticulitis of the bladder and sigmoid colon without perforations or surgical correction  CT abdomen 12/9 does not show active diverticulitis  Gastroesophageal reflux disease without esophagitis  Alzheimer's dementia  Failure to thrive  Coronary artery disease, s/p CABG  BPH    Recommendations:   He is s/p paracentesis 12/12/2022 with removal of 3 L of fluid without evidence of bladder leakage or portal hypertension  Initial examination did not reveal bacteria, but many neutrophils present; cell count not consistent with SBP, will discontinue the ceftriaxone  MRI abdomen has been ordered by GI  GI service is following, an EGD is scheduled 12/15/2022; a colonoscopy is not scheduled at this time  Continue supportive care    Infection Control Recommendations:   Universal precautions    Discharge Planning:     Patient will need Midline Catheter Insertion/ PICC line Insertion: No  Patient will need: Home IV , [place],  SNF,  LTAC: Undetermined  Patient willneed outpatient wound care: No    Medical Decision making / Summary of Stay:   Desirae Carbajal is a 81y.o.-year-old male who was initially admitted on 12/9/2022 for diffuse abdominal pain with distension. History was mainly provided by the wife and daughter who were at bedside. Abdominal problems began October where patient was only experiencing pain.  CT abdomen imaging revealed diverticulitis of the bladder; treated by  Kathy Huggins who performed a cystoscopy to \"drain all the pus\" and then with Levaquin. Patient tolerated the medication for 5 days before worsening of neuropathy to the point where the patient was unable to walk- Levaquin discontinued. After cystoscopy, patient developed GERD, diarrhea 2-3x per day that was watery and rust colored, and further worsening abdominal pain requiring another ED visit to [facility]  where CT abdomen revealed sigmoid colon diverticulitis treated with an antibiotic, but family could not recall name. No surgery was needed at time. However with treatment, symptoms worsened and by , patient had to be hospitalized due to weakness and continued diverticulitis that required Flagyl. [facility] after worsening weight loss, decreased appetite, abdominal pain, increased distension, and recurrent vomiting productive of yellow mucus. CT abdomen showed ascites without diverticulitis, perforations, or abscess. Liver function test without abnormalities. No leukocytosis. PMHx significant for CAD s/p CABG, BPH, hyperlipidemia, Alzheimer's, GERD, nephrolithiasis, and bilateral lower extremity neuropathy. Last colonoscopy was  without abnormal findings per family. Today, patient denies fevers, chills, SOB, cough, dysuria, hematuria, hematochezia, and melena. Positive for abdominal pain, nausea without vomiting, diarrhea, GERD, distension, and abdominal pain. Current evaluation:2022    /76   Pulse 62   Temp 98.1 °F (36.7 °C) (Oral)   Resp 18   Ht 5' 9\" (1.753 m)   Wt 145 lb (65.8 kg)   SpO2 93%   BMI 21.41 kg/m²     Temperature Range: Temp: 98.1 °F (36.7 °C) Temp  Av.7 °F (37.1 °C)  Min: 98.1 °F (36.7 °C)  Max: 99.5 °F (37.5 °C)    The patient was seen and evaluated lying in bed, he is experiencing abdominal discomfort. He complained of nausea with no emesis    Review of Systems   Constitutional:  Positive for fatigue. HENT: Negative. Respiratory: Negative. Cardiovascular: Negative. Gastrointestinal:  Positive for abdominal pain and nausea. Negative for vomiting. Genitourinary: Negative. Musculoskeletal: Negative. Skin: Negative. Neurological: Negative. Psychiatric/Behavioral: Negative. Physical Examination :     Physical Exam  Constitutional:       Appearance: Normal appearance. He is normal weight. HENT:      Head: Normocephalic and atraumatic. Pulmonary:      Effort: Pulmonary effort is normal. No respiratory distress. Abdominal:      General: There is no distension. Tenderness: There is abdominal tenderness. Musculoskeletal:         General: Normal range of motion. Skin:     General: Skin is warm and dry. Neurological:      General: No focal deficit present. Mental Status: He is alert and oriented to person, place, and time. Mental status is at baseline. Psychiatric:         Mood and Affect: Mood normal.         Behavior: Behavior normal.         Thought Content:  Thought content normal.       Laboratory data:   I have independently reviewed the followinglabs:  CBC with Differential:   Recent Labs     12/13/22 0513 12/14/22 0527   WBC 8.8 9.1   HGB 11.9* 12.0*   HCT 38.0* 38.3*    325   LYMPHOPCT 12* 11*   MONOPCT 14* 14*     BMP:   Recent Labs     12/13/22 0513 12/14/22 0527    137   K 3.5* 3.6*    102   CO2 26 27   BUN 4* 4*   CREATININE 0.73 0.72   MG 1.7  --      Hepatic Function Panel:   Recent Labs     12/13/22 0513 12/14/22 0527   PROT 5.2* 5.3*   LABALBU 2.4* 2.5*   BILITOT 0.2* 0.2*   ALKPHOS 72 79   ALT 11 16   AST 21 34         No results found for: PROCAL  No results found for: CRP  No results found for: SEDRATE      No results found for: DDIMER  Lab Results   Component Value Date/Time    FERRITIN 97 12/10/2022 10:20 AM     No results found for: LDH  No results found for: FIBRINOGEN    Results in Past 30 Days  Result Component Current Result Ref Range Previous Result Ref Range   SARS-CoV-2, Rapid Not Detected (12/9/2022) Not Detected Not in Time Range      Lab Results   Component Value Date/Time    COVID19 Not Detected 12/09/2022 08:14 PM       No results for input(s): MELANY in the last 72 hours. Imaging Studies:   Chest xray  Impression:        No acute cardiopulmonary abnormality. Cultures:     Culture, Body Fluid [[id number]] (Abnormal) Collected: 12/12/22 1400   Order Status: Completed Specimen: Ascitic Fluid Updated: 12/14/22 0813    Specimen Description . ASCITIC FLUID    Direct Exam MANY NEUTROPHILS Abnormal      NO ORGANISMS SEEN    Culture NO GROWTH 2 DAYS   AFB STAIN [[id number]] Collected: 12/12/22 1400   Order Status: Sent Specimen: Sputum from Ascitic Fluid Updated: 12/13/22 1032   Gram stain [[id number]] Collected: 12/10/22 1015   Order Status: Canceled Specimen: Ascitic Fluid    COVID-19, Rapid [[id number]] Collected: 12/09/22 2014   Order Status: Completed Specimen: Nasopharyngeal Swab Updated: 12/09/22 2102    Specimen Description . NASOPHARYNGEAL SWAB    SARS-CoV-2, Rapid Not Detected       Medications:      famotidine  40 mg Oral BID    rosuvastatin  10 mg Oral Daily    pantoprazole  40 mg Oral QAM AC    cefTRIAXone (ROCEPHIN) IV  1,000 mg IntraVENous Q24H    amitriptyline  25 mg Oral Nightly    clopidogrel  75 mg Oral Daily    donepezil  10 mg Oral BID    finasteride  5 mg Oral Daily    magnesium oxide  400 mg Oral Daily    melatonin  9 mg Oral Nightly    pregabalin  100 mg Oral BID    sucralfate  1 g Oral 4x Daily    enoxaparin  40 mg SubCUTAneous Daily    sodium chloride flush  5-40 mL IntraVENous 2 times per day    sodium chloride flush  5-40 mL IntraVENous 2 times per day           [facility]  MAJO Ballard CNP  [employer]  OFFICE: [phone]      Electronically signed by MAJO Ballard CNP on 12/14/2022 at 9:28 AM  Thank you for allowing us to participate in the care of this patient. Please call with questions. This note iscreated with the assistance of a speech recognition program.  While intending to generate a document that actually reflects the content of the visit, the document can still have some errors including those of syntax andsound a like substitutions which may escape proof reading. In such instances, actual meaning can be extrapolated by contextual diversion.

## 2022-12-14 NOTE — PROGRESS NOTES
[address] Hospitalist        12/14/2022   4:33 PM    Name:  Sukumar Castro  MRN:    [id number]     Acct:     [de-identified]   Room:  0/18-0  IP Day: 5     Admit Date: 12/9/2022  5:11 PM  PCP: Arelis Lou DO    C/C:   Chief Complaint   Patient presents with    Abdominal Pain     Loss of appetite       Assessment:      Lower abdominal pain  Unlikely spontaneous bacterial peritonitis  Questionable history of liver cirrhosis  Ascites  Failure to thrive  Diverticulosis  Coronary artery disease, native vessel  Status post CABG  Left bundle branch block  Benign prostatic hypertrophy  Mixed hyperlipidemia  Major depressive disorder  Dementia  Gastroesophageal reflux disease without esophagitis  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  Polio at age 10    Plan:      Patient is admitted to progressive unit  Oxygen to keep SPO2 more than 90%  Telemetry  Check vital signs closely  CBC BMP daily  Blood culture  Gastroenterology is involved, they are suspecting spontaneous bacterial peritonitis, patient is continued on IV antibiotics IV Rocephin, further they will follow-up fluid cultures from ascitic fluid and patient is status post paracentesis with 3 L removal fluid on 12/12/2022  Plan for EGD colonoscopy noted for tomorrow  Urology consult  Continue Plavix  Continue Crestor  Infectious disease have evaluated patient, they recommended patient likely does not have spontaneous bacterial peritonitis, for now they have continued patient on IV Rocephin  Continue other medication as below  Hopefully discharge planning in next 24 to 48 hours if okay with everyone and if continues to do well    Scheduled Meds:   sodium chloride  50 mL IntraVENous Once    famotidine  40 mg Oral BID    rosuvastatin  10 mg Oral Daily    pantoprazole  40 mg Oral QAM AC    amitriptyline  25 mg Oral Nightly    donepezil  10 mg Oral BID    finasteride  5 mg Oral Daily    magnesium oxide  400 mg Oral Daily    melatonin  9 mg Oral Nightly pregabalin  100 mg Oral BID    sucralfate  1 g Oral 4x Daily    enoxaparin  40 mg SubCUTAneous Daily    sodium chloride flush  5-40 mL IntraVENous 2 times per day    sodium chloride flush  5-40 mL IntraVENous 2 times per day     Continuous Infusions:   dextrose 5 % and 0.45 % NaCl 75 mL/hr at 22 0532    sodium chloride 10 mL/hr at 22 0636     PRN Meds:  potassium chloride, 10 mEq, PRN  nitroGLYCERIN, 0.4 mg, Q5 Min PRN  sodium chloride flush, 5-40 mL, PRN  sodium chloride flush, 10 mL, PRN  sodium chloride, , PRN  magnesium sulfate, 1,000 mg, PRN  ondansetron, 4 mg, Q8H PRN   Or  ondansetron, 4 mg, Q6H PRN  polyethylene glycol, 17 g, Daily PRN  acetaminophen, 650 mg, Q6H PRN   Or  acetaminophen, 650 mg, Q6H PRN          Subjective:       I have seen and examined patient today, patient last 24 hours events were reviewed also discussed with nursing staff  No new complaints  Overnight patient did not had any acute issues  No nausea vomiting diarrhea  Afebrile  Pt. Denies any CP, SOB, palpitation, HA, dizziness, chills, cough, cold, changes in urination, BM or skin changes . Patient denies any acute complaints    ROS:  A 10 point system reviewed and negative otherwise mentioned above. Physical Examination:      Vitals:  /72   Pulse 75   Temp 99.5 °F (37.5 °C)   Resp 19   Ht 5' 9\" (1.753 m)   Wt 145 lb (65.8 kg)   SpO2 97%   BMI 21.41 kg/m²   Temp (24hrs), Av.8 °F (37.1 °C), Min:97.3 °F (36.3 °C), Max:99.5 °F (37.5 °C)    Weight:   Wt Readings from Last 3 Encounters:   22 145 lb (65.8 kg)   10/11/21 155 lb (70.3 kg)   20 150 lb (68 kg)     I/O last 3 completed shifts:  I/O last 3 completed shifts:  In: -   Out: 2400 [Urine:2400]     No results for input(s): POCGLU in the last 72 hours.       General appearance - alert, well appearing, and in no acute distress  Mental status - oriented to person, place, and time with normal affect  Head - normocephalic and atraumatic  Eyes - pupils equal and reactive, extraocular eye movements intact, conjunctiva clear  Ears - hearing appears to be intact  Nose - no drainage noted  Mouth - mucous membranes moist  Neck - supple, no carotid bruits, thyroid not palpable  Chest - clear to auscultation, normal effort  Heart - normal rate, regular rhythm, no murmur  Abdomen - soft, diffuse abdominal tenderness, distended, bowel sounds present all four quadrants, no masses, hepatomegaly or splenomegaly  Neurological - normal speech, no focal findings or movement disorder noted, cranial nerves II through XII grossly intact  Extremities - peripheral pulses palpable, no pedal edema or calf pain with palpation  Skin - no gross lesions, rashes, or induration noted        Medications: Allergies:    Allergies   Allergen Reactions    Ciprofloxacin Other (See Comments)     Nerve pain exacerbation in feet    Codeine Other (See Comments)     insomia & tachycardia    Morphine      Pt states he has problems with his bp when given morphine    Pletal [Cilostazol]     Sulfamethoxazole-Trimethoprim     Pcn [Penicillins] Rash       Current Meds:   Current Facility-Administered Medications:     0.9 % sodium chloride bolus, 50 mL, IntraVENous, Once, MAJO Pereira - NP    famotidine (PEPCID) tablet 40 mg, 40 mg, Oral, BID, Elijah Loya MD, 40 mg at 12/14/22 0829    rosuvastatin (CRESTOR) tablet 10 mg, 10 mg, Oral, Daily, Rosie Joy MD, 10 mg at 12/14/22 0829    pantoprazole (PROTONIX) tablet 40 mg, 40 mg, Oral, QAM AC, Elijah Loya MD, 40 mg at 12/14/22 0518    dextrose 5 % and 0.45 % sodium chloride infusion, , IntraVENous, Continuous, Abdirizak Bonilla MD, Last Rate: 75 mL/hr at 12/14/22 0532, New Bag at 12/14/22 0532    potassium chloride 10 mEq/100 mL IVPB (Peripheral Line), 10 mEq, IntraVENous, PRN, Abdirizak Bonilla MD, Last Rate: 100 mL/hr at 12/13/22 1201, 10 mEq at 12/13/22 1201    amitriptyline (ELAVIL) tablet 25 mg, 25 mg, Oral, Nightly, Abdirizak Bonilla MD, 25 mg at 12/13/22 2115    donepezil (ARICEPT) tablet 10 mg, 10 mg, Oral, BID, Abdirizak Bonilla MD, 10 mg at 12/14/22 0830    finasteride (PROSCAR) tablet 5 mg, 5 mg, Oral, Daily, Ranulfo Kline MD, 5 mg at 12/14/22 0828    magnesium oxide (MAG-OX) tablet 400 mg, 400 mg, Oral, Daily, Abdirizak Bonilla MD, 400 mg at 12/14/22 0829    melatonin tablet 9 mg, 9 mg, Oral, Nightly, Abdirizak Bonilla MD, 9 mg at 12/13/22 2124    nitroGLYCERIN (NITROSTAT) SL tablet 0.4 mg, 0.4 mg, SubLINGual, Q5 Min PRN, Abdirizak Bonilla MD    pregabalin (LYRICA) capsule 100 mg, 100 mg, Oral, BID, Ranulfo Kline MD, 100 mg at 12/14/22 0830    sucralfate (CARAFATE) tablet 1 g, 1 g, Oral, 4x Daily, Abdirizak Bonilla MD, 1 g at 12/14/22 0829    enoxaparin (LOVENOX) injection 40 mg, 40 mg, SubCUTAneous, Daily, Ranulfo Kline MD, 40 mg at 12/14/22 4378    sodium chloride flush 0.9 % injection 5-40 mL, 5-40 mL, IntraVENous, 2 times per day, Jeremiah Johnson MD    sodium chloride flush 0.9 % injection 5-40 mL, 5-40 mL, IntraVENous, PRN, Jeremiah Johnson MD    sodium chloride flush 0.9 % injection 5-40 mL, 5-40 mL, IntraVENous, 2 times per day, Cary A Lump, APRN - CNP, 10 mL at 12/13/22 0831    sodium chloride flush 0.9 % injection 10 mL, 10 mL, IntraVENous, PRN, Cary A Lump, APRN - CNP    0.9 % sodium chloride infusion, , IntraVENous, PRN, Nely Rose Lump, APRN - CNP, Last Rate: 10 mL/hr at 12/13/22 0636, New Bag at 12/13/22 0636    magnesium sulfate 1000 mg in dextrose 5% 100 mL IVPB, 1,000 mg, IntraVENous, PRN, Cary A Lump, APRN - CNP    ondansetron (ZOFRAN-ODT) disintegrating tablet 4 mg, 4 mg, Oral, Q8H PRN **OR** ondansetron (ZOFRAN) injection 4 mg, 4 mg, IntraVENous, Q6H PRN, Cary A Lump, APRN - CNP, 4 mg at 12/14/22 1346    polyethylene glycol (GLYCOLAX) packet 17 g, 17 g, Oral, Daily PRN, Cary A Lump, APRN - CNP    acetaminophen (TYLENOL) tablet 650 mg, 650 mg, Oral, Q6H PRN **OR** acetaminophen (TYLENOL) suppository 650 mg, 650 mg, Rectal, Q6H PRN, Cary A Lump, APRN - CNP      I/O (24Hr): Intake/Output Summary (Last 24 hours) at 12/14/2022 1633  Last data filed at 12/14/2022 0838  Gross per 24 hour   Intake --   Output 1250 ml   Net -1250 ml         Data:           Labs:    Hematology:  Recent Labs     12/12/22 0511 12/13/22 0513 12/14/22  0527   WBC 10.4 8.8 9.1   RBC 4.00* 4.24 4.29   HGB 11.3* 11.9* 12.0*   HCT 36.0* 38.0* 38.3*   MCV 90.0 89.6 89.3   MCH 28.3 28.1 28.0   MCHC 31.4 31.3 31.3   RDW 13.4 13.5 13.4    314 325   MPV 10.5 10.6 10.3       Chemistry:  Recent Labs     12/12/22 0511 12/13/22 0513 12/14/22  0527    137 137   K 3.8 3.5* 3.6*    102 102   CO2 25 26 27   GLUCOSE 118* 116* 119*   BUN 6* 4* 4*   CREATININE 0.80 0.73 0.72   MG  --  1.7  --    ANIONGAP 8* 9 8*   LABGLOM >60 >60 >60   CALCIUM 8.1* 8.1* 8.2*       Recent Labs     12/12/22 0511 12/13/22 0513 12/14/22  0527   PROT 5.3* 5.2* 5.3*   LABALBU 2.5* 2.4* 2.5*   AST 27 21 34   ALT 12 11 16   ALKPHOS 71 72 79   BILITOT 0.3 0.2* 0.2*         Lab Results   Component Value Date/Time    SPECIAL NOT REPORTED 02/18/2020 10:58 PM     Lab Results   Component Value Date/Time    CULTURE NO GROWTH 2 DAYS 12/12/2022 02:00 PM       No results found for: POCPH, PHART, PH, POCPCO2, MWB4MWP, PCO2, POCPO2, PO2ART, PO2, POCHCO3, EHN9MNH, HCO3, NBEA, PBEA, BEART, BE, THGBART, THB, EOZ1ZYG, RLZO6PXW, I6AKOIYL, O2SAT, FIO2    Radiology:    CT ABDOMEN PELVIS W IV CONTRAST Additional Contrast? None    Result Date: 12/9/2022  Moderate ascites with associated moderate diffuse edema within the mesentery. Severe diverticulosis involving the left colon without evidence of acute diverticulitis. No abscess or perforation. Numerous bladder diverticula. Laminectomy at L4 and L5. Bilateral gynecomastia. FL MODIFIED BARIUM SWALLOW W VIDEO    Result Date: 12/10/2022  1. 1 instance of flash penetration without aspiration with the pureed/pudding thick substance.  2. No penetration or aspiration with the remainder of the administered substances/administrations. Please see separate speech pathology report for full discussion of findings and recommendations. All radiological studies reviewed  Code Status:  Full Code        Electronically signed by Adeel Kay MD on 12/14/2022 at 4:33 PM    This note was created with the assistance of a speech-recognition program.  Although the intention is to generate a document that actually reflects the content of the visit, no guarantees can be provided that every mistake has been identified and corrected by editing. Note was updated later by me after  physical examination and  completion of the assessment.

## 2022-12-14 NOTE — PLAN OF CARE
Pt had a good night. Rested for most of the night. Wife at bedside with pt overnight. Pt had 1000 output from de la o, de la o care done. No complaints of pain, wcm. Bed in lowest position, bed locked, call light within reach. Problem: Discharge Planning  Goal: Discharge to home or other facility with appropriate resources  Outcome: Progressing     Problem: Skin/Tissue Integrity  Goal: Absence of new skin breakdown  Description: 1. Monitor for areas of redness and/or skin breakdown  2. Assess vascular access sites hourly  3. Every 4-6 hours minimum:  Change oxygen saturation probe site  4. Every 4-6 hours:  If on nasal continuous positive airway pressure, respiratory therapy assess nares and determine need for appliance change or resting period.   Outcome: Progressing     Problem: Safety - Adult  Goal: Free from fall injury  Outcome: Progressing     Problem: ABCDS Injury Assessment  Goal: Absence of physical injury  Outcome: Progressing     Problem: Genitourinary - Adult  Goal: Absence of urinary retention  Outcome: Progressing     Problem: Genitourinary - Adult  Goal: Urinary catheter remains patent  Outcome: Progressing     Problem: Nutrition Deficit:  Goal: Optimize nutritional status  Outcome: Progressing

## 2022-12-14 NOTE — CARE COORDINATION
Social work [facility] can accept pt late afternoon if ale,Rx and discharge order or snf are in. [facility]

## 2022-12-15 ENCOUNTER — ANESTHESIA EVENT (OUTPATIENT)
Dept: OPERATING ROOM | Age: 82
End: 2022-12-15
Payer: MEDICARE

## 2022-12-15 ENCOUNTER — ANESTHESIA (OUTPATIENT)
Dept: OPERATING ROOM | Age: 82
End: 2022-12-15
Payer: MEDICARE

## 2022-12-15 LAB
ABSOLUTE EOS #: 0.07 K/UL (ref 0–0.44)
ABSOLUTE IMMATURE GRANULOCYTE: 0.05 K/UL (ref 0–0.3)
ABSOLUTE LYMPH #: 1.08 K/UL (ref 1.1–3.7)
ABSOLUTE MONO #: 1.35 K/UL (ref 0.1–1.2)
ALBUMIN SERPL-MCNC: 2.3 G/DL (ref 3.5–5.2)
ALP BLD-CCNC: 81 U/L (ref 40–129)
ALT SERPL-CCNC: 14 U/L (ref 5–41)
ANION GAP SERPL CALCULATED.3IONS-SCNC: 8 MMOL/L (ref 9–17)
AST SERPL-CCNC: 27 U/L
BASOPHILS # BLD: 1 % (ref 0–2)
BASOPHILS ABSOLUTE: 0.05 K/UL (ref 0–0.2)
BILIRUB SERPL-MCNC: 0.3 MG/DL (ref 0.3–1.2)
BUN BLDV-MCNC: 3 MG/DL (ref 8–23)
BUN/CREAT BLD: 5 (ref 9–20)
CALCIUM SERPL-MCNC: 8.1 MG/DL (ref 8.6–10.4)
CHLORIDE BLD-SCNC: 101 MMOL/L (ref 98–107)
CO2: 26 MMOL/L (ref 20–31)
CREAT SERPL-MCNC: 0.62 MG/DL (ref 0.7–1.2)
EOSINOPHILS RELATIVE PERCENT: 1 % (ref 1–4)
GFR SERPL CREATININE-BSD FRML MDRD: >60 ML/MIN/1.73M2
GLUCOSE BLD-MCNC: 120 MG/DL (ref 70–99)
HCT VFR BLD CALC: 39.1 % (ref 40.7–50.3)
HEMOGLOBIN: 11.9 G/DL (ref 13–17)
IMMATURE GRANULOCYTES: 1 %
LYMPHOCYTES # BLD: 11 % (ref 24–43)
LYMPHOCYTES, BODY FLUID: 47 %
MAGNESIUM: 1.7 MG/DL (ref 1.6–2.6)
MCH RBC QN AUTO: 27.5 PG (ref 25.2–33.5)
MCHC RBC AUTO-ENTMCNC: 30.4 G/DL (ref 28.4–34.8)
MCV RBC AUTO: 90.5 FL (ref 82.6–102.9)
MONOCYTES # BLD: 14 % (ref 3–12)
NEUTROPHIL, FLUID: 29 %
NRBC AUTOMATED: 0 PER 100 WBC
OTHER CELLS FLUID: NORMAL %
PDW BLD-RTO: 13.4 % (ref 11.8–14.4)
PLATELET # BLD: 333 K/UL (ref 138–453)
PMV BLD AUTO: 10.7 FL (ref 8.1–13.5)
POTASSIUM SERPL-SCNC: 3.4 MMOL/L (ref 3.7–5.3)
RBC # BLD: 4.32 M/UL (ref 4.21–5.77)
RBC FLUID: 6000 /MM3
SEG NEUTROPHILS: 72 % (ref 36–65)
SEGMENTED NEUTROPHILS ABSOLUTE COUNT: 7.3 K/UL (ref 1.5–8.1)
SODIUM BLD-SCNC: 135 MMOL/L (ref 135–144)
SPECIMEN TYPE: NORMAL
TOTAL PROTEIN: 5.2 G/DL (ref 6.4–8.3)
WBC # BLD: 9.9 K/UL (ref 3.5–11.3)
WBC FLUID: 615 /MM3

## 2022-12-15 PROCEDURE — 6360000002 HC RX W HCPCS: Performed by: FAMILY MEDICINE

## 2022-12-15 PROCEDURE — 2580000003 HC RX 258: Performed by: INTERNAL MEDICINE

## 2022-12-15 PROCEDURE — 6370000000 HC RX 637 (ALT 250 FOR IP): Performed by: INTERNAL MEDICINE

## 2022-12-15 PROCEDURE — 2709999900 HC NON-CHARGEABLE SUPPLY: Performed by: INTERNAL MEDICINE

## 2022-12-15 PROCEDURE — 80053 COMPREHEN METABOLIC PANEL: CPT

## 2022-12-15 PROCEDURE — 83735 ASSAY OF MAGNESIUM: CPT

## 2022-12-15 PROCEDURE — 99233 SBSQ HOSP IP/OBS HIGH 50: CPT | Performed by: INTERNAL MEDICINE

## 2022-12-15 PROCEDURE — 88305 TISSUE EXAM BY PATHOLOGIST: CPT

## 2022-12-15 PROCEDURE — 2580000003 HC RX 258: Performed by: FAMILY MEDICINE

## 2022-12-15 PROCEDURE — 2580000003 HC RX 258: Performed by: NURSE ANESTHETIST, CERTIFIED REGISTERED

## 2022-12-15 PROCEDURE — 3700000001 HC ADD 15 MINUTES (ANESTHESIA): Performed by: INTERNAL MEDICINE

## 2022-12-15 PROCEDURE — 2060000000 HC ICU INTERMEDIATE R&B

## 2022-12-15 PROCEDURE — 3700000000 HC ANESTHESIA ATTENDED CARE: Performed by: INTERNAL MEDICINE

## 2022-12-15 PROCEDURE — 7100000000 HC PACU RECOVERY - FIRST 15 MIN: Performed by: INTERNAL MEDICINE

## 2022-12-15 PROCEDURE — 36415 COLL VENOUS BLD VENIPUNCTURE: CPT

## 2022-12-15 PROCEDURE — 3609012400 HC EGD TRANSORAL BIOPSY SINGLE/MULTIPLE: Performed by: INTERNAL MEDICINE

## 2022-12-15 PROCEDURE — 0DB48ZX EXCISION OF ESOPHAGOGASTRIC JUNCTION, VIA NATURAL OR ARTIFICIAL OPENING ENDOSCOPIC, DIAGNOSTIC: ICD-10-PCS | Performed by: INTERNAL MEDICINE

## 2022-12-15 PROCEDURE — 2500000003 HC RX 250 WO HCPCS: Performed by: NURSE ANESTHETIST, CERTIFIED REGISTERED

## 2022-12-15 PROCEDURE — 7100000001 HC PACU RECOVERY - ADDTL 15 MIN: Performed by: INTERNAL MEDICINE

## 2022-12-15 PROCEDURE — 6360000002 HC RX W HCPCS: Performed by: NURSE ANESTHETIST, CERTIFIED REGISTERED

## 2022-12-15 PROCEDURE — 85025 COMPLETE CBC W/AUTO DIFF WBC: CPT

## 2022-12-15 RX ORDER — SODIUM CHLORIDE 0.9 % (FLUSH) 0.9 %
5-40 SYRINGE (ML) INJECTION EVERY 12 HOURS SCHEDULED
Status: DISCONTINUED | OUTPATIENT
Start: 2022-12-15 | End: 2022-12-20 | Stop reason: HOSPADM

## 2022-12-15 RX ORDER — SODIUM CHLORIDE 9 MG/ML
INJECTION, SOLUTION INTRAVENOUS PRN
Status: DISCONTINUED | OUTPATIENT
Start: 2022-12-15 | End: 2022-12-20 | Stop reason: HOSPADM

## 2022-12-15 RX ORDER — PROPOFOL 10 MG/ML
INJECTION, EMULSION INTRAVENOUS PRN
Status: DISCONTINUED | OUTPATIENT
Start: 2022-12-15 | End: 2022-12-15 | Stop reason: SDUPTHER

## 2022-12-15 RX ORDER — LIDOCAINE HYDROCHLORIDE 20 MG/ML
INJECTION, SOLUTION EPIDURAL; INFILTRATION; INTRACAUDAL; PERINEURAL PRN
Status: DISCONTINUED | OUTPATIENT
Start: 2022-12-15 | End: 2022-12-15 | Stop reason: SDUPTHER

## 2022-12-15 RX ORDER — SODIUM CHLORIDE 9 MG/ML
INJECTION, SOLUTION INTRAVENOUS CONTINUOUS PRN
Status: DISCONTINUED | OUTPATIENT
Start: 2022-12-15 | End: 2022-12-15 | Stop reason: SDUPTHER

## 2022-12-15 RX ORDER — SODIUM CHLORIDE 0.9 % (FLUSH) 0.9 %
5-40 SYRINGE (ML) INJECTION PRN
Status: DISCONTINUED | OUTPATIENT
Start: 2022-12-15 | End: 2022-12-20 | Stop reason: HOSPADM

## 2022-12-15 RX ADMIN — DONEPEZIL HYDROCHLORIDE 10 MG: 10 TABLET, FILM COATED ORAL at 12:33

## 2022-12-15 RX ADMIN — PROPOFOL 50 MG: 10 INJECTION, EMULSION INTRAVENOUS at 11:09

## 2022-12-15 RX ADMIN — Medication 9 MG: at 19:35

## 2022-12-15 RX ADMIN — SODIUM CHLORIDE: 9 INJECTION, SOLUTION INTRAVENOUS at 10:58

## 2022-12-15 RX ADMIN — PROPOFOL 50 MG: 10 INJECTION, EMULSION INTRAVENOUS at 11:06

## 2022-12-15 RX ADMIN — SUCRALFATE 1 G: 1 TABLET ORAL at 19:36

## 2022-12-15 RX ADMIN — AMITRIPTYLINE HYDROCHLORIDE 25 MG: 25 TABLET, FILM COATED ORAL at 19:36

## 2022-12-15 RX ADMIN — PROPOFOL 50 MG: 10 INJECTION, EMULSION INTRAVENOUS at 11:04

## 2022-12-15 RX ADMIN — SUCRALFATE 1 G: 1 TABLET ORAL at 12:32

## 2022-12-15 RX ADMIN — FINASTERIDE 5 MG: 5 TABLET, FILM COATED ORAL at 12:33

## 2022-12-15 RX ADMIN — PREGABALIN 100 MG: 100 CAPSULE ORAL at 19:36

## 2022-12-15 RX ADMIN — ROSUVASTATIN CALCIUM 10 MG: 10 TABLET, FILM COATED ORAL at 12:32

## 2022-12-15 RX ADMIN — POTASSIUM CHLORIDE 10 MEQ: 7.46 INJECTION, SOLUTION INTRAVENOUS at 07:51

## 2022-12-15 RX ADMIN — PREGABALIN 100 MG: 100 CAPSULE ORAL at 12:33

## 2022-12-15 RX ADMIN — FAMOTIDINE 40 MG: 20 TABLET, FILM COATED ORAL at 12:32

## 2022-12-15 RX ADMIN — FAMOTIDINE 40 MG: 20 TABLET, FILM COATED ORAL at 19:36

## 2022-12-15 RX ADMIN — ACETAMINOPHEN 650 MG: 325 TABLET ORAL at 15:32

## 2022-12-15 RX ADMIN — DONEPEZIL HYDROCHLORIDE 10 MG: 10 TABLET, FILM COATED ORAL at 19:36

## 2022-12-15 RX ADMIN — DEXTROSE AND SODIUM CHLORIDE: 5; 450 INJECTION, SOLUTION INTRAVENOUS at 21:56

## 2022-12-15 RX ADMIN — Medication 400 MG: at 12:32

## 2022-12-15 RX ADMIN — DEXTROSE AND SODIUM CHLORIDE: 5; 450 INJECTION, SOLUTION INTRAVENOUS at 12:40

## 2022-12-15 RX ADMIN — SODIUM CHLORIDE, PRESERVATIVE FREE 10 ML: 5 INJECTION INTRAVENOUS at 12:33

## 2022-12-15 RX ADMIN — LIDOCAINE HYDROCHLORIDE 60 MG: 20 INJECTION, SOLUTION EPIDURAL; INFILTRATION; INTRACAUDAL; PERINEURAL at 11:04

## 2022-12-15 RX ADMIN — DEXTROSE AND SODIUM CHLORIDE: 5; 450 INJECTION, SOLUTION INTRAVENOUS at 06:54

## 2022-12-15 RX ADMIN — POTASSIUM CHLORIDE 10 MEQ: 7.46 INJECTION, SOLUTION INTRAVENOUS at 09:32

## 2022-12-15 RX ADMIN — POTASSIUM CHLORIDE 10 MEQ: 7.46 INJECTION, SOLUTION INTRAVENOUS at 06:34

## 2022-12-15 ASSESSMENT — PAIN SCALES - GENERAL
PAINLEVEL_OUTOF10: 3
PAINLEVEL_OUTOF10: 3

## 2022-12-15 NOTE — PROGRESS NOTES
Physical Therapy  DATE: 12/15/2022    NAME: Desirae Carbajal  MRN: [id number]   : 1940    Patient not seen this date for Physical Therapy due to:      [] Cancel by RN or physician due to:    [] Hemodialysis    [] Critical Lab Value Level     [] Blood transfusion in progress    [] Acute or unstable cardiovascular status   _MAP < 55 or more than >115  _HR < 40 or > 130    [] Acute or unstable pulmonary status   -FiO2 > 60%   _RR < 5 or >40    _O2 sats < 85%    [] Strict Bedrest    [x] Off Unit for surgery or procedure    [] Off Unit for testing       [] Pending imaging to R/O fracture    [] Refusal by Patient      [] Other      [] PT being discontinued at this time. Patient independent. No further needs. [] PT being discontinued at this time as the patient has been transferred to hospice care. No further needs.       Kate Bynum, PTA

## 2022-12-15 NOTE — FLOWSHEET NOTE
Pt back from procedure. Seems to be a little agitated stating they were pushing on his stomach and wants names. Writer was able to turn his mood around a little. Vitals as below. Wife and daughter still at bedside.         12/15/22 1201   Vital Signs   Temp 97.7 °F (36.5 °C)   Heart Rate 81   Resp 18   /71   MAP (Calculated) 86   MAP (mmHg) 84   BP Location Left upper arm   BP Method Automatic   Level of Consciousness 0   MEWS Score 1

## 2022-12-15 NOTE — OP NOTE
PROCEDURE NOTE    DATE OF PROCEDURE: 12/15/2022     SURGEON: Shakir Kinsey MD    ASSISTANT: None    PREOPERATIVE DIAGNOSIS: WEIGHT LOSS  ABD PAINS  DYSPHAGIA  EXUDATIVE ASCITES     POSTOPERATIVE DIAGNOSIS: As described below    OPERATION: Upper GI endoscopy with Biopsy    ANESTHESIA: MAC PER ANESTHESIA     ESTIMATED BLOOD LOSS: Less than 50 ml    COMPLICATIONS: None. SPECIMENS:  Was Obtained:     HISTORY: The patient is a 81y.o. year old male with history of above preop diagnosis. I recommended esophagogastroduodenoscopy with possible biopsy and I explained the risk, benefits, expected outcome, and alternatives to the procedure. Risks included but are not limited to bleeding, infection, respiratory distress, hypotension, and perforation of the esophagus, stomach, or duodenum. Patient understands and is in agreement. PROCEDURE: The patient was given IV conscious sedation. The patient's SPO2 remained above 90% throughout the procedure. The gastroscope was inserted orally and advanced under direct vision through the esophagus, through the stomach, through the pylorus, and into the descending duodenum. Findings:    Retropharyngeal area was grossly normal appearing    Esophagus: abnormal: EVIDENCE OF MASS EFFECT AND NARROWING AT THE GEJ  RETROFLEXION AT THE CARDIA REVEALED MASS EFFECT AS WELL  MULTIPLE BIOPSIES AND PICTURES WERE TAKEN    Stomach:    Fundus: abnormal: AS ABOVE    Body: normal    Antrum: normal    Duodenum:     Descending: normal    Bulb: normal    The scope was removed and the patient tolerated the procedure well.      Recommendations/Plan:   F/U Biopsies  CLEARS TO FULL  Discussed with the family  Post sedation patient was stable with stable vital signs and stable O2 saturations    Electronically signed by Shakir Kinsey MD  on 12/15/2022 at 11:12 AM

## 2022-12-15 NOTE — PLAN OF CARE
Pt remained safe and free from falls throughout shift. 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 K+ replaced this morning for a 3.4 result. Albright still in place and thus far in shift 925ml out. D5% and 0.45%  @ 75ml/hr. Vitals have been stable. Reporting pain so gave Tylenol @ 3:30pm. Family at bedside and emotional d/t results today. Accepted to [facility]. Bed alarm on, bed in lowest position and call light in reach. Writer will continue to monitor pt. Problem: Discharge Planning  Goal: Discharge to home or other facility with appropriate resources  12/15/2022 1640 by Rob Rush RN  Outcome: Progressing  Flowsheets (Taken 12/15/2022 1640)  Discharge to home or other facility with appropriate resources:   Identify barriers to discharge with patient and caregiver   Arrange for needed discharge resources and transportation as appropriate     Problem: Skin/Tissue Integrity  Goal: Absence of new skin breakdown  Description: 1. Monitor for areas of redness and/or skin breakdown  2. Assess vascular access sites hourly  3. Every 4-6 hours minimum:  Change oxygen saturation probe site  4. Every 4-6 hours:  If on nasal continuous positive airway pressure, respiratory therapy assess nares and determine need for appliance change or resting period.   12/15/2022 1640 by Rob Rush RN  Outcome: Progressing     Problem: Safety - Adult  Goal: Free from fall injury  12/15/2022 1640 by Rob Rush RN  Outcome: Progressing  Flowsheets (Taken 12/15/2022 1640)  Free From Fall Injury: Instruct family/caregiver on patient safety     Problem: ABCDS Injury Assessment  Goal: Absence of physical injury  12/15/2022 1640 by Rob Rush RN  Outcome: Progressing  Flowsheets (Taken 12/15/2022 1640)  Absence of Physical Injury: Implement safety measures based on patient assessment     Problem: Genitourinary - Adult  Goal: Absence of urinary retention  12/15/2022 1640 by Rob Rush RN  Outcome: Progressing  Flowsheets (Taken 12/15/2022 1640)  Absence of urinary retention:   Assess patients ability to void and empty bladder   Monitor intake/output and perform bladder scan as needed     Problem: Genitourinary - Adult  Goal: Urinary catheter remains patent  12/15/2022 1640 by Annmarie Dominguez RN  Outcome: Progressing     Problem: Nutrition Deficit:  Goal: Optimize nutritional status  12/15/2022 1640 by Annmarie Dominguez RN  Outcome: Progressing  Flowsheets (Taken 12/15/2022 1640)  Nutrient intake appropriate for improving, restoring, or maintaining nutritional needs:   Assess nutritional status and recommend course of action   Monitor oral intake, labs, and treatment plans     Problem: Pain  Goal: Verbalizes/displays adequate comfort level or baseline comfort level  Outcome: Progressing

## 2022-12-15 NOTE — PROGRESS NOTES
_                         Today's Date: 12/15/2022  Patient Name: Elizabeth Gonzalez  Date of admission: 12/9/2022  5:11 PM  Patient's age: 81 y.o., 1940  Admission Dx: Diverticulosis of colon [K57.30]  Other ascites [R18.8]  Lower abdominal pain [R10.30]  Failure to thrive in adult [R62.7]  Ascites of liver [R18.8]      Requesting Physician: Faby Loaiza MD    CHIEF COMPLAINT: Abdominal pain. Weight loss. Ascites. SUBJECTIVE:  . Patient was seen and examined in the morning. Clinically stable. No events overnight. Family at bedside. Continues to have abdominal discomfort. Mental status with no changes. No fever. No GI bleeding. BRIEF CASE HISTORY:    The patient is a 81 y.o.  male who is admitted to the hospital for further management of increasing abdominal pain and failure to thrive. Patient is unable to give any history. History is obtained from the chart and talking to the wife and daughter. Patient presented to [facility] due to worsening abdominal pain and abdominal distention since October. He had weight loss and decreased appetite. Patient had recurrent episodes of vomiting yellow mucus and multiple episodes of diarrhea. He had no fever. He had abdominal CT scan which showed ascites. There was no perforation. No bowel obstruction. Patient had evaluation by ID and by gastroenterology. He will have EGD tomorrow. Last colonoscopy was 2017. Patient had multiple comorbidities including coronary artery disease and benign prostate hypertrophy as well as hyperlipidemia. And history of Alzheimer disease.     Past Medical History:   has a past medical history of Bladder stone, CAD (coronary artery disease), Diverticulitis, GERD (gastroesophageal reflux disease), Hyperlipidemia, Hypertension, Intermittent self-catheterization of bladder, LBBB (left bundle branch block), MI (myocardial infarction) ([facility] [address] Neuropathy, Polio, and Wears glasses. Past Surgical History:   has a past surgical history that includes Coronary artery bypass graft (2000); Coronary angioplasty with stent; Cardiac surgery (2000); Colonoscopy; Endoscopy, colon, diagnostic; Bladder stone removal; Esophagogastroduodenoscopy (12/15/2022); and Upper gastrointestinal endoscopy (N/A, 12/15/2022). Family History: family history is not on file. Social History:   reports that he has never smoked. He has never used smokeless tobacco. He reports that he does not drink alcohol and does not use drugs. Medications:    Prior to Admission medications    Medication Sig Start Date End Date Taking? Authorizing Provider   cefdinir (OMNICEF) 300 MG capsule Take 300 mg by mouth 2 times daily 14-day course filled 12/8/22   Yes Historical Provider, MD   dicyclomine (BENTYL) 10 MG capsule Take 10 mg by mouth 2 times daily as needed   Yes Historical Provider, MD   omeprazole (PRILOSEC) 40 MG delayed release capsule Take 40 mg by mouth daily   Yes Historical Provider, MD   metroNIDAZOLE (FLAGYL) 500 MG tablet Take 500 mg by mouth 2 times daily 24-day course filled 12/1/22   Yes Historical Provider, MD   pregabalin (LYRICA) 100 MG capsule Take 100 mg by mouth 2 times daily.     Historical Provider, MD   finasteride (PROSCAR) 5 MG tablet Take 5 mg by mouth daily    Historical Provider, MD   donepezil (ARICEPT) 10 MG tablet Take 10 mg by mouth 2 times daily    Historical Provider, MD   amitriptyline (ELAVIL) 25 MG tablet Take 25-50 mg by mouth nightly    Historical Provider, MD   rosuvastatin (CRESTOR) 10 MG tablet Take 10 mg by mouth daily    Historical Provider, MD   ondansetron (ZOFRAN) 4 MG tablet Take 4 mg by mouth every 8 hours as needed for Nausea or Vomiting    Historical Provider, MD   Melatonin 10 MG CAPS Take 10-20 mg by mouth nightly    Historical Provider, MD   famotidine (PEPCID) 40 MG tablet Take 40 mg by mouth 2 times daily    Historical Provider, MD   acetaminophen (TYLENOL) 500 MG tablet Take 500 mg by mouth every 6 hours as needed prn    Historical Provider, MD   Magnesium 400 MG TABS Take 400 mg by mouth Daily    Historical Provider, MD   vitamin D-3 (CHOLECALCIFEROL) 125 MCG (5000 UT) TABS Take 5,000 Units by mouth daily    Historical Provider, MD   Cyanocobalamin 2500 MCG TABS Take 5,000 mcg by mouth Daily    Historical Provider, MD   nitroGLYCERIN (NITROSTAT) 0.4 MG SL tablet Place 0.4 mg under the tongue every 5 minutes as needed for Chest pain up to max of 3 total doses. If no relief after 1 dose, call 911.  Using as needed but has not needed them    Historical Provider, MD   clopidogrel (PLAVIX) 75 MG tablet Take 75 mg by mouth daily    Historical Provider, MD   Diphenhydramine-APAP, sleep, (TYLENOL PM EXTRA STRENGTH PO) Take 1 tablet by mouth nightly as needed    Historical Provider, MD     Current Facility-Administered Medications   Medication Dose Route Frequency Provider Last Rate Last Admin    sodium chloride flush 0.9 % injection 5-40 mL  5-40 mL IntraVENous 2 times per day Noemi Rivas MD        sodium chloride flush 0.9 % injection 5-40 mL  5-40 mL IntraVENous PRN Noemi Rivas MD        0.9 % sodium chloride infusion   IntraVENous PRN Noemi Rivas MD        0.9 % sodium chloride bolus  50 mL IntraVENous Once Dilan Kent MD        famotidine (PEPCID) tablet 40 mg  40 mg Oral BID Dilan Kent MD   40 mg at 12/15/22 1232    rosuvastatin (CRESTOR) tablet 10 mg  10 mg Oral Daily Dilan Kent MD   10 mg at 12/15/22 1232    pantoprazole (PROTONIX) tablet 40 mg  40 mg Oral QAM AC Dilan Kent MD   40 mg at 12/14/22 0518    dextrose 5 % and 0.45 % sodium chloride infusion   IntraVENous Continuous Dilan Kent MD 75 mL/hr at 12/15/22 1240 New Bag at 12/15/22 1240    potassium chloride 10 mEq/100 mL IVPB (Peripheral Line)  10 mEq IntraVENous PRN Dilan Kent  mL/hr at 12/15/22 0932 10 mEq at 12/15/22 0932    amitriptyline (ELAVIL) tablet 25 mg  25 mg Oral Nightly Alec Waldrop MD   25 mg at 22    donepezil (ARICEPT) tablet 10 mg  10 mg Oral BID Alec Waldrop MD   10 mg at 12/15/22 1233    finasteride (PROSCAR) tablet 5 mg  5 mg Oral Daily Alec Waldrop MD   5 mg at 12/15/22 1233    magnesium oxide (MAG-OX) tablet 400 mg  400 mg Oral Daily Alec Waldrop MD   400 mg at 12/15/22 123    melatonin tablet 9 mg  9 mg Oral Nightly Alec Waldrop MD   9 mg at 22    nitroGLYCERIN (NITROSTAT) SL tablet 0.4 mg  0.4 mg SubLINGual Q5 Min PRN Alec Waldrop MD        pregabalin (LYRICA) capsule 100 mg  100 mg Oral BID Alec Waldrop MD   100 mg at 12/15/22 1233    sucralfate (CARAFATE) tablet 1 g  1 g Oral 4x Daily Alec Waldrop MD   1 g at 12/15/22 123    enoxaparin (LOVENOX) injection 40 mg  40 mg SubCUTAneous Daily Alec Waldrop MD   40 mg at 22 3734    magnesium sulfate 1000 mg in dextrose 5% 100 mL IVPB  1,000 mg IntraVENous PRN Alec Waldrop MD        ondansetron (ZOFRAN-ODT) disintegrating tablet 4 mg  4 mg Oral Q8H PRN Alec Waldrop MD        Or    ondansetron [facility]) injection 4 mg  4 mg IntraVENous Q6H PRN Alec Walrdop MD   4 mg at 22 1346    polyethylene glycol (GLYCOLAX) packet 17 g  17 g Oral Daily PRN Alec Waldrop MD        acetaminophen (TYLENOL) tablet 650 mg  650 mg Oral Q6H PRN Alec Waldrop MD   650 mg at 12/15/22 1532    Or    acetaminophen (TYLENOL) suppository 650 mg  650 mg Rectal Q6H PRN Alec Waldrop MD           Allergies:  Ciprofloxacin, Codeine, Morphine, Pletal [cilostazol], Sulfamethoxazole-trimethoprim, and Pcn [penicillins]    REVIEW OF SYSTEMS:      Difficult to obtain from the patient. Discussion with the wife and daughter. No other complaints about from was mentioned above.     PHYSICAL EXAM:      /73   Pulse 68   Temp 98.2 °F (36.8 °C) (Oral)   Resp 18   Ht 5' 9\" (1.753 m)   Wt 156 lb (70.8 kg)   SpO2 94%   BMI 23.04 kg/m²    Temp (24hrs), Av.2 °F (36.8 °C), Min:97.3 °F (36.3 °C), Max:98.6 °F (37 °C)      General appearance - not in pain or distress. Looks cachectic. Mental status -patient is sleepy at the time of my exam.  Eyes - pupils equal and reactive, extraocular eye movements intact  Ears - bilateral TM's and external ear canals normal  Nose - normal and patent, no erythema, discharge or polyps  Mouth - mucous membranes moist, pharynx normal without lesions  Neck - supple, no significant adenopathy  Lymphatics - no palpable lymphadenopathy, no hepatosplenomegaly  Chest - clear to auscultation, no wheezes, rales or rhonchi, symmetric air entry  Heart - normal rate, regular rhythm, normal S1, S2, no murmurs, rubs, clicks or gallops  Abdomen -generalized abdominal tenderness. , no masses or organomegaly  Neurological -no apparent focal neurological deficit  Musculoskeletal - no joint tenderness, deformity or swelling  Extremities - peripheral pulses normal, no pedal edema, no clubbing or cyanosis  Skin - normal coloration and turgor, no rashes, no suspicious skin lesions noted           DATA:      Labs:       CBC:   Recent Labs     12/14/22  0527 12/15/22  0514   WBC 9.1 9.9   HGB 12.0* 11.9*   HCT 38.3* 39.1*    333     BMP:   Recent Labs     12/14/22  0527 12/15/22  0514    135   K 3.6* 3.4*   CO2 27 26   BUN 4* 3*   CREATININE 0.72 0.62*   LABGLOM >60 >60   GLUCOSE 119* 120*     PT/INR: No results for input(s): PROTIME, INR in the last 72 hours. APTT:No results for input(s): APTT in the last 72 hours.   LIVER PROFILE:  Recent Labs     12/14/22  0527 12/15/22  0514   AST 34 27   ALT 16 14   LABALBU 2.5* 2.3*     MRI ABDOMEN W WO CONTRAST MRCP  Narrative: EXAMINATION:  MRI OF THE ABDOMEN WITH AND WITHOUT CONTRAST AND MRCP 12/14/2022 3:02 pm    TECHNIQUE:  Multiplanar multisequence MRI of the abdomen was performed without and with  the administration of 13 mL ProHance intravenous contrast.  After initial T2  axial and coronal images, thick slab, thin slab and 3D coronal MRCP sequences  were obtained without the administration of intravenous contrast.  MIP images  are provided for review. COMPARISON:  CT on 12/09/2022    HISTORY:  Persistent abdominal pain and ascites, concern for malignancy. FINDINGS:  Image quality degraded by motion artifact. No hepatic steatosis. No liver lesion seen. Hepatic vasculature is patent. Mild-moderate ascites. Diffuse omental reticulation at the level of the  kidneys (series 1103 image 105) with associated mild enhancement. Gallbladder is unremarkable. No biliary or pancreatic ductal dilatation. Spleen size at the upper limit of normal.  Pancreas and adrenals are  unremarkable. Kidneys demonstrate symmetric enhancement without  hydronephrosis. A large simple cyst at the inferior pole of the left kidney  measures 9.2 x 7.9 cm. No significant lymphadenopathy. Mild bowel wall  thickening in the right abdomen as seen on recent CT. Extensive colonic  diverticulosis. Osseous structures are unremarkable. Atherosclerotic  disease of the abdominal aorta without aneurysm. Bilateral gynecomastia. Susceptibility artifact from sternotomy changes. Impression: 1. Diffuse omental reticulation with associated mild enhancement. While this  could relate to the presence of mild-moderate ascites, the possibility of  omental carcinomatosis cannot be excluded. Consider omental biopsy for  further evaluation. 2. No other findings to suggest malignancy within the abdomen.             IMPRESSION:    Primary Problem  Other ascites    Active Hospital Problems    Diagnosis Date Noted    Severe malnutrition ([facility] [address] [E43] 12/12/2022     Priority: Medium    Lower abdominal pain [R10.30] 12/10/2022     Priority: Medium    Diffuse abdominal pain [R10.84] 12/10/2022     Priority: Medium    Failure to thrive in adult [R62.7] 12/10/2022     Priority: Medium    Anorexia [R63.0] 12/10/2022     Priority: Medium    Other ascites [R18.8] 12/09/2022 Priority: Medium       RECOMMENDATIONS:  Records and labs and images were reviewed and discussed with the family. Patient was also evaluated by multiple other specialties. Appreciate the urology, ID and GI input. Explained to the family the concern about his weight loss and his GI symptoms with unexplained ascites and the possibility for possible underlying malignancy. Tumor markers are negative. MRI was reviewed and explained to the family. Concerning findings with suspicious omental nodules. Likely malignant. Patient is for EGD today. We will wait for final results of the EGD. If totally normal we will then consider CT-guided needle biopsy of the omental nodules. Addendum:  EGD results were reviewed. There is evidence of mass-effect and narrowing at the GE junction. Biopsies were taken. We will wait for final pathology results for further recommendations. Discussed with family     Ann Oden MD, MD                            [address] [place] [address] [facility]                            This note is created with the assistance of a speech recognition program.  While intending to generate a document that actually reflects the content of the visit, the document can still have some errors including those of syntax and sound a like substitutions which may escape proof reading. It such instances, actual meaning can be extrapolated by contextual diversion.

## 2022-12-15 NOTE — PROGRESS NOTES
Oliver Hernandez   Urology Progress Note            Subjective: Follow-up urinary retention bladder outlet obstruction    Patient Vitals for the past 24 hrs:   BP Temp Temp src Pulse Resp SpO2   12/14/22 2005 118/73 98.6 °F (37 °C) Oral 53 16 92 %   12/14/22 1622 -- 99.5 °F (37.5 °C) -- -- -- 97 %   12/14/22 1621 122/72 99.5 °F (37.5 °C) Oral 75 19 97 %   12/14/22 1159 112/65 97.3 °F (36.3 °C) Oral 95 18 97 %   12/14/22 0728 115/76 98.1 °F (36.7 °C) Oral 62 18 93 %   12/14/22 0433 123/64 98.4 °F (36.9 °C) Temporal 85 16 94 %   12/14/22 0015 119/63 99 °F (37.2 °C) Axillary 68 18 94 %       Intake/Output Summary (Last 24 hours) at 12/14/2022 2115  Last data filed at 12/14/2022 9171  Gross per 24 hour   Intake --   Output 1250 ml   Net -1250 ml       Recent Labs     12/12/22  0511 12/13/22  0513 12/14/22  0527   WBC 10.4 8.8 9.1   HGB 11.3* 11.9* 12.0*   HCT 36.0* 38.0* 38.3*   MCV 90.0 89.6 89.3    314 325     Recent Labs     12/12/22 0511 12/13/22 0513 12/14/22  0527    137 137   K 3.8 3.5* 3.6*    102 102   CO2 25 26 27   BUN 6* 4* 4*   CREATININE 0.80 0.73 0.72       No results for input(s): COLORU, PHUR, LABCAST, WBCUA, RBCUA, MUCUS, TRICHOMONAS, YEAST, BACTERIA, CLARITYU, SPECGRAV, LEUKOCYTESUR, UROBILINOGEN, BILIRUBINUR, BLOODU in the last 72 hours.     Invalid input(s): NITRATE, GLUCOSEUKETONESUAMORPHOUS    Additional Lab/culture results:    Physical Exam: Patient in bed, family at bedside he denies any significant pain but he still has tenderness in the lower abdomen suprapubic area and right and left lower quadrant    Interval Imaging Findings: Chest x-ray revealed no acute cardiopulmonary abnormalities abdominal MRI pending    Impression:    Patient Active Problem List   Diagnosis    Neuropathy of both feet    LBBB (left bundle branch block)    S/P CABG (coronary artery bypass graft)    Unstable angina (HCC)    S/P cardiac cath    S/P ICD (internal cardiac defibrillator) procedure    Other ascites    Lower abdominal pain    Diffuse abdominal pain    Failure to thrive in adult    Anorexia    Severe malnutrition (HCC)       Plan: Discussed with patient and family, maintain indwelling [facility] for the time being, await oncology evaluation Dr. Kristan Velasquez MD  9:15 PM 12/14/2022

## 2022-12-15 NOTE — ANESTHESIA POSTPROCEDURE EVALUATION
Department of Anesthesiology  Postprocedure Note    Patient: Ofelia Neumann  MRN: [id number]  YOB: 1940  Date of evaluation: 12/15/2022      Procedure Summary     Date: 12/15/22 Room / Location: [place] [address] / [facility] - INPATIENT    Anesthesia Start: 3337 Anesthesia Stop: 1120    Procedure: EGD BIOPSY Diagnosis:       Dysphagia, unspecified type      (Dysphagia, unspecified type [R13.10])    Surgeons: Boubacar Curry MD Responsible Provider: Gracia Leon MD    Anesthesia Type: General ASA Status: 3          Anesthesia Type: General    Vy Phase I: Vy Score: 6    Vy Phase II:        Anesthesia Post Evaluation    Patient location during evaluation: PACU  Patient participation: complete - patient participated  Level of consciousness: awake  Airway patency: patent  Nausea & Vomiting: no nausea and no vomiting  Complications: no  Cardiovascular status: hemodynamically stable  Respiratory status: acceptable  Hydration status: stable  There was medical reason for not using a multimodal analgesia pain management approach.

## 2022-12-15 NOTE — PROGRESS NOTES
Nelly Samuels   Urology Progress Note            Subjective: follow-up urinary retention lower abdominal pain    Patient Vitals for the past 24 hrs:   BP Temp Temp src Pulse Resp SpO2 Weight   12/15/22 0741 123/77 98.4 °F (36.9 °C) Oral 67 18 93 % --   12/15/22 0623 -- -- -- -- -- -- 156 lb (70.8 kg)   12/15/22 0526 131/70 98.4 °F (36.9 °C) -- (!) 103 16 92 % --   12/15/22 0202 120/76 98.4 °F (36.9 °C) Temporal 61 18 92 % --   12/14/22 2005 118/73 98.6 °F (37 °C) Oral 53 16 92 % --   12/14/22 1622 -- 99.5 °F (37.5 °C) -- -- -- 97 % --   12/14/22 1621 122/72 99.5 °F (37.5 °C) Oral 75 19 97 % --   12/14/22 1159 112/65 97.3 °F (36.3 °C) Oral 95 18 97 % --       Intake/Output Summary (Last 24 hours) at 12/15/2022 0752  Last data filed at 12/15/2022 0545  Gross per 24 hour   Intake --   Output 1050 ml   Net -1050 ml       Recent Labs     12/13/22  0513 12/14/22  0527 12/15/22  0514   WBC 8.8 9.1 9.9   HGB 11.9* 12.0* 11.9*   HCT 38.0* 38.3* 39.1*   MCV 89.6 89.3 90.5    325 333     Recent Labs     12/13/22  0513 12/14/22  0527 12/15/22  0514    137 135   K 3.5* 3.6* 3.4*    102 101   CO2 26 27 26   BUN 4* 4* 3*   CREATININE 0.73 0.72 0.62*       No results for input(s): COLORU, PHUR, LABCAST, WBCUA, RBCUA, MUCUS, TRICHOMONAS, YEAST, BACTERIA, CLARITYU, SPECGRAV, LEUKOCYTESUR, UROBILINOGEN, BILIRUBINUR, BLOODU in the last 72 hours. Invalid input(s): NITRATE, GLUCOSEUKETONESUAMORPHOUS    Additional Lab/culture results:    Physical Exam: patient in bed not in acute distress family at bedside.     I discussed with the patient and his wife the findings of MRI of the abdomen, the questionable possible malignancy that may require a biopsy    At the present time we are waiting findings of GI workup prior to biopsy of omentum    Interval Imaging Findings:    Impression:    Patient Active Problem List   Diagnosis    Neuropathy of both feet    LBBB (left bundle branch block) S/P CABG (coronary artery bypass graft)    Unstable angina (HCC)    S/P cardiac cath    S/P ICD (internal cardiac defibrillator) procedure    Other ascites    Lower abdominal pain    Diffuse abdominal pain    Failure to thrive in adult    Anorexia    Severe malnutrition ([facility] [address]       Plan: patient and family questions were answered maintain an indwelling [facility] awaiting GI evaluation    Lucía Cordero MD  7:52 AM 12/15/2022

## 2022-12-15 NOTE — ANESTHESIA PRE PROCEDURE
Department of Anesthesiology  Preprocedure Note       Name:  Porsche Bennett   Age:  81 y.o.  :  1940                                          MRN:  [id number]         Date:  12/15/2022      Surgeon: Frances Al):  Stanislaw Cleary MD    Procedure: Procedure(s):  EGD ESOPHAGOGASTRODUODENOSCOPY    Medications prior to admission:   Prior to Admission medications    Medication Sig Start Date End Date Taking? Authorizing Provider   cefdinir (OMNICEF) 300 MG capsule Take 300 mg by mouth 2 times daily 14-day course filled 22   Yes Historical Provider, MD   dicyclomine (BENTYL) 10 MG capsule Take 10 mg by mouth 2 times daily as needed   Yes Historical Provider, MD   omeprazole (PRILOSEC) 40 MG delayed release capsule Take 40 mg by mouth daily   Yes Historical Provider, MD   metroNIDAZOLE (FLAGYL) 500 MG tablet Take 500 mg by mouth 2 times daily 24-day course filled 22   Yes Historical Provider, MD   pregabalin (LYRICA) 100 MG capsule Take 100 mg by mouth 2 times daily.     Historical Provider, MD   finasteride (PROSCAR) 5 MG tablet Take 5 mg by mouth daily    Historical Provider, MD   donepezil (ARICEPT) 10 MG tablet Take 10 mg by mouth 2 times daily    Historical Provider, MD   amitriptyline (ELAVIL) 25 MG tablet Take 25-50 mg by mouth nightly    Historical Provider, MD   rosuvastatin (CRESTOR) 10 MG tablet Take 10 mg by mouth daily    Historical Provider, MD   ondansetron (ZOFRAN) 4 MG tablet Take 4 mg by mouth every 8 hours as needed for Nausea or Vomiting    Historical Provider, MD   Melatonin 10 MG CAPS Take 10-20 mg by mouth nightly    Historical Provider, MD   famotidine (PEPCID) 40 MG tablet Take 40 mg by mouth 2 times daily    Historical Provider, MD   acetaminophen (TYLENOL) 500 MG tablet Take 500 mg by mouth every 6 hours as needed prn    Historical Provider, MD   Magnesium 400 MG TABS Take 400 mg by mouth Daily    Historical Provider, MD   vitamin D-3 (CHOLECALCIFEROL) 125 MCG (5000 UT) TABS Take 5,000 Units by mouth daily    Historical Provider, MD   Cyanocobalamin 2500 MCG TABS Take 5,000 mcg by mouth Daily    Historical Provider, MD   nitroGLYCERIN (NITROSTAT) 0.4 MG SL tablet Place 0.4 mg under the tongue every 5 minutes as needed for Chest pain up to max of 3 total doses. If no relief after 1 dose, call 911.  Using as needed but has not needed them    Historical Provider, MD   clopidogrel (PLAVIX) 75 MG tablet Take 75 mg by mouth daily    Historical Provider, MD   Diphenhydramine-APAP, sleep, (TYLENOL PM EXTRA STRENGTH PO) Take 1 tablet by mouth nightly as needed    Historical Provider, MD       Current medications:    Current Facility-Administered Medications   Medication Dose Route Frequency Provider Last Rate Last Admin    0.9 % sodium chloride bolus  50 mL IntraVENous Once Israel Matt, APRN - NP        famotidine (PEPCID) tablet 40 mg  40 mg Oral BID Elijah Loya MD   40 mg at 12/14/22 2000    rosuvastatin (CRESTOR) tablet 10 mg  10 mg Oral Daily Elijah Loya MD   10 mg at 12/14/22 0829    pantoprazole (PROTONIX) tablet 40 mg  40 mg Oral QAM AC Elijah Loya MD   40 mg at 12/14/22 0518    dextrose 5 % and 0.45 % sodium chloride infusion   IntraVENous Continuous Abdirizak Bonilla MD 75 mL/hr at 12/15/22 0654 New Bag at 12/15/22 0654    potassium chloride 10 mEq/100 mL IVPB (Peripheral Line)  10 mEq IntraVENous PRN Abdirizak Bonilla  mL/hr at 12/15/22 0932 10 mEq at 12/15/22 0932    amitriptyline (ELAVIL) tablet 25 mg  25 mg Oral Nightly Abdirizak Bonilla MD   25 mg at 12/14/22 1959    donepezil (ARICEPT) tablet 10 mg  10 mg Oral BID Abdirizak Bonilla MD   10 mg at 12/14/22 1959    finasteride (PROSCAR) tablet 5 mg  5 mg Oral Daily Abdirizak Bonilla MD   5 mg at 12/14/22 0828    magnesium oxide (MAG-OX) tablet 400 mg  400 mg Oral Daily Abdirizak Bonilla MD   400 mg at 12/14/22 0829    melatonin tablet 9 mg  9 mg Oral Nightly Cirilo Dam, MD   9 mg at 12/14/22 1959    nitroGLYCERIN (NITROSTAT) SL tablet 0.4 mg  0.4 mg SubLINGual Q5 Min PRN Abdirizak Bonilla MD        pregabalin (LYRICA) capsule 100 mg  100 mg Oral BID Abdirizak Bonilla MD   100 mg at 12/14/22 1959    sucralfate (CARAFATE) tablet 1 g  1 g Oral 4x Daily Abdirizak Bonilla MD   1 g at 12/14/22 1959    enoxaparin (LOVENOX) injection 40 mg  40 mg SubCUTAneous Daily Abdirizak Bonilla MD   40 mg at 12/14/22 1303    sodium chloride flush 0.9 % injection 5-40 mL  5-40 mL IntraVENous 2 times per day Aleyda De La Cruz MD        sodium chloride flush 0.9 % injection 5-40 mL  5-40 mL IntraVENous PRN Aleyda De La Cruz MD        sodium chloride flush 0.9 % injection 5-40 mL  5-40 mL IntraVENous 2 times per day Lisbet Marcos, APRN - CNP   10 mL at 12/13/22 0831    sodium chloride flush 0.9 % injection 10 mL  10 mL IntraVENous PRN Meet Gaw Lump, APRN - CNP        0.9 % sodium chloride infusion   IntraVENous PRN Meet Gaw Lump, APRN - CNP 10 mL/hr at 12/13/22 0636 New Bag at 12/13/22 0636    magnesium sulfate 1000 mg in dextrose 5% 100 mL IVPB  1,000 mg IntraVENous PRN Cary A Lump, APRN - CNP        ondansetron (ZOFRAN-ODT) disintegrating tablet 4 mg  4 mg Oral Q8H PRN Cary A Lump, APRN - CNP        Or    ondansetron (ZOFRAN) injection 4 mg  4 mg IntraVENous Q6H PRN Cary A Lump, APRN - CNP   4 mg at 12/14/22 1346    polyethylene glycol (GLYCOLAX) packet 17 g  17 g Oral Daily PRN Cary A Lump, APRN - CNP        acetaminophen (TYLENOL) tablet 650 mg  650 mg Oral Q6H PRN Cary A Lump, APRN - CNP        Or    acetaminophen (TYLENOL) suppository 650 mg  650 mg Rectal Q6H PRN Cary A Lump, APRN - CNP           Allergies:     Allergies   Allergen Reactions    Ciprofloxacin Other (See Comments)     Nerve pain exacerbation in feet    Codeine Other (See Comments)     insomia & tachycardia    Morphine      Pt states he has problems with his bp when given morphine    Pletal [Cilostazol]     Sulfamethoxazole-Trimethoprim     Pcn [Penicillins] Rash Problem List:    Patient Active Problem List   Diagnosis Code    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    LBBB (left bundle branch block) I44.7    S/P CABG (coronary artery bypass graft) Z95.1    Unstable angina (HCC) I20.0    S/P cardiac cath Z98.890    S/P ICD (internal cardiac defibrillator) procedure Z95.810    Other ascites R18.8    Lower abdominal pain R10.30    Diffuse abdominal pain R10.84    Failure to thrive in adult R62.7    Anorexia R63.0    Severe malnutrition ([address] E43       Past Medical History:        Diagnosis Date    Bladder stone     CAD (coronary artery disease)     MI, Stents, CABG    Diverticulitis     GERD (gastroesophageal reflux disease)     Hyperlipidemia     Hypertension     Intermittent self-catheterization of bladder     LBBB (left bundle branch block) 3/4/2017    MI (myocardial infarction) ([address]     Neuropathy     bilat toes    Polio     age 11    Wears glasses        Past Surgical History:        Procedure Laterality Date    BLADDER STONE REMOVAL      CARDIAC SURGERY  2000    CABG    COLONOSCOPY      CORONARY ANGIOPLASTY WITH STENT PLACEMENT      4 stents    CORONARY ARTERY BYPASS GRAFT  2000    triple vessel    ENDOSCOPY, COLON, DIAGNOSTIC         Social History:    Social History     Tobacco Use    Smoking status: Never    Smokeless tobacco: Never   Substance Use Topics    Alcohol use:  No                                Counseling given: Not Answered      Vital Signs (Current):   Vitals:    12/15/22 0202 12/15/22 0526 12/15/22 0623 12/15/22 0741   BP: 120/76 131/70  123/77   Pulse: 61 (!) 103  67   Resp: 18 16  18   Temp: 98.4 °F (36.9 °C) 98.4 °F (36.9 °C)  98.4 °F (36.9 °C)   TempSrc: Temporal   Oral   SpO2: 92% 92%  93%   Weight:   156 lb (70.8 kg)    Height:                                                  BP Readings from Last 3 Encounters:   12/15/22 123/77   10/11/21 118/61   02/17/20 111/65       NPO Status: BMI:   Wt Readings from Last 3 Encounters:   12/15/22 156 lb (70.8 kg)   10/11/21 155 lb (70.3 kg)   02/17/20 150 lb (68 kg)     Body mass index is 23.04 kg/m². CBC:   Lab Results   Component Value Date/Time    WBC 9.9 12/15/2022 05:14 AM    RBC 4.32 12/15/2022 05:14 AM    HGB 11.9 12/15/2022 05:14 AM    HCT 39.1 12/15/2022 05:14 AM    MCV 90.5 12/15/2022 05:14 AM    RDW 13.4 12/15/2022 05:14 AM     12/15/2022 05:14 AM       CMP:   Lab Results   Component Value Date/Time     12/15/2022 05:14 AM    K 3.4 12/15/2022 05:14 AM     12/15/2022 05:14 AM    CO2 26 12/15/2022 05:14 AM    BUN 3 12/15/2022 05:14 AM    CREATININE 0.62 12/15/2022 05:14 AM    GFRAA >60 10/12/2021 07:50 AM    LABGLOM >60 12/15/2022 05:14 AM    GLUCOSE 120 12/15/2022 05:14 AM    PROT 5.2 12/15/2022 05:14 AM    CALCIUM 8.1 12/15/2022 05:14 AM    BILITOT 0.3 12/15/2022 05:14 AM    ALKPHOS 81 12/15/2022 05:14 AM    AST 27 12/15/2022 05:14 AM    ALT 14 12/15/2022 05:14 AM       POC Tests: No results for input(s): POCGLU, POCNA, POCK, POCCL, POCBUN, POCHEMO, POCHCT in the last 72 hours.     Coags:   Lab Results   Component Value Date/Time    PROTIME 15.1 12/10/2022 05:23 AM    INR 1.2 12/10/2022 05:23 AM    APTT 30.3 12/09/2022 09:10 PM       HCG (If Applicable): No results found for: PREGTESTUR, PREGSERUM, HCG, HCGQUANT     ABGs: No results found for: PHART, PO2ART, HXZ2BXE, GYP0CAM, BEART, H1VHWKPC     Type & Screen (If Applicable):  No results found for: LABABO, LABRH    Drug/Infectious Status (If Applicable):  Lab Results   Component Value Date/Time    HEPCAB NONREACTIVE 12/10/2022 10:20 AM       COVID-19 Screening (If Applicable):   Lab Results   Component Value Date/Time    COVID19 Not Detected 12/09/2022 08:14 PM           Anesthesia Evaluation   no history of anesthetic complications:   Airway: Mallampati: II          Dental:          Pulmonary:       (-) COPD and asthma                           Cardiovascular:  Exercise tolerance: no interval change,   (+) hypertension:, pacemaker: AICD, past MI:, CAD:, CABG/stent:, dysrhythmias (LBBB):,                   Neuro/Psych:      (-) seizures and CVA            ROS comment: Neuropathy, lumbar disc dz s/p laminectomy GI/Hepatic/Renal:   (+) GERD:, liver disease (denies prior history of cirrhosis, ascites present s/p paracentesis -3L on 12/12/2022):, renal disease (urinary retention - self cath):,           Endo/Other:        (-) diabetes mellitus, hypothyroidism               Abdominal:             Vascular:     - DVT. Other Findings:           Anesthesia Plan      MAC     ASA 3       Induction: intravenous. MIPS: prophylactic pharmacologic antiemetic agents not administered perioperatively for documented reasons. Anesthetic plan and risks discussed with patient. Plan discussed with CRNA.     Attending anesthesiologist reviewed and agrees with Kathrin Sanchez MD   12/15/2022

## 2022-12-15 NOTE — PLAN OF CARE
Pt had a good night. No complaints of pain, will continue to monitor. PT is NPO for EGD this morning. Pt potasium was low at 3.4 this morning, replacement started. Call light within reach, bed in lowest position, bed locked. Will continue to monitor. Problem: Discharge Planning  Goal: Discharge to home or other facility with appropriate resources  12/15/2022 0626 by Sukhjinder Badillo RN  Outcome: Progressing     Problem: Skin/Tissue Integrity  Goal: Absence of new skin breakdown  Description: 1. Monitor for areas of redness and/or skin breakdown  2. Assess vascular access sites hourly  3. Every 4-6 hours minimum:  Change oxygen saturation probe site  4. Every 4-6 hours:  If on nasal continuous positive airway pressure, respiratory therapy assess nares and determine need for appliance change or resting period.   12/15/2022 0626 by Sukhjinder Badillo RN  Outcome: Progressing     Problem: Safety - Adult  Goal: Free from fall injury  12/15/2022 0626 by Sukhjinder Badillo RN  Outcome: Progressing     Problem: ABCDS Injury Assessment  Goal: Absence of physical injury  12/15/2022 0626 by Sukhjinder Badillo RN  Outcome: Progressing     Problem: Genitourinary - Adult  Goal: Absence of urinary retention  12/15/2022 0626 by Sukhjinder Badillo RN  Outcome: Progressing     Problem: Genitourinary - Adult  Goal: Urinary catheter remains patent  12/15/2022 0626 by Sukhjinder Badillo RN  Outcome: Progressing     Problem: Nutrition Deficit:  Goal: Optimize nutritional status  12/15/2022 0626 by Sukhjinder Badillo RN  Outcome: Progressing

## 2022-12-15 NOTE — PROGRESS NOTES
[facility]        12/15/2022   4:05 PM    Name:  Inge Canales  MRN:    [id number]     Acct:     [de-identified]   Room:  0/18-0  IP Day: 10     Admit Date: 12/9/2022  5:11 PM  PCP: Wayne Millskeeper, DO    C/C:   Chief Complaint   Patient presents with    Abdominal Pain     Loss of appetite       Assessment:      Lower abdominal pain  Unlikely spontaneous bacterial peritonitis  Questionable history of liver cirrhosis  Ascites  Failure to thrive  Diverticulosis  Coronary artery disease, native vessel  Status post CABG  Left bundle branch block  Benign prostatic hypertrophy  Mixed hyperlipidemia  Major depressive disorder  Dementia  Gastroesophageal reflux disease without esophagitis  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  Polio at age 10    Plan:      Patient is admitted to progressive unit  Oxygen to keep SPO2 more than 90%  Telemetry  Check vital signs closely  CBC BMP daily  Blood culture  Gastroenterology is involved, they are suspecting spontaneous bacterial peritonitis, patient is continued on IV antibiotics IV Rocephin, further they will follow-up fluid cultures from ascitic fluid and patient is status post paracentesis with 3 L removal fluid on 12/12/2022  Patient is status post EGD 12/15/2022, evidence of mass-effect and narrowing at GE junction and cardia multiple biopsies are taken  Urology consult  Continue Plavix  Continue Crestor  Infectious disease have evaluated patient, they recommended patient likely does not have spontaneous bacterial peritonitis, for now they have continued patient on IV Rocephin  Continue other medication as below      Scheduled Meds:   sodium chloride flush  5-40 mL IntraVENous 2 times per day    sodium chloride  50 mL IntraVENous Once    famotidine  40 mg Oral BID    rosuvastatin  10 mg Oral Daily    pantoprazole  40 mg Oral QAM AC    amitriptyline  25 mg Oral Nightly    donepezil  10 mg Oral BID    finasteride  5 mg Oral Daily    magnesium oxide 400 mg Oral Daily    melatonin  9 mg Oral Nightly    pregabalin  100 mg Oral BID    sucralfate  1 g Oral 4x Daily    enoxaparin  40 mg SubCUTAneous Daily    sodium chloride flush  5-40 mL IntraVENous 2 times per day    sodium chloride flush  5-40 mL IntraVENous 2 times per day     Continuous Infusions:   sodium chloride      dextrose 5 % and 0.45 % NaCl 75 mL/hr at 12/15/22 1240    sodium chloride 10 mL/hr at 22 0636     PRN Meds:  sodium chloride flush, 5-40 mL, PRN  sodium chloride, , PRN  potassium chloride, 10 mEq, PRN  nitroGLYCERIN, 0.4 mg, Q5 Min PRN  sodium chloride flush, 5-40 mL, PRN  sodium chloride flush, 10 mL, PRN  sodium chloride, , PRN  magnesium sulfate, 1,000 mg, PRN  ondansetron, 4 mg, Q8H PRN   Or  ondansetron, 4 mg, Q6H PRN  polyethylene glycol, 17 g, Daily PRN  acetaminophen, 650 mg, Q6H PRN   Or  acetaminophen, 650 mg, Q6H PRN          Subjective:   Patient seen and examined at bedside and reviewed  last 24 hrs events with nursing staff  No acute events overnight. Patient denies any acute complaints. Afebrile  Patient denies any chest pain, shortness of Breath, palpitation, headache, dizziness, cough, nausea, vomiting, abdominal pain, pain, changes in urination or bowel habit or rash. ROS:  A 10 point system reviewed and negative otherwise mentioned above. Physical Examination:      Vitals:  /73   Pulse 68   Temp 98.2 °F (36.8 °C) (Oral)   Resp 18   Ht 5' 9\" (1.753 m)   Wt 156 lb (70.8 kg)   SpO2 94%   BMI 23.04 kg/m²   Temp (24hrs), Av.4 °F (36.9 °C), Min:97.3 °F (36.3 °C), Max:99.5 °F (37.5 °C)    Weight:   Wt Readings from Last 3 Encounters:   12/15/22 156 lb (70.8 kg)   10/11/21 155 lb (70.3 kg)   20 150 lb (68 kg)     I/O last 3 completed shifts:  I/O last 3 completed shifts:  In: -   Out:  [Urine:]     No results for input(s): POCGLU in the last 72 hours.       General appearance - alert, well appearing, and in no acute distress  Mental status - oriented to person, place, and time with normal affect  Head - normocephalic and atraumatic  Eyes - pupils equal and reactive, extraocular eye movements intact, conjunctiva clear  Ears - hearing appears to be intact  Nose - no drainage noted  Mouth - mucous membranes moist  Neck - supple, no carotid bruits, thyroid not palpable  Chest - clear to auscultation, normal effort  Heart - normal rate, regular rhythm, no murmur  Abdomen - soft, diffuse abdominal tenderness, distended, bowel sounds present all four quadrants, no masses, hepatomegaly or splenomegaly  Neurological - normal speech, no focal findings or movement disorder noted, cranial nerves II through XII grossly intact  Extremities - peripheral pulses palpable, no pedal edema or calf pain with palpation  Skin - no gross lesions, rashes, or induration noted        Medications: Allergies:    Allergies   Allergen Reactions    Ciprofloxacin Other (See Comments)     Nerve pain exacerbation in feet    Codeine Other (See Comments)     insomia & tachycardia    Morphine      Pt states he has problems with his bp when given morphine    Pletal [Cilostazol]     Sulfamethoxazole-Trimethoprim     Pcn [Penicillins] Rash       Current Meds:   Current Facility-Administered Medications:     sodium chloride flush 0.9 % injection 5-40 mL, 5-40 mL, IntraVENous, 2 times per day, Gracia Leon MD    sodium chloride flush 0.9 % injection 5-40 mL, 5-40 mL, IntraVENous, PRN, Gracia Leon MD    0.9 % sodium chloride infusion, , IntraVENous, PRN, Gracia Leon MD    0.9 % sodium chloride bolus, 50 mL, IntraVENous, Once, Boubacar Curry MD    famotidine (PEPCID) tablet 40 mg, 40 mg, Oral, BID, Boubacar Curry MD, 40 mg at 12/15/22 1232    rosuvastatin (CRESTOR) tablet 10 mg, 10 mg, Oral, Daily, Boubacar Curry MD, 10 mg at 12/15/22 1232    pantoprazole (PROTONIX) tablet 40 mg, 40 mg, Oral, QAM AC, Boubacar Curry MD, 40 mg at 12/14/22 0518    dextrose 5 % and 0.45 % sodium chloride infusion, , IntraVENous, Continuous, Magdiel Meehan MD, Last Rate: 75 mL/hr at 12/15/22 1240, New Bag at 12/15/22 1240    potassium chloride 10 mEq/100 mL IVPB (Peripheral Line), 10 mEq, IntraVENous, PRN, Magdiel Meehan MD, Last Rate: 100 mL/hr at 12/15/22 0932, 10 mEq at 12/15/22 0932    amitriptyline (ELAVIL) tablet 25 mg, 25 mg, Oral, Nightly, Magdiel Meehan MD, 25 mg at 12/14/22 1959    donepezil (ARICEPT) tablet 10 mg, 10 mg, Oral, BID, Magdiel Meehan MD, 10 mg at 12/15/22 1233    finasteride (PROSCAR) tablet 5 mg, 5 mg, Oral, Daily, Magdiel Meehan MD, 5 mg at 12/15/22 1233    magnesium oxide (MAG-OX) tablet 400 mg, 400 mg, Oral, Daily, Magdiel Meehan MD, 400 mg at 12/15/22 1232    melatonin tablet 9 mg, 9 mg, Oral, Nightly, Magdiel Meehan MD, 9 mg at 12/14/22 1959    nitroGLYCERIN (NITROSTAT) SL tablet 0.4 mg, 0.4 mg, SubLINGual, Q5 Min PRN, Magdiel Meehan MD    pregabalin (LYRICA) capsule 100 mg, 100 mg, Oral, BID, Magdiel Meehan MD, 100 mg at 12/15/22 1233    sucralfate (CARAFATE) tablet 1 g, 1 g, Oral, 4x Daily, Magdiel Meehan MD, 1 g at 12/15/22 1232    enoxaparin (LOVENOX) injection 40 mg, 40 mg, SubCUTAneous, Daily, Magdiel Meehan MD, 40 mg at 12/14/22 7820    sodium chloride flush 0.9 % injection 5-40 mL, 5-40 mL, IntraVENous, 2 times per day, Magdiel Meehan MD, 10 mL at 12/15/22 1233    sodium chloride flush 0.9 % injection 5-40 mL, 5-40 mL, IntraVENous, PRN, Magdiel Meehan MD    sodium chloride flush 0.9 % injection 5-40 mL, 5-40 mL, IntraVENous, 2 times per day, Magdiel Meehan MD, 10 mL at 12/15/22 1233    sodium chloride flush 0.9 % injection 10 mL, 10 mL, IntraVENous, PRN, Magdiel Meehan MD    0.9 % sodium chloride infusion, , IntraVENous, PRN, Magdiel Meehan MD, Last Rate: 10 mL/hr at 12/13/22 0636, New Bag at 12/13/22 0636    magnesium sulfate 1000 mg in dextrose 5% 100 mL IVPB, 1,000 mg, IntraVENous, PRN, Magdiel Meehan MD    ondansetron (ZOFRAN-ODT) disintegrating tablet 4 mg, 4 mg, Oral, Q8H PRN **OR** ondansetron (ZOFRAN) injection 4 mg, 4 mg, IntraVENous, Q6H PRN, Charito Figueroa MD, 4 mg at 12/14/22 1346    polyethylene glycol (GLYCOLAX) packet 17 g, 17 g, Oral, Daily PRN, Charito Figueroa MD    acetaminophen (TYLENOL) tablet 650 mg, 650 mg, Oral, Q6H PRN, 650 mg at 12/15/22 1532 **OR** acetaminophen (TYLENOL) suppository 650 mg, 650 mg, Rectal, Q6H PRN, Charito Figueroa MD      I/O (24Hr):     Intake/Output Summary (Last 24 hours) at 12/15/2022 1605  Last data filed at 12/15/2022 1534  Gross per 24 hour   Intake 100 ml   Output 1725 ml   Net -1625 ml         Data:           Labs:    Hematology:  Recent Labs     12/13/22  0513 12/14/22  0527 12/15/22  0514   WBC 8.8 9.1 9.9   RBC 4.24 4.29 4.32   HGB 11.9* 12.0* 11.9*   HCT 38.0* 38.3* 39.1*   MCV 89.6 89.3 90.5   MCH 28.1 28.0 27.5   MCHC 31.3 31.3 30.4   RDW 13.5 13.4 13.4    325 333   MPV 10.6 10.3 10.7       Chemistry:  Recent Labs     12/13/22 0513 12/14/22 0527 12/14/22  1615 12/15/22  0514    137  --  135   K 3.5* 3.6*  --  3.4*    102  --  101   CO2 26 27  --  26   GLUCOSE 116* 119*  --  120*   BUN 4* 4*  --  3*   CREATININE 0.73 0.72  --  0.62*   MG 1.7  --   --  1.7   ANIONGAP 9 8*  --  8*   LABGLOM >60 >60  --  >60   CALCIUM 8.1* 8.2*  --  8.1*   PSA  --   --  0.70  --        Recent Labs     12/13/22  0513 12/14/22  0527 12/15/22  0514   PROT 5.2* 5.3* 5.2*   LABALBU 2.4* 2.5* 2.3*   AST 21 34 27   ALT 11 16 14   ALKPHOS 72 79 81   BILITOT 0.2* 0.2* 0.3         Lab Results   Component Value Date/Time    SPECIAL NOT REPORTED 02/18/2020 10:58 PM     Lab Results   Component Value Date/Time    CULTURE NO GROWTH 3 DAYS 12/12/2022 02:00 PM       No results found for: POCPH, PHART, PH, POCPCO2, ISH7PMM, PCO2, POCPO2, PO2ART, PO2, POCHCO3, SYL5OCE, HCO3, NBEA, PBEA, BEART, BE, THGBART, THB, UUZ7GIJ, TTUL5FVD, Y5OYMABQ, O2SAT, FIO2    Radiology:    CT ABDOMEN PELVIS W IV CONTRAST Additional Contrast? None    Result Date: 12/9/2022  Moderate ascites with associated moderate diffuse edema within the mesentery. Severe diverticulosis involving the left colon without evidence of acute diverticulitis. No abscess or perforation. Numerous bladder diverticula. Laminectomy at L4 and L5. Bilateral gynecomastia. FL MODIFIED BARIUM SWALLOW W VIDEO    Result Date: 12/10/2022  1. 1 instance of flash penetration without aspiration with the pureed/pudding thick substance. 2. No penetration or aspiration with the remainder of the administered substances/administrations. Please see separate speech pathology report for full discussion of findings and recommendations. All radiological studies reviewed  Code Status:  Full Code        Electronically signed by Baltazar Candelaria MD on 12/15/2022 at 4:05 PM    This note was created with the assistance of a speech-recognition program.  Although the intention is to generate a document that actually reflects the content of the visit, no guarantees can be provided that every mistake has been identified and corrected by editing. Note was updated later by me after  physical examination and  completion of the assessment.

## 2022-12-16 PROBLEM — C15.5 MALIGNANT NEOPLASM OF LOWER THIRD OF ESOPHAGUS (HCC): Status: ACTIVE | Noted: 2022-12-16

## 2022-12-16 LAB
ABSOLUTE EOS #: 0.08 K/UL (ref 0–0.44)
ABSOLUTE IMMATURE GRANULOCYTE: 0.07 K/UL (ref 0–0.3)
ABSOLUTE LYMPH #: 0.85 K/UL (ref 1.1–3.7)
ABSOLUTE MONO #: 1.2 K/UL (ref 0.1–1.2)
ALBUMIN SERPL-MCNC: 2.4 G/DL (ref 3.5–5.2)
ALP BLD-CCNC: 87 U/L (ref 40–129)
ALT SERPL-CCNC: 12 U/L (ref 5–41)
ANION GAP SERPL CALCULATED.3IONS-SCNC: 7 MMOL/L (ref 9–17)
AST SERPL-CCNC: 22 U/L
BASOPHILS # BLD: 0 % (ref 0–2)
BASOPHILS ABSOLUTE: 0.04 K/UL (ref 0–0.2)
BILIRUB SERPL-MCNC: 0.3 MG/DL (ref 0.3–1.2)
BUN BLDV-MCNC: 5 MG/DL (ref 8–23)
BUN/CREAT BLD: 7 (ref 9–20)
CALCIUM SERPL-MCNC: 8.3 MG/DL (ref 8.6–10.4)
CHLORIDE BLD-SCNC: 104 MMOL/L (ref 98–107)
CO2: 26 MMOL/L (ref 20–31)
CREAT SERPL-MCNC: 0.71 MG/DL (ref 0.7–1.2)
EOSINOPHILS RELATIVE PERCENT: 1 % (ref 1–4)
GFR SERPL CREATININE-BSD FRML MDRD: >60 ML/MIN/1.73M2
GLUCOSE BLD-MCNC: 126 MG/DL (ref 70–99)
HCT VFR BLD CALC: 39.8 % (ref 40.7–50.3)
HEMOGLOBIN: 12.3 G/DL (ref 13–17)
IMMATURE GRANULOCYTES: 1 %
LYMPHOCYTES # BLD: 8 % (ref 24–43)
MCH RBC QN AUTO: 27.4 PG (ref 25.2–33.5)
MCHC RBC AUTO-ENTMCNC: 30.9 G/DL (ref 28.4–34.8)
MCV RBC AUTO: 88.6 FL (ref 82.6–102.9)
MONOCYTES # BLD: 11 % (ref 3–12)
NRBC AUTOMATED: 0 PER 100 WBC
PATHOLOGIST: NORMAL
PDW BLD-RTO: 13.5 % (ref 11.8–14.4)
PLATELET # BLD: 364 K/UL (ref 138–453)
PMV BLD AUTO: 10.7 FL (ref 8.1–13.5)
POTASSIUM SERPL-SCNC: 3.9 MMOL/L (ref 3.7–5.3)
RBC # BLD: 4.49 M/UL (ref 4.21–5.77)
SEG NEUTROPHILS: 79 % (ref 36–65)
SEGMENTED NEUTROPHILS ABSOLUTE COUNT: 8.55 K/UL (ref 1.5–8.1)
SERUM IFX INTERP: NORMAL
SODIUM BLD-SCNC: 137 MMOL/L (ref 135–144)
SURGICAL PATHOLOGY REPORT: NORMAL
TOTAL PROTEIN: 5.4 G/DL (ref 6.4–8.3)
WBC # BLD: 10.8 K/UL (ref 3.5–11.3)

## 2022-12-16 PROCEDURE — 99233 SBSQ HOSP IP/OBS HIGH 50: CPT | Performed by: INTERNAL MEDICINE

## 2022-12-16 PROCEDURE — 6370000000 HC RX 637 (ALT 250 FOR IP): Performed by: INTERNAL MEDICINE

## 2022-12-16 PROCEDURE — 2060000000 HC ICU INTERMEDIATE R&B

## 2022-12-16 PROCEDURE — 85025 COMPLETE CBC W/AUTO DIFF WBC: CPT

## 2022-12-16 PROCEDURE — 6360000002 HC RX W HCPCS: Performed by: INTERNAL MEDICINE

## 2022-12-16 PROCEDURE — APPSS30 APP SPLIT SHARED TIME 16-30 MINUTES: Performed by: NURSE PRACTITIONER

## 2022-12-16 PROCEDURE — 80053 COMPREHEN METABOLIC PANEL: CPT

## 2022-12-16 PROCEDURE — 36415 COLL VENOUS BLD VENIPUNCTURE: CPT

## 2022-12-16 PROCEDURE — 2580000003 HC RX 258: Performed by: STUDENT IN AN ORGANIZED HEALTH CARE EDUCATION/TRAINING PROGRAM

## 2022-12-16 PROCEDURE — 2580000003 HC RX 258: Performed by: INTERNAL MEDICINE

## 2022-12-16 PROCEDURE — 97112 NEUROMUSCULAR REEDUCATION: CPT

## 2022-12-16 PROCEDURE — 97530 THERAPEUTIC ACTIVITIES: CPT

## 2022-12-16 PROCEDURE — 99232 SBSQ HOSP IP/OBS MODERATE 35: CPT | Performed by: INTERNAL MEDICINE

## 2022-12-16 RX ADMIN — FINASTERIDE 5 MG: 5 TABLET, FILM COATED ORAL at 10:23

## 2022-12-16 RX ADMIN — PANTOPRAZOLE SODIUM 40 MG: 40 TABLET, DELAYED RELEASE ORAL at 05:29

## 2022-12-16 RX ADMIN — ONDANSETRON 4 MG: 2 INJECTION INTRAMUSCULAR; INTRAVENOUS at 08:24

## 2022-12-16 RX ADMIN — ACETAMINOPHEN 650 MG: 325 TABLET ORAL at 12:33

## 2022-12-16 RX ADMIN — FAMOTIDINE 40 MG: 20 TABLET, FILM COATED ORAL at 21:39

## 2022-12-16 RX ADMIN — Medication 9 MG: at 21:38

## 2022-12-16 RX ADMIN — PREGABALIN 100 MG: 100 CAPSULE ORAL at 21:39

## 2022-12-16 RX ADMIN — AMITRIPTYLINE HYDROCHLORIDE 25 MG: 25 TABLET, FILM COATED ORAL at 21:39

## 2022-12-16 RX ADMIN — SODIUM CHLORIDE, PRESERVATIVE FREE 10 ML: 5 INJECTION INTRAVENOUS at 22:21

## 2022-12-16 RX ADMIN — PREGABALIN 100 MG: 100 CAPSULE ORAL at 10:23

## 2022-12-16 RX ADMIN — FAMOTIDINE 40 MG: 20 TABLET, FILM COATED ORAL at 10:23

## 2022-12-16 RX ADMIN — SUCRALFATE 1 G: 1 TABLET ORAL at 21:39

## 2022-12-16 RX ADMIN — SUCRALFATE 1 G: 1 TABLET ORAL at 12:33

## 2022-12-16 RX ADMIN — DEXTROSE AND SODIUM CHLORIDE: 5; 450 INJECTION, SOLUTION INTRAVENOUS at 10:33

## 2022-12-16 RX ADMIN — ROSUVASTATIN CALCIUM 10 MG: 10 TABLET, FILM COATED ORAL at 10:23

## 2022-12-16 RX ADMIN — DEXTROSE AND SODIUM CHLORIDE: 5; 450 INJECTION, SOLUTION INTRAVENOUS at 23:16

## 2022-12-16 RX ADMIN — SUCRALFATE 1 G: 1 TABLET ORAL at 17:50

## 2022-12-16 RX ADMIN — DONEPEZIL HYDROCHLORIDE 10 MG: 10 TABLET, FILM COATED ORAL at 21:39

## 2022-12-16 RX ADMIN — ACETAMINOPHEN 650 MG: 325 TABLET ORAL at 05:29

## 2022-12-16 RX ADMIN — Medication 400 MG: at 10:29

## 2022-12-16 RX ADMIN — SUCRALFATE 1 G: 1 TABLET ORAL at 10:23

## 2022-12-16 RX ADMIN — ENOXAPARIN SODIUM 40 MG: 100 INJECTION SUBCUTANEOUS at 10:22

## 2022-12-16 RX ADMIN — DONEPEZIL HYDROCHLORIDE 10 MG: 10 TABLET, FILM COATED ORAL at 10:23

## 2022-12-16 ASSESSMENT — ENCOUNTER SYMPTOMS
ABDOMINAL PAIN: 1
DIARRHEA: 0
RESPIRATORY NEGATIVE: 1
VOMITING: 1
NAUSEA: 1
SHORTNESS OF BREATH: 0
COUGH: 0

## 2022-12-16 ASSESSMENT — PAIN SCALES - GENERAL: PAINLEVEL_OUTOF10: 10

## 2022-12-16 ASSESSMENT — PAIN DESCRIPTION - LOCATION: LOCATION: ABDOMEN

## 2022-12-16 ASSESSMENT — PAIN DESCRIPTION - ORIENTATION: ORIENTATION: LEFT

## 2022-12-16 NOTE — PROGRESS NOTES
GI Progress notes    12/16/2022   10:36 AM    Name:  Irene Arenas  MRN:    [id number]     Acct:     [de-identified]   Room:  [address] [place] [address] Day: 7     Admit Date: 12/9/2022  5:11 PM  PCP: Mannie Larios DO    Subjective:     C/C:   Chief Complaint   Patient presents with    Abdominal Pain     Loss of appetite       Interval History: Status: not changed. Patient seen and examined  No acute events overnight  Up with PT/OT  Reports abdominal pain has improved somewhat  Tolerating minimal PO intake  Reports intermittent nausea    S/p EGD yesterday revealing evidence of mass effect, narrowing at GE junction as well as mass effect at the cardia. Multiple biopsies obtained. ROS:  Constitutional: negative for chills, fevers and sweats  Gastrointestinal: negative for constipation, diarrhea, and vomiting  Neurological: negative for dizziness and headaches    Medications: Allergies:    Allergies   Allergen Reactions    Ciprofloxacin Other (See Comments)     Nerve pain exacerbation in feet    Codeine Other (See Comments)     insomia & tachycardia    Morphine      Pt states he has problems with his bp when given morphine    Pletal [Cilostazol]     Sulfamethoxazole-Trimethoprim     Pcn [Penicillins] Rash       Current Meds: sodium chloride flush 0.9 % injection 5-40 mL, 2 times per day  sodium chloride flush 0.9 % injection 5-40 mL, PRN  0.9 % sodium chloride infusion, PRN  0.9 % sodium chloride bolus, Once  famotidine (PEPCID) tablet 40 mg, BID  rosuvastatin (CRESTOR) tablet 10 mg, Daily  pantoprazole (PROTONIX) tablet 40 mg, QAM AC  dextrose 5 % and 0.45 % sodium chloride infusion, Continuous  potassium chloride 10 mEq/100 mL IVPB (Peripheral Line), PRN  amitriptyline (ELAVIL) tablet 25 mg, Nightly  donepezil (ARICEPT) tablet 10 mg, BID  finasteride (PROSCAR) tablet 5 mg, Daily  magnesium oxide (MAG-OX) tablet 400 mg, Daily  melatonin tablet 9 mg, Nightly  nitroGLYCERIN (NITROSTAT) SL tablet 0.4 mg, Q5 Min PRN  pregabalin (LYRICA) capsule 100 mg, BID  sucralfate (CARAFATE) tablet 1 g, 4x Daily  enoxaparin (LOVENOX) injection 40 mg, Daily  magnesium sulfate 1000 mg in dextrose 5% 100 mL IVPB, PRN  ondansetron (ZOFRAN-ODT) disintegrating tablet 4 mg, Q8H PRN   Or  ondansetron (ZOFRAN) injection 4 mg, Q6H PRN  polyethylene glycol (GLYCOLAX) packet 17 g, Daily PRN  acetaminophen (TYLENOL) tablet 650 mg, Q6H PRN   Or  acetaminophen (TYLENOL) suppository 650 mg, Q6H PRN        Data:     Code Status:  Full Code    No family history on file. Social History     Socioeconomic History    Marital status:      Spouse name: Not on file    Number of children: Not on file    Years of education: Not on file    Highest education level: Not on file   Occupational History    Not on file   Tobacco Use    Smoking status: Never    Smokeless tobacco: Never   Vaping Use    Vaping Use: Never used   Substance and Sexual Activity    Alcohol use: No    Drug use: No    Sexual activity: Not on file   Other Topics Concern    Not on file   Social History Narrative    Not on file     Social Determinants of Health     Financial Resource Strain: Not on file   Food Insecurity: Not on file   Transportation Needs: Not on file   Physical Activity: Not on file   Stress: Not on file   Social Connections: Not on file   Intimate Partner Violence: Not on file   Housing Stability: Not on file       Vitals:  /68   Pulse 62   Temp 97.5 °F (36.4 °C)   Resp 14   Ht 5' 9\" (1.753 m)   Wt 159 lb (72.1 kg)   SpO2 93%   BMI 23.48 kg/m²   Temp (24hrs), Av °F (36.7 °C), Min:97.3 °F (36.3 °C), Max:98.6 °F (37 °C)    No results for input(s): POCGLU in the last 72 hours. I/O (24Hr):     Intake/Output Summary (Last 24 hours) at 2022 1036  Last data filed at 2022 0549  Gross per 24 hour   Intake 100 ml   Output 1775 ml   Net -1675 ml       Labs:      CBC:   Lab Results   Component Value Date/Time    WBC 10.8 2022 05:19 AM    RBC GFRAA >60 10/12/2021 07:50 AM    LABGLOM >60 12/16/2022 05:19 AM    GLUCOSE 126 12/16/2022 05:19 AM     PT/INR:    Lab Results   Component Value Date/Time    PROTIME 15.1 12/10/2022 05:23 AM    INR 1.2 12/10/2022 05:23 AM     PTT:    Lab Results   Component Value Date/Time    APTT 30.3 12/09/2022 09:10 PM   [APTT}    Physical Examination:        General appearance: alert, cooperative and no distress  Mental Status: oriented to person, place and time and normal affect  Abdomen: soft, nontender, nondistended, bowel sounds present   Extremities: no edema, redness or tenderness in the calves  Skin: no gross lesions, rashes, or induration    Assessment:        Primary Problem  Other ascites     Active Hospital Problems    Diagnosis Date Noted    Severe malnutrition ([facility] [address] [E43] 12/12/2022     Priority: Medium    Lower abdominal pain [R10.30] 12/10/2022     Priority: Medium    Diffuse abdominal pain [R10.84] 12/10/2022     Priority: Medium    Failure to thrive in adult [R62.7] 12/10/2022     Priority: Medium    Anorexia [R63.0] 12/10/2022     Priority: Medium    Other ascites [R18.8] 12/09/2022     Priority: Medium     Past Medical History:   Diagnosis Date    Bladder stone     CAD (coronary artery disease)     MI, Stents, CABG    Diverticulitis     GERD (gastroesophageal reflux disease)     Hyperlipidemia     Hypertension     Intermittent self-catheterization of bladder     LBBB (left bundle branch block) 3/4/2017    MI (myocardial infarction) (HCC)     Neuropathy     bilat toes    Polio     age 10    Wears glasses         Plan:        Abdominal pain, ?  Hx of cirrhosis, ascites on imaging  S/p paracentesis with 3 L removed  Ascitic fluid analysis appears to be exudate, SAAG < 1.1, PMN < 250 (was on abx for approximately 24 hours)  AFB negative  On Rocephin per ID  Liver disease workup in progress  MRI Abdomen noted  2 gm sodium diet  Dysphagia, malnutrition  S/p EGD revealing mass at GE junction, bx obtained  Oncology following  Will need PEG tube placement, discussed with patient and wife and they will discuss. Time spent reviewing the chart, seeing the patient, and discussing with the attending MD around 30 minutes. Time spent reviewing the chart, seeing the patient, and discussing with the attending MD around 30 minutes.     Explained to the patient and d/W Nursing Staff  Will F/U with you  Please call or Page for any issues or change in status  Thanks    Electronically signed by MAJO Taylor NP on 12/16/2022 at 10:36 AM

## 2022-12-16 NOTE — PROGRESS NOTES
[address] Hospitalist        12/16/2022   3:05 PM    Name:  Eleanor Cohn  MRN:    [id number]     Acct:     [de-identified]   Room:  0/18-0  IP Day: 7     Admit Date: 12/9/2022  5:11 PM  PCP: Gabbi Bentley,     C/C:   Chief Complaint   Patient presents with    Abdominal Pain     Loss of appetite       Assessment:      Lower abdominal pain  Unlikely spontaneous bacterial peritonitis  Questionable history of liver cirrhosis  Ascites  Failure to thrive  Diverticulosis  Coronary artery disease, native vessel  Status post CABG  Left bundle branch block  Benign prostatic hypertrophy  Mixed hyperlipidemia  Major depressive disorder  Dementia  Gastroesophageal reflux disease without esophagitis  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  Polio at age 10    Plan:      Patient is admitted to progressive unit  Oxygen to keep SPO2 more than 90%  Telemetry  Check vital signs closely  CBC BMP daily  Blood culture  Gastroenterology is involved, they are suspecting spontaneous bacterial peritonitis, patient is continued on IV antibiotics IV Rocephin, further they will follow-up fluid cultures from ascitic fluid and patient is status post paracentesis with 3 L removal fluid on 12/12/2022  Patient is status post EGD 12/15/2022, evidence of mass-effect and narrowing at GE junction and cardia multiple biopsies are taken  Urology consult  Continue Plavix  Continue Crestor  Infectious disease have evaluated patient, they recommended patient likely does not have spontaneous bacterial peritonitis, for now they have patient completed IV Rocephin  Await biopsy results  Continue other medication as below      Scheduled Meds:   sodium chloride flush  5-40 mL IntraVENous 2 times per day    sodium chloride  50 mL IntraVENous Once    famotidine  40 mg Oral BID    rosuvastatin  10 mg Oral Daily    pantoprazole  40 mg Oral QAM AC    amitriptyline  25 mg Oral Nightly    donepezil  10 mg Oral BID    finasteride  5 mg Oral Daily magnesium oxide  400 mg Oral Daily    melatonin  9 mg Oral Nightly    pregabalin  100 mg Oral BID    sucralfate  1 g Oral 4x Daily    enoxaparin  40 mg SubCUTAneous Daily     Continuous Infusions:   sodium chloride      dextrose 5 % and 0.45 % NaCl 75 mL/hr at 22 1033     PRN Meds:  sodium chloride flush, 5-40 mL, PRN  sodium chloride, , PRN  potassium chloride, 10 mEq, PRN  nitroGLYCERIN, 0.4 mg, Q5 Min PRN  magnesium sulfate, 1,000 mg, PRN  ondansetron, 4 mg, Q8H PRN   Or  ondansetron, 4 mg, Q6H PRN  polyethylene glycol, 17 g, Daily PRN  acetaminophen, 650 mg, Q6H PRN   Or  acetaminophen, 650 mg, Q6H PRN          Subjective:     I have seen and examined patient today, patient last 24 hours events were reviewed also discussed with nursing staff  No new complaints  Overnight patient did not had any acute issues  No nausea vomiting diarrhea  Afebrile  Pt. Denies any CP, SOB, palpitation, HA, dizziness, chills, cough, cold, changes in urination, BM or skin changes . ROS:  A 10 point system reviewed and negative otherwise mentioned above. Physical Examination:      Vitals:  /64   Pulse 55   Temp 97.5 °F (36.4 °C)   Resp 12   Ht 5' 9\" (1.753 m)   Wt 159 lb (72.1 kg)   SpO2 94%   BMI 23.48 kg/m²   Temp (24hrs), Av.1 °F (36.7 °C), Min:97.5 °F (36.4 °C), Max:98.6 °F (37 °C)    Weight:   Wt Readings from Last 3 Encounters:   22 159 lb (72.1 kg)   10/11/21 155 lb (70.3 kg)   20 150 lb (68 kg)     I/O last 3 completed shifts:  I/O last 3 completed shifts: In: 100 [I.V.:100]  Out: 7824 [Urine:2575]     No results for input(s): POCGLU in the last 72 hours.       General appearance - alert, well appearing, and in no acute distress  Mental status - oriented to person, place, and time with normal affect  Head - normocephalic and atraumatic  Eyes - pupils equal and reactive, extraocular eye movements intact, conjunctiva clear  Ears - hearing appears to be intact  Nose - no drainage noted  Mouth - mucous membranes moist  Neck - supple, no carotid bruits, thyroid not palpable  Chest - clear to auscultation, normal effort  Heart - normal rate, regular rhythm, no murmur  Abdomen - soft, diffuse abdominal tenderness, distended, bowel sounds present all four quadrants, no masses, hepatomegaly or splenomegaly  Neurological - normal speech, no focal findings or movement disorder noted, cranial nerves II through XII grossly intact  Extremities - peripheral pulses palpable, no pedal edema or calf pain with palpation  Skin - no gross lesions, rashes, or induration noted        Medications: Allergies:    Allergies   Allergen Reactions    Ciprofloxacin Other (See Comments)     Nerve pain exacerbation in feet    Codeine Other (See Comments)     insomia & tachycardia    Morphine      Pt states he has problems with his bp when given morphine    Pletal [Cilostazol]     Sulfamethoxazole-Trimethoprim     Pcn [Penicillins] Rash       Current Meds:   Current Facility-Administered Medications:     sodium chloride flush 0.9 % injection 5-40 mL, 5-40 mL, IntraVENous, 2 times per day, Dorothy Stark MD    sodium chloride flush 0.9 % injection 5-40 mL, 5-40 mL, IntraVENous, PRN, Dorothy Stark MD    0.9 % sodium chloride infusion, , IntraVENous, PRN, Dorothy Stark MD    0.9 % sodium chloride bolus, 50 mL, IntraVENous, Once, Kathleen Conti MD    famotidine (PEPCID) tablet 40 mg, 40 mg, Oral, BID, Kathleen Conti MD, 40 mg at 12/16/22 1023    rosuvastatin (CRESTOR) tablet 10 mg, 10 mg, Oral, Daily, Kathleen Conti MD, 10 mg at 12/16/22 1023    pantoprazole (PROTONIX) tablet 40 mg, 40 mg, Oral, QAM AC, Kathleen Conti MD, 40 mg at 12/16/22 0529    dextrose 5 % and 0.45 % sodium chloride infusion, , IntraVENous, Continuous, Kathleen Conti MD, Last Rate: 75 mL/hr at 12/16/22 1033, New Bag at 12/16/22 1033    potassium chloride 10 mEq/100 mL IVPB (Peripheral Line), 10 mEq, IntraVENous, PRN, Kathleen Conti MD, Last Rate: 100 mL/hr at 12/15/22 0932, 10 mEq at 12/15/22 0932    amitriptyline (ELAVIL) tablet 25 mg, 25 mg, Oral, Nightly, Marla Moreira MD, 25 mg at 12/15/22 1936    donepezil (ARICEPT) tablet 10 mg, 10 mg, Oral, BID, Marla Moreira MD, 10 mg at 12/16/22 1023    finasteride (PROSCAR) tablet 5 mg, 5 mg, Oral, Daily, Marla Moreira MD, 5 mg at 12/16/22 1023    magnesium oxide (MAG-OX) tablet 400 mg, 400 mg, Oral, Daily, Marla Moreira MD, 400 mg at 12/16/22 1029    melatonin tablet 9 mg, 9 mg, Oral, Nightly, Marla Moreira MD, 9 mg at 12/15/22 1935    nitroGLYCERIN (NITROSTAT) SL tablet 0.4 mg, 0.4 mg, SubLINGual, Q5 Min PRN, Marla Moreira MD    pregabalin (LYRICA) capsule 100 mg, 100 mg, Oral, BID, Marla Moreira MD, 100 mg at 12/16/22 1023    sucralfate (CARAFATE) tablet 1 g, 1 g, Oral, 4x Daily, Marla Moreira MD, 1 g at 12/16/22 1233    enoxaparin (LOVENOX) injection 40 mg, 40 mg, SubCUTAneous, Daily, Marla Moreira MD, 40 mg at 12/16/22 1022    magnesium sulfate 1000 mg in dextrose 5% 100 mL IVPB, 1,000 mg, IntraVENous, PRN, Marla Moreira MD    ondansetron (ZOFRAN-ODT) disintegrating tablet 4 mg, 4 mg, Oral, Q8H PRN **OR** ondansetron (ZOFRAN) injection 4 mg, 4 mg, IntraVENous, Q6H PRN, Marla Moreira MD, 4 mg at 12/16/22 0824    polyethylene glycol (GLYCOLAX) packet 17 g, 17 g, Oral, Daily PRN, Marla Moreira MD    acetaminophen (TYLENOL) tablet 650 mg, 650 mg, Oral, Q6H PRN, 650 mg at 12/16/22 1233 **OR** acetaminophen (TYLENOL) suppository 650 mg, 650 mg, Rectal, Q6H PRN, Marla Moreira MD      I/O (24Hr):     Intake/Output Summary (Last 24 hours) at 12/16/2022 1505  Last data filed at 12/16/2022 0549  Gross per 24 hour   Intake --   Output 1775 ml   Net -1775 ml         Data:           Labs:    Hematology:  Recent Labs     12/14/22  0527 12/15/22  0514 12/16/22  0519   WBC 9.1 9.9 10.8   RBC 4.29 4.32 4.49   HGB 12.0* 11.9* 12.3*   HCT 38.3* 39.1* 39.8*   MCV 89.3 90.5 88.6   MCH 28.0 27.5 27.4   MCHC 31.3 30.4 30.9 RDW 13.4 13.4 13.5    333 364   MPV 10.3 10.7 10.7       Chemistry:  Recent Labs     12/14/22  0527 12/14/22  1615 12/15/22  0514 12/16/22  0519     --  135 137   K 3.6*  --  3.4* 3.9     --  101 104   CO2 27  --  26 26   GLUCOSE 119*  --  120* 126*   BUN 4*  --  3* 5*   CREATININE 0.72  --  0.62* 0.71   MG  --   --  1.7  --    ANIONGAP 8*  --  8* 7*   LABGLOM >60  --  >60 >60   CALCIUM 8.2*  --  8.1* 8.3*   PSA  --  0.70  --   --        Recent Labs     12/14/22  0527 12/15/22  0514 12/16/22 0519   PROT 5.3* 5.2* 5.4*   LABALBU 2.5* 2.3* 2.4*   AST 34 27 22   ALT 16 14 12   ALKPHOS 79 81 87   BILITOT 0.2* 0.3 0.3         Lab Results   Component Value Date/Time    SPECIAL NOT REPORTED 02/18/2020 10:58 PM     Lab Results   Component Value Date/Time    CULTURE NO GROWTH 4 DAYS 12/12/2022 02:00 PM       No results found for: POCPH, PHART, PH, POCPCO2, QWP9JMS, PCO2, POCPO2, PO2ART, PO2, POCHCO3, YTM4VSL, HCO3, NBEA, PBEA, BEART, BE, THGBART, THB, GTD6LYD, QSPG8PPQ, P0UVIDJD, O2SAT, FIO2    Radiology:    CT ABDOMEN PELVIS W IV CONTRAST Additional Contrast? None    Result Date: 12/9/2022  Moderate ascites with associated moderate diffuse edema within the mesentery. Severe diverticulosis involving the left colon without evidence of acute diverticulitis. No abscess or perforation. Numerous bladder diverticula. Laminectomy at L4 and L5. Bilateral gynecomastia. FL MODIFIED BARIUM SWALLOW W VIDEO    Result Date: 12/10/2022  1. 1 instance of flash penetration without aspiration with the pureed/pudding thick substance. 2. No penetration or aspiration with the remainder of the administered substances/administrations. Please see separate speech pathology report for full discussion of findings and recommendations.          All radiological studies reviewed  Code Status:  Full Code        Electronically signed by Uma Gomez MD on 12/16/2022 at 3:05 PM    This note was created with the assistance of a speech-recognition program.  Although the intention is to generate a document that actually reflects the content of the visit, no guarantees can be provided that every mistake has been identified and corrected by editing. Note was updated later by me after  physical examination and  completion of the assessment.

## 2022-12-16 NOTE — PROGRESS NOTES
Comprehensive Nutrition Assessment    Type and Reason for Visit:  Reassess    Nutrition Recommendations/Plan:   Continue Clear Liquid diet  Add Ensure Clear to each meal tray. Monitor PO intakes, check PEG placement and/or possible TPN status. Malnutrition Assessment:  Malnutrition Status:  Severe malnutrition (12/12/22 1854)    Context:  Chronic Illness     Findings of the 6 clinical characteristics of malnutrition:  Energy Intake:  75% or less estimated energy requirements for 1 month or longer  Weight Loss:  Unable to assess     Body Fat Loss:  Severe body fat loss Triceps, Orbital   Muscle Mass Loss:  Severe muscle mass loss Temples (temporalis), Clavicles (pectoralis & deltoids)  Fluid Accumulation:  Severe Ascites   Strength:  Not Performed    Nutrition Assessment:    Currently on Clear Liquid Diet with minimal oral intakes. Has intermittent nausea, history of cirrhosis, had paracentesis 12/12/22 with 3 liter fluid removed. Patient has satiety and fullness after few bites of food or liquid intakes. Gastroenterology is involved, they are suspecting spontaneous bacterial peritonitis, patient is continued on IV antibiotics IV Rocephin, further they will follow-up fluid cultures from ascitic fluid. Patient is status post EGD 12/15/2022, evidence of mass-effect and narrowing at GE junction and cardia multiple biopsies are taken. Awaiting biopsy results. Possibility of PEG placement was discuseed with patient and family by Physician. Wife and son said they would like to proceed with PEG placement if needed after biopsy results are back. Son inquired about TPN and requested TPN if PEG not place in next day or so. Charge nurse Cami notified of family request. Will add Ensure Clear with each meal. Wife said patient liked that better and may drink it better. Nutrition Related Findings:    No edema. Bowel sounds hypoactive all quadrants. Distention.  MBSS (12/10) Wound Type: None       Current Nutrition Intake & Therapies:    Average Meal Intake: 1-25%  Average Supplements Intake: None Ordered  ADULT DIET; Clear Liquid  ADULT ORAL NUTRITION SUPPLEMENT; Breakfast, Lunch, Dinner; Clear Liquid Oral Supplement    Anthropometric Measures:  Height: 5' 9\" (175.3 cm)  Ideal Body Weight (IBW): 160 lbs (73 kg)       Current Body Weight: 159 lb (72.1 kg) (weight variation likely related to shift in fluids), 90.6 % IBW.     Current BMI (kg/m2): 23.5                          BMI Categories: Normal Weight (BMI 22.0 to 24.9) age over 72    Estimated Daily Nutrient Needs:  Energy Requirements Based On: Kcal/kg  Weight Used for Energy Requirements: Current  Energy (kcal/day): 1975 kcal (30 mL/hr)  Weight Used for Protein Requirements: Current  Protein (g/day): 86-99 gm of protein (1.3-1.5 gm/kg)       Nutrition Diagnosis:   Severe malnutrition related to inadequate protein-energy intake as evidenced by intake 26-50%, intake 0-25%, weight loss, severe muscle loss, severe loss of subcutaneous fat    Nutrition Interventions:   Food and/or Nutrient Delivery: Continue Current Diet, Start Oral Nutrition Supplement  Nutrition Education/Counseling: Education not indicated  Coordination of Nutrition Care: Continue to monitor while inpatient       Goals:     Goals: PO intake 50% or greater       Nutrition Monitoring and Evaluation:   Behavioral-Environmental Outcomes: None Identified  Food/Nutrient Intake Outcomes: Diet Advancement/Tolerance, Food and Nutrient Intake, Supplement Intake  Physical Signs/Symptoms Outcomes: Biochemical Data, Fluid Status or Edema, Weight, Skin, GI Status    Discharge Planning:    Continue current diet, Continue Oral Nutrition Supplement       Some areas of assessment may be incomplete due to COVID-19 precautions    Emeterio Santillan RD, LD  Office phone [phone]

## 2022-12-16 NOTE — PROGRESS NOTES
Occupational Therapy  Facility/Department: Psychiatric PROGRESSIVE CARE  Rehabilitation Occupational Therapy Daily Treatment Note    Date: 22  Patient Name: Inge Canales       Room: 7543/7770-01  MRN: [id number]  Account: [de-identified]   : 1940  (81 y.o.) Gender: male           Pt currently functioning below baseline. Recommend daily inpatient skilled therapy at time of discharge to maximize long term outcomes and prevent re-admission. Please refer to AM-PAC score for current level of function. Past Medical History:  has a past medical history of Bladder stone, CAD (coronary artery disease), Diverticulitis, GERD (gastroesophageal reflux disease), Hyperlipidemia, Hypertension, Intermittent self-catheterization of bladder, LBBB (left bundle branch block), MI (myocardial infarction) ([facility] [address] Neuropathy, Polio, and Wears glasses. Past Surgical History:   has a past surgical history that includes Coronary artery bypass graft (); Coronary angioplasty with stent; Cardiac surgery (); Colonoscopy; Endoscopy, colon, diagnostic; Bladder stone removal; Esophagogastroduodenoscopy (12/15/2022); and Upper gastrointestinal endoscopy (N/A, 12/15/2022). Restrictions  Restrictions/Precautions: Fall Risk;Bed Alarm;General Precautions; Up as Tolerated  Other position/activity restrictions: IV, telemetry  Required Braces or Orthoses?: No    Subjective  Subjective: Pt in bed upon arrival and agreeable to therapy, wife present. Restrictions/Precautions: Fall Risk;Bed Alarm;General Precautions; Up as Tolerated             Objective     Cognition  Overall Cognitive Status: Exceptions  Arousal/Alertness: Appropriate responses to stimuli;Delayed responses to stimuli  Following Commands: Follows one step commands with repetition; Follows one step commands with increased time; Inconsistently follows commands  Attention Span: Appears intact; Attends with cues to redirect; Difficulty attending to directions  Memory: Decreased short term memory;Decreased recall of recent events  Safety Judgement: Decreased awareness of need for assistance;Decreased awareness of need for safety  Problem Solving: Assistance required to generate solutions;Assistance required to implement solutions;Assistance required to identify errors made;Assistance required to correct errors made;Decreased awareness of errors  Insights: Decreased awareness of deficits  Initiation: Requires cues for all  Sequencing: Requires cues for all  Orientation  Overall Orientation Status: Within Functional Limits   Perception  Overall Perceptual Status: Impaired  Initiation: Cues to initiate tasks                Functional Mobility  Skilled Clinical Factors: [facility]  Bed Mobility  Overall Assistance Level: Maximum Assistance; Requires x 2 Assistance  Additional Factors: Verbal cues; Increased time to complete; Head of bed raised; With handrails  Supine to Sit  Assistance Level: Maximum assistance; Requires x 2 assistance  Skilled Clinical Factors: Max verbal and hand over hand cues to initiate/sequence movements, use of bed rail, to stay on task, bring UB upright and to maintain static sitting balance. Pt req'd MAX A to maintain sitting balance on EOB. Scooting  Assistance Level: Maximum assistance; Requires x 2 assistance  Skilled Clinical Factors: to scoot self to EOB  Transfers  Surface: From bed; To chair with arms  Additional Factors: Verbal cues; Hand placement cues  Device: Lift equipment (adrianna stedy)  Sit to Stand  Assistance Level: Moderate assistance; Requires x 2 assistance  Stand to Sit  Assistance Level: Moderate assistance; Requires x 2 assistance  Skilled Clinical Factors: Max verbal/tactile cues for hand/foot placement, nose over toes, upright posture, controlled stand to sit and overall safety. Pt did stand up from chair with RW x2 attempts with MOD A x2 and tolerated standing ~30 seconds.    Neuromuscular Education  Neuromuscular education: Yes  NDT Treatment: Sitting;Standing     Assessment  Assessment  Assessment: Pt tolerated session fair and remains limited by fatigue, pain, global weakness, decreased activity tolerance, cognitive deficits and B LE weakness. Pt req's 2 staff assist for bed mobility, transfers and mobility with use of adrianna stedy. Pt is a high fall risk. Continued skilled OT services are indicated to maximize this pt's safety and IND with self care tasks and to facilitate safe return to PLOF as able. Activity Tolerance: Patient limited by fatigue;Patient limited by endurance;Treatment limited secondary to decreased cognition;Patient limited by pain  Discharge Recommendations: Patient would benefit from continued therapy after discharge  Safety Devices  Safety Devices in place: Yes  Type of devices: All fall risk precautions in place; Left in chair;Nurse notified;Call light within reach; Chair alarm in place;Gait belt;Patient at risk for falls    Patient Education  Education  Education Given To: Patient  Education Provided: Transfer Training;Energy Conservation;Cognition; Safety;Equipment  Education Method: Demonstration;Verbal;Teach Back  Barriers to Learning: Cognition  Education Outcome: Continued education needed    Plan  Occupational Therapy Plan  Times Per Week: 4-5X per week, 1x/day as aylin  Current Treatment Recommendations: Strengthening;Balance training;Functional mobility training; Endurance training;Cognitive reorientation; Safety education & training;Self-Care / ADL;Neuromuscular re-education;Patient/Caregiver education & training    Goals  Patient Goals   Patient goals : To get stronger  Short Term Goals  Time Frame for Short Term Goals:  For LOS in hospital, pt will  Short Term Goal 1: complete grooming task with setup  Short Term Goal 2: complete UB bathe and dress with min assist  Short Term Goal 3: complete LB bathe and dress with mod assist  Short Term Goal 4: complete toileting task with min assist  Short Term Goal 5: complete functional mobility for ADL with CG    AM-PAC Score        AM-PAC Inpatient Daily Activity Raw Score: 12 (12/16/22 1232)  AM-PAC Inpatient ADL T-Scale Score : 30.6 (12/16/22 1232)  ADL Inpatient CMS 0-100% Score: 66.57 (12/16/22 1232)  ADL Inpatient CMS G-Code Modifier : CL (12/16/22 1232)      Therapy Time   Individual Concurrent Group Co-treatment   Time In       1007   Time Out       1   Minutes       18    Co-treatment with PT warranted secondary to decreased safety and independence requiring 2 skilled therapy professionals to address individual discipline's goals. OT addressing preparation for ADL transfer, sitting balance for increased ADL performance, sitting/activity tolerance, functional reaching, environmental safety/scanning, fall prevention, functional mobility for ADL transfers, ability to sequence and follow directions, bed mobility tech, and functional UE strength.          Olivia Dobbs FABIÁN

## 2022-12-16 NOTE — PLAN OF CARE
Problem: Discharge Planning  Goal: Discharge to home or other facility with appropriate resources  12/16/2022 0331 by Emmett Boast, RN  Outcome: Progressing  12/15/2022 1640 by Glen Otero RN  Outcome: Progressing  Flowsheets (Taken 12/15/2022 1640)  Discharge to home or other facility with appropriate resources:   Identify barriers to discharge with patient and caregiver   Arrange for needed discharge resources and transportation as appropriate     Problem: Skin/Tissue Integrity  Goal: Absence of new skin breakdown  Description: 1. Monitor for areas of redness and/or skin breakdown  2. Assess vascular access sites hourly  3. Every 4-6 hours minimum:  Change oxygen saturation probe site  4. Every 4-6 hours:  If on nasal continuous positive airway pressure, respiratory therapy assess nares and determine need for appliance change or resting period.   12/16/2022 0331 by Emmett Boast, RN  Outcome: Progressing  12/15/2022 1640 by Glen Otero RN  Outcome: Progressing     Problem: Safety - Adult  Goal: Free from fall injury  12/16/2022 0331 by Emmett Boast, RN  Outcome: Progressing  12/15/2022 1640 by Glen Otero RN  Outcome: Progressing  Flowsheets (Taken 12/15/2022 1640)  Free From Fall Injury: Instruct family/caregiver on patient safety     Problem: ABCDS Injury Assessment  Goal: Absence of physical injury  12/16/2022 0331 by Emmett Boast, RN  Outcome: Progressing  12/15/2022 1640 by Glen Otero RN  Outcome: Progressing  Flowsheets (Taken 12/15/2022 1640)  Absence of Physical Injury: Implement safety measures based on patient assessment

## 2022-12-16 NOTE — PROGRESS NOTES
[facility]  Progress Note    Angeles Block  MRN: [id number]  Date: 12/16/2022  LOS: 7     Reason for F/U :   Suspected SBP    Impression :   Ascites of unknown origin   No abnormalities of LFTs or the liver noted on CT abdomen 12/9  Studies: Serum albumin ratio 0.6 and ascites: Serum creatinine ratio 0.95 ruling out portal hypertension and no perforations of the bladder  Fluid cell count with PMN <250, not consistent with an infection and cultures without microbial growth  AFB Stain 12/12 negative of Ascites  Mass Effect at the [employer] with Esophageal narrowing status post biopsy  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  History of diverticulitis of the bladder and sigmoid colon without perforations or surgical correction  CT abdomen 12/9 does not show active diverticulitis  Gastroesophageal reflux disease without esophagitis  Alzheimer's dementia  Failure to thrive  Coronary artery disease, s/p CABG  BPH    Recommendations:   He is s/p paracentesis 12/12/2022 with removal of 3 L of fluid without evidence of bladder leakage or portal hypertension  Initial examination did not reveal bacteria, but many neutrophils present; cell count not consistent with SBP,   Ceftriaxone discontinued 12/14/22  MRI abdomen showed potential diffuse omental reticulations suggestive an underlying malignancy that has caused the ascites  Clinical picture supports underlying malignancy, as the patient has had weight loss, fatigue, failure to thrive, GERD, and N/V that has progressively worsened over the last several months  GI and Hem/Onc service following, an EGD completed 12/15 showing mass effect of the GE junction- biopsies taken at that time. If esophageal biopsy results are not definitive, will proceed to taking biopsies of omental lesions.   From an Infectious Disease standpoint, the patient does not have any clinical symptoms or laboratory findings of infection and we will sign off at this time    Infection Control Recommendations:   Universal Precautions    Discharge Planning:     Patient will need Midline Catheter Insertion/ PICC line Insertion: No  Patient will need: Home IV , Panda,  SNF,  LTAC: Undetermined  Patient willneed outpatient wound care: No    Medical Decision making / Summary of Stay:   Irene Arenas is a 81y.o.-year-old male who was initially admitted on 12/9/2022 for diffuse abdominal pain with distension. History was mainly provided by the wife and daughter who were at bedside. Abdominal problems began October where patient was only experiencing pain. CT abdomen imaging revealed diverticulitis of the bladder; treated by Dr. Sue Mueller who performed a cystoscopy to \"drain all the pus\" and then with Levaquin. Patient tolerated the medication for 5 days before worsening of neuropathy to the point where the patient was unable to walk- Levaquin discontinued. After cystoscopy, patient developed GERD, diarrhea 2-3x per day that was watery and rust colored, and further worsening abdominal pain requiring another ED visit to [facility] 11/8 where CT abdomen revealed sigmoid colon diverticulitis treated with an antibiotic, but family could not recall name. No surgery was needed at time. However with treatment, symptoms worsened and by 11/28, patient had to be hospitalized due to weakness and continued diverticulitis that required Flagyl. Patient presented to [address] [place] [address] after worsening weight loss, decreased appetite, abdominal pain, increased distension, and recurrent vomiting productive of yellow mucus. CT abdomen showed ascites without diverticulitis, perforations, or abscess. Liver function test without abnormalities. No leukocytosis. PMHx significant for CAD s/p CABG, BPH, hyperlipidemia, Alzheimer's, GERD, nephrolithiasis, and bilateral lower extremity neuropathy. Last colonoscopy was 2017 without abnormal findings per family.       Today, patient denies fevers, chills, SOB, cough, dysuria, hematuria, hematochezia, and melena. Positive for abdominal pain, nausea without vomiting, diarrhea, GERD, distension, and abdominal pain. Current evaluation:2022    /74   Pulse 81   Temp 98.1 °F (36.7 °C) (Oral)   Resp 16   Ht 5' 9\" (1.753 m)   Wt 159 lb (72.1 kg)   SpO2 93%   BMI 23.48 kg/m²     Temperature Range: Temp: 98.1 °F (36.7 °C) Temp  Av.1 °F (36.7 °C)  Min: 97.3 °F (36.3 °C)  Max: 98.6 °F (37 °C)    Patient seen and evaluated at bedside; wife and son present upon evaluation  Some abdominal pain with movement and N/V. Wife has noticed some drenching sweats occassionally throughout the day. Diarrhea improving. Afebrile, vital signs stable. Review of Systems   Constitutional:  Positive for appetite change (decreased appetitie), chills, diaphoresis and fatigue. Negative for fever. HENT: Negative. Respiratory: Negative. Negative for cough and shortness of breath. Cardiovascular: Negative. Negative for chest pain. Gastrointestinal:  Positive for abdominal pain, nausea and vomiting (yellow mucus). Negative for diarrhea. Genitourinary: Negative. Negative for dysuria. Albright in place   Musculoskeletal: Negative. Negative for arthralgias and myalgias. Skin: Negative. Neurological: Negative. Negative for dizziness and headaches. Physical Examination :     Physical Exam  Constitutional:       Appearance: Normal appearance. He is normal weight. HENT:      Head: Normocephalic and atraumatic. Cardiovascular:      Rate and Rhythm: Regular rhythm. Heart sounds: No murmur heard. No friction rub. No gallop. Pulmonary:      Effort: Pulmonary effort is normal.      Breath sounds: Normal breath sounds. No wheezing, rhonchi or rales. Abdominal:      General: Abdomen is protuberant. Bowel sounds are normal. There is distension. Palpations: There is no mass. Tenderness:  There is generalized abdominal tenderness. Hernia: No hernia is present. Musculoskeletal:         General: Normal range of motion. Cervical back: Normal range of motion and neck supple. Right lower leg: No edema. Left lower leg: No edema. Lymphadenopathy:      Cervical: No cervical adenopathy. Skin:     General: Skin is warm and dry. Neurological:      General: No focal deficit present. Mental Status: He is alert and oriented to person, place, and time. Motor: No weakness. Laboratory data:   I have independently reviewed the followinglabs:  CBC with Differential:   Recent Labs     12/15/22  0514 12/16/22  0519   WBC 9.9 10.8   HGB 11.9* 12.3*   HCT 39.1* 39.8*    364   LYMPHOPCT 11* 8*   MONOPCT 14* 11       BMP:   Recent Labs     12/15/22  0514 12/16/22  0519    137   K 3.4* 3.9    104   CO2 26 26   BUN 3* 5*   CREATININE 0.62* 0.71   MG 1.7  --        Hepatic Function Panel:   Recent Labs     12/15/22  0514 12/16/22  0519   PROT 5.2* 5.4*   LABALBU 2.3* 2.4*   BILITOT 0.3 0.3   ALKPHOS 81 87   ALT 14 12   AST 27 22         No results found for: PROCAL  No results found for: CRP  No results found for: SEDRATE      No results found for: DDIMER  Lab Results   Component Value Date/Time    FERRITIN 97 12/10/2022 10:20 AM     No results found for: LDH  No results found for: FIBRINOGEN    Results in Past 30 Days  Result Component Current Result Ref Range Previous Result Ref Range   SARS-CoV-2, Rapid Not Detected (12/9/2022) Not Detected Not in Time Range      Lab Results   Component Value Date/Time    COVID19 Not Detected 12/09/2022 08:14 PM       No results for input(s): [facility] in the last 72 hours. Imaging Studies:     MRI OF THE ABDOMEN WITH AND WITHOUT CONTRAST AND MRCP 12/14/2022 3:02 pm  Impression   1. Diffuse omental reticulation with associated mild enhancement.   While this   could relate to the presence of mild-moderate ascites, the possibility of   omental carcinomatosis cannot be excluded. Consider omental biopsy for   further evaluation. 2. No other findings to suggest malignancy within the abdomen. Narrative   EXAMINATION:   ONE XRAY VIEW OF THE CHEST       12/13/2022 7:14 pm       COMPARISON:   10/11/2021       HISTORY:   ORDERING SYSTEM PROVIDED HISTORY: cough   TECHNOLOGIST PROVIDED HISTORY:   cough   Reason for Exam: cough       FINDINGS:   Prior sternotomy. Cardiomediastinal silhouette is unchanged in size. Left   subclavian cardiac pacing device is unchanged. There is no large pleural   effusion or pneumothorax. No pulmonary consolidation. Scattered calcified   granulomas. No acute osseous abnormality. Cultures:     Procedure Component Value Units Date/Time   Culture, Body Fluid [[id number]] (Abnormal) Collected: 12/12/22 1400   Order Status: Completed Specimen: Ascitic Fluid Updated: 12/16/22 0854    Specimen Description . ASCITIC FLUID    Direct Exam MANY NEUTROPHILS Abnormal      NO ORGANISMS SEEN    Culture NO GROWTH 4 DAYS   AFB STAIN [[id number]] (Abnormal) Collected: 12/12/22 1400   Order Status: Completed Specimen: Sputum from Ascitic Fluid Updated: 12/14/22 1022    Specimen Description . ASCITIC FLUID    Direct Exam NO ACID FAST BACILLI SEEN (DIRECT SMEAR)     An acid fast bacilli smear result should be used as an adjunct in evaluating a patient's mycobacterial disease. A culture examination is highly recommended for laboratory diagnosis. Abnormal    Gram stain [[id number]] Collected: 12/10/22 1015   Order Status: Canceled Specimen: Ascitic Fluid    COVID-19, Rapid [[id number]] Collected: 12/09/22 2014   Order Status: Completed Specimen: Nasopharyngeal Swab Updated: 12/09/22 2102    Specimen Description . NASOPHARYNGEAL SWAB    SARS-CoV-2, Rapid Not Detected    Comment:        Rapid NAAT:  The specimen is NEGATIVE for SARS-CoV-2, the novel coronavirus associated with   COVID-19.          The ID NOW COVID-19 assay is designed to detect the virus that causes COVID-19 in patients   with signs and symptoms of infection who are suspected of COVID-19. An individual without symptoms of COVID-19 and who is not shedding SARS-CoV-2 virus would   expect to have a negative (not detected) result in this assay. Negative results should be treated as presumptive and, if inconsistent with clinical signs   and symptoms or necessary for patient management,   should be tested with an alternative molecular assay. Negative results do not preclude   SARS-CoV-2 infection and   should not be used as the sole basis for patient management decisions. Fact sheet for Healthcare Providers: http://www.vinayak-radha.biandrews/   Fact sheet for Patients: http://www.vinayak-radha.biandrews/         Methodology: Isothermal Nucleic Acid Amplification       Rapid influenza A/B antigens [[id number]] Collected: 12/09/22 2014   Order Status: Completed Specimen: Nasopharyngeal Updated: 12/09/22 2043    Flu A Antigen NEGATIVE    Comment: for Influenza A Antigen       Flu B Antigen NEGATIVE    Comment: for Influenza B Antigen. Gram stain [[id number]] Collected: 12/09/22 2030   Order Status: Canceled Specimen: Ascitic Fluid    Culture, Body Fluid [[id number]] Collected: 12/09/22 2030   Order Status: Canceled Specimen:  Body Fluid from Ascitic Fluid        Medications:      sodium chloride flush  5-40 mL IntraVENous 2 times per day    sodium chloride  50 mL IntraVENous Once    famotidine  40 mg Oral BID    rosuvastatin  10 mg Oral Daily    pantoprazole  40 mg Oral QAM AC    amitriptyline  25 mg Oral Nightly    donepezil  10 mg Oral BID    finasteride  5 mg Oral Daily    magnesium oxide  400 mg Oral Daily    melatonin  9 mg Oral Nightly    pregabalin  100 mg Oral BID    sucralfate  1 g Oral 4x Daily    enoxaparin  40 mg SubCUTAneous Daily           [facility]  Lucy Perfect  [employer] messaging  OFFICE: [phone]      Electronically signed by Lucy Dunne on 12/16/2022 at 7:47 AM    I have discussed the care of Freya [address] [place] [address] including pertinent history and exam findings,  with the student. I have seen and examined the patient and the key elements of all parts of the encounter have been performed by me. I agree with the assessment, plan and orders as documented by the student. Electronically signed by Ced Rudolph MD on 12/16/2022 at 5:49 PM  [employer] messaging [phone]    Thank you for allowing us to participate in the care of this patient. Please call with questions. This note iscreated with the assistance of a speech recognition program.  While intending to generate a document that actually reflects the content of the visit, the document can still have some errors including those of syntax andsound a like substitutions which may escape proof reading. In such instances, actual meaning can be extrapolated by contextual diversion.

## 2022-12-16 NOTE — CARE COORDINATION
Social Work-[facility] will admit on the weekend, if he is ready for dc. Please contact Leland at [phone] or Christos Adkins at [phone]. Orders will need to be faxed to [phone] and report [phone]. Notify wife.  Grayson Vanges

## 2022-12-16 NOTE — PROGRESS NOTES
Family reviewed nutritional needs with dietician and discussed whether TPN appropriate. They have agreed to PEG tube and Alisha Meier NP has been notified. She will discuss with Dr. Leia Sales; it is possible that IR may be consulted. Dr. Dorcas Bonner rounding and reviewed with him as well. He will await recommendation from GI. Family informed.

## 2022-12-16 NOTE — CARE COORDINATION
Discharge planning    Chart reviewed. Per [facility] will accept at discharge. Patient s/p EGD with results as follows: EVIDENCE OF MASS EFFECT AND NARROWING AT THE GEJ RETROFLEXION AT THE CARDIA REVEALED MASS EFFECT AS WELL MULTIPLE BIOPSIES AND PICTURES WERE TAKEN    ID, urology and Hem/onc are following. May benefit from palliative care when appropriate per attending. Hem/onc will wait for final path results before further recommendations.

## 2022-12-16 NOTE — PROGRESS NOTES
Physical Therapy  Facility/Department: [facility] PROGRESSIVE CARE  Daily Treatment Note  NAME: Ruchi Limon  : 1940  MRN: [id number]    Date of Service: 2022    Discharge Recommendations:  Patient would benefit from continued therapy after discharge   PT Equipment Recommendations  Equipment Needed: No  Pt currently functioning below baseline. Recommend daily inpatient skilled therapy at time of discharge to maximize long term outcomes and prevent re-admission. Please refer to AM-PAC score for current level of function. Patient Diagnosis(es): The primary encounter diagnosis was Lower abdominal pain. Diagnoses of Other ascites, Failure to thrive in adult, Diverticulosis of colon, and Dysphagia, unspecified type were also pertinent to this visit. Assessment   Assessment: Patient not feeling like himself today with increased nausea and weakness. Mod-Max x 2 A for STS transfers and all mobility performed dependent within adrianna leanne, patient declines standing with RW today. Patient will require contined skilled PT services at this time. Activity Tolerance: Patient limited by pain; Patient limited by fatigue;Patient limited by endurance  Equipment Needed: No   AM-PAC Score: 9  Plan    Physcial Therapy Plan  General Plan: 5-7 times per week  Current Treatment Recommendations: Strengthening;ROM;Balance training;Functional mobility training;Transfer training;ADL/Self-care training;Cognitive/Perceptual training; Endurance training;Gait training;Neuromuscular re-education;Home exercise program;Safety education & training;Patient/Caregiver education & training;Positioning; Therapeutic activities     Restrictions  Restrictions/Precautions  Restrictions/Precautions: Fall Risk, Bed Alarm, General Precautions, Up as Tolerated  Required Braces or Orthoses?: No  Position Activity Restriction  Other position/activity restrictions: IV, telemetry     Subjective    Subjective  Subjective: Patient sleeping in bed upon writer's arrival  Patient agreeable to PT treatment. RN states patient is appropriate for PT treatment this date. Orientation  Overall Orientation Status: Within Functional Limits  Orientation Level: Oriented X4  Cognition  Overall Cognitive Status: Exceptions  Arousal/Alertness: Appropriate responses to stimuli;Delayed responses to stimuli  Following Commands: Follows one step commands with repetition; Follows one step commands with increased time; Inconsistently follows commands  Attention Span: Appears intact; Attends with cues to redirect; Difficulty attending to directions  Memory: Decreased short term memory;Decreased recall of recent events  Safety Judgement: Decreased awareness of need for assistance;Decreased awareness of need for safety  Problem Solving: Assistance required to generate solutions;Assistance required to implement solutions;Assistance required to identify errors made;Assistance required to correct errors made;Decreased awareness of errors  Insights: Decreased awareness of deficits  Initiation: Requires cues for all  Sequencing: Requires cues for all     Objective   Bed Mobility Training  Bed Mobility Training: Yes  Overall Level of Assistance: Moderate assistance;Maximum assistance;Assist X2  Interventions: Safety awareness training; Tactile cues; Verbal cues  Rolling: Moderate assistance;Maximum assistance;Assist X2  Supine to Sit: Moderate assistance;Maximum assistance;Assist X2  Sit to Supine:  (Patient remained up in chair at writer's exit.)  Scooting: Moderate assistance;Maximum assistance;Assist X2  Transfer Training  Transfer Training: Yes  Overall Level of Assistance: Moderate assistance;Maximum assistance;Assist X2  Interventions: Safety awareness training; Tactile cues; Verbal cues  Sit to Stand: Moderate assistance;Maximum assistance;Assist X2  Stand to Sit: Moderate assistance;Maximum assistance;Assist X2  Stand Pivot Transfers:  Total assistance  Bed to Chair: Total assistance  Comment: STS from [address] with the [address] [facility] stedy for safety as patient reports feeling weak today, but demo'd Mod x 2 A. Then STS x 2 reps for recliner with Mod-Max x 2 A. Patient stood x 25-30 seconds each reps but easily fatigued. VCs for pursed lip breathing. Required VCs for rocking motion to initiate STS. Gait Training  Gait Training: No (not appropraite)  Neuromuscular Education  Neuromuscular Education: Yes  Treatment: Sitting;Standing  Neuromuscular Comments: VCs req for proper breathing sharmila (pursed lip breathing) during functional mobility. Tactile and VCs req for postural control during sit<>stands & amb to promote abdominal and erector spinae mm facilitation for increased stability and balance, decreasing kyphosis of the spine. Pt req VCs to correct for forward WS with squatting in addition to pressing firmly into ground with feet, to promote the appropriate body mechanics for sit<>stand transfers. Safety Devices  Type of Devices: Gait belt;Patient at risk for falls;Nurse notified; All fall risk precautions in place; Left in chair  Restraints  Restraints Initially in Place: No       Goals  Short Term Goals  Time Frame for Short Term Goals: 12 visits  Short Term Goal 1: to be independent with all bed mobility  Short Term Goal 2: to be independent with all transfers  Short Term Goal 3: to require min A x 1 for ambulation up to 10'  Short Term Goal 4: to be able to participate in at lesat 25 min of PT    Education  Patient Education  Education Given To: Patient; Family  Education Method: Verbal  Barriers to Learning: Cognition; Hearing  Education Outcome: Verbalized understanding;Continued education needed    Therapy Time   Individual Concurrent Group Co-treatment   Time In       [address] [place] [address] Drive   Time Out       1   Minutes       25       Co-treatment with OT warranted secondary to decreased safety and independence requiring 2 skilled therapy professionals to address individual discipline's goals.  PT addressing pre gait trunk strengthening, weight shifting prior to transfers, transfer training, and postural control in sitting/standing.      Shantal Landry, PTA

## 2022-12-16 NOTE — PROGRESS NOTES
[facility]   Urology Progress Note            Subjective: Follow-up urinary retention enlarged prostate hypotonic bladder    Patient Vitals for the past 24 hrs:   BP Temp Temp src Pulse Resp SpO2 Weight   12/16/22 1126 111/64 97.5 °F (36.4 °C) -- 55 12 94 % --   12/16/22 0830 116/68 97.5 °F (36.4 °C) -- 62 14 93 % --   12/16/22 0548 -- -- -- -- -- -- 159 lb (72.1 kg)   12/16/22 0527 135/74 98.1 °F (36.7 °C) Oral 81 16 93 % --   12/15/22 2352 123/62 98.6 °F (37 °C) Oral 77 16 93 % --   12/15/22 2036 121/80 98.6 °F (37 °C) Oral 58 16 93 % --   12/15/22 1528 114/73 98.2 °F (36.8 °C) Oral 68 18 94 % --   12/15/22 1201 117/71 97.7 °F (36.5 °C) -- 81 18 93 % --       Intake/Output Summary (Last 24 hours) at 12/16/2022 1150  Last data filed at 12/16/2022 0549  Gross per 24 hour   Intake --   Output 1775 ml   Net -1775 ml       Recent Labs     12/14/22  0527 12/15/22  0514 12/16/22  0519   WBC 9.1 9.9 10.8   HGB 12.0* 11.9* 12.3*   HCT 38.3* 39.1* 39.8*   MCV 89.3 90.5 88.6    333 364     Recent Labs     12/14/22  0527 12/15/22  0514 12/16/22  0519    135 137   K 3.6* 3.4* 3.9    101 104   CO2 27 26 26   BUN 4* 3* 5*   CREATININE 0.72 0.62* 0.71       No results for input(s): COLORU, PHUR, LABCAST, WBCUA, RBCUA, MUCUS, TRICHOMONAS, YEAST, BACTERIA, CLARITYU, SPECGRAV, LEUKOCYTESUR, UROBILINOGEN, BILIRUBINUR, BLOODU in the last 72 hours.     Invalid input(s): NITRATE, GLUCOSEUKETONESUAMORPHOUS    Additional Lab/culture results:    Physical Exam: Patient tolerating Albright catheter, renal function stable in the normal range, no hematuria    Reviewed notes from gastroenterology discussed with family the present findings    Interval Imaging Findings:    Impression:    Patient Active Problem List   Diagnosis    Neuropathy of both feet    LBBB (left bundle branch block)    S/P CABG (coronary artery bypass graft)    Unstable angina ([address]    S/P cardiac cath    S/P ICD (internal cardiac defibrillator) procedure    Other ascites    Lower abdominal pain    Diffuse abdominal pain    Failure to thrive in adult    Anorexia    Severe malnutrition (HCC)       Plan: Maintain indwelling Albright until ready for discharge    Vero Lerner MD  11:50 AM 12/16/2022

## 2022-12-17 PROBLEM — K22.89 ESOPHAGEAL MASS: Status: ACTIVE | Noted: 2022-12-17

## 2022-12-17 PROBLEM — R13.10 DYSPHAGIA: Status: ACTIVE | Noted: 2022-12-17

## 2022-12-17 LAB
ABSOLUTE EOS #: 0.06 K/UL (ref 0–0.44)
ABSOLUTE IMMATURE GRANULOCYTE: 0.07 K/UL (ref 0–0.3)
ABSOLUTE LYMPH #: 1.11 K/UL (ref 1.1–3.7)
ABSOLUTE MONO #: 1.22 K/UL (ref 0.1–1.2)
ALBUMIN SERPL-MCNC: 2.5 G/DL (ref 3.5–5.2)
ALP BLD-CCNC: 94 U/L (ref 40–129)
ALT SERPL-CCNC: 11 U/L (ref 5–41)
ANION GAP SERPL CALCULATED.3IONS-SCNC: 9 MMOL/L (ref 9–17)
AST SERPL-CCNC: 23 U/L
BASOPHILS # BLD: 0 % (ref 0–2)
BASOPHILS ABSOLUTE: 0.05 K/UL (ref 0–0.2)
BILIRUB SERPL-MCNC: 0.3 MG/DL (ref 0.3–1.2)
BUN BLDV-MCNC: 6 MG/DL (ref 8–23)
BUN/CREAT BLD: 7 (ref 9–20)
CALCIUM SERPL-MCNC: 8.4 MG/DL (ref 8.6–10.4)
CHLORIDE BLD-SCNC: 99 MMOL/L (ref 98–107)
CO2: 28 MMOL/L (ref 20–31)
CREAT SERPL-MCNC: 0.86 MG/DL (ref 0.7–1.2)
EOSINOPHILS RELATIVE PERCENT: 1 % (ref 1–4)
GFR SERPL CREATININE-BSD FRML MDRD: >60 ML/MIN/1.73M2
GLUCOSE BLD-MCNC: 128 MG/DL (ref 70–99)
HCT VFR BLD CALC: 41.6 % (ref 40.7–50.3)
HEMOGLOBIN: 13 G/DL (ref 13–17)
IMMATURE GRANULOCYTES: 1 %
LYMPHOCYTES # BLD: 10 % (ref 24–43)
MCH RBC QN AUTO: 27.5 PG (ref 25.2–33.5)
MCHC RBC AUTO-ENTMCNC: 31.3 G/DL (ref 28.4–34.8)
MCV RBC AUTO: 87.9 FL (ref 82.6–102.9)
MONOCYTES # BLD: 11 % (ref 3–12)
NRBC AUTOMATED: 0 PER 100 WBC
PDW BLD-RTO: 13.5 % (ref 11.8–14.4)
PLATELET # BLD: 381 K/UL (ref 138–453)
PMV BLD AUTO: 10.3 FL (ref 8.1–13.5)
POTASSIUM SERPL-SCNC: 4.1 MMOL/L (ref 3.7–5.3)
RBC # BLD: 4.73 M/UL (ref 4.21–5.77)
SEG NEUTROPHILS: 77 % (ref 36–65)
SEGMENTED NEUTROPHILS ABSOLUTE COUNT: 8.85 K/UL (ref 1.5–8.1)
SODIUM BLD-SCNC: 136 MMOL/L (ref 135–144)
TOTAL PROTEIN: 5.7 G/DL (ref 6.4–8.3)
WBC # BLD: 11.4 K/UL (ref 3.5–11.3)

## 2022-12-17 PROCEDURE — 6360000002 HC RX W HCPCS: Performed by: FAMILY MEDICINE

## 2022-12-17 PROCEDURE — 6360000002 HC RX W HCPCS: Performed by: INTERNAL MEDICINE

## 2022-12-17 PROCEDURE — 51702 INSERT TEMP BLADDER CATH: CPT

## 2022-12-17 PROCEDURE — 80053 COMPREHEN METABOLIC PANEL: CPT

## 2022-12-17 PROCEDURE — 2580000003 HC RX 258: Performed by: INTERNAL MEDICINE

## 2022-12-17 PROCEDURE — 85025 COMPLETE CBC W/AUTO DIFF WBC: CPT

## 2022-12-17 PROCEDURE — 6370000000 HC RX 637 (ALT 250 FOR IP): Performed by: INTERNAL MEDICINE

## 2022-12-17 PROCEDURE — 2060000000 HC ICU INTERMEDIATE R&B

## 2022-12-17 PROCEDURE — 36415 COLL VENOUS BLD VENIPUNCTURE: CPT

## 2022-12-17 PROCEDURE — 99232 SBSQ HOSP IP/OBS MODERATE 35: CPT | Performed by: INTERNAL MEDICINE

## 2022-12-17 PROCEDURE — 2580000003 HC RX 258: Performed by: STUDENT IN AN ORGANIZED HEALTH CARE EDUCATION/TRAINING PROGRAM

## 2022-12-17 PROCEDURE — APPSS30 APP SPLIT SHARED TIME 16-30 MINUTES: Performed by: NURSE PRACTITIONER

## 2022-12-17 RX ORDER — METOPROLOL TARTRATE 5 MG/5ML
5 INJECTION INTRAVENOUS
Status: ACTIVE | OUTPATIENT
Start: 2022-12-17 | End: 2022-12-18

## 2022-12-17 RX ORDER — FENTANYL CITRATE 0.05 MG/ML
25 INJECTION, SOLUTION INTRAMUSCULAR; INTRAVENOUS EVERY 4 HOURS PRN
Status: DISCONTINUED | OUTPATIENT
Start: 2022-12-17 | End: 2022-12-20 | Stop reason: HOSPADM

## 2022-12-17 RX ADMIN — Medication 400 MG: at 09:01

## 2022-12-17 RX ADMIN — DONEPEZIL HYDROCHLORIDE 10 MG: 10 TABLET, FILM COATED ORAL at 09:01

## 2022-12-17 RX ADMIN — Medication 9 MG: at 20:21

## 2022-12-17 RX ADMIN — PREGABALIN 100 MG: 100 CAPSULE ORAL at 09:02

## 2022-12-17 RX ADMIN — FAMOTIDINE 40 MG: 20 TABLET, FILM COATED ORAL at 09:01

## 2022-12-17 RX ADMIN — PREGABALIN 100 MG: 100 CAPSULE ORAL at 20:30

## 2022-12-17 RX ADMIN — AMITRIPTYLINE HYDROCHLORIDE 25 MG: 25 TABLET, FILM COATED ORAL at 20:29

## 2022-12-17 RX ADMIN — SUCRALFATE 1 G: 1 TABLET ORAL at 12:05

## 2022-12-17 RX ADMIN — ROSUVASTATIN CALCIUM 10 MG: 10 TABLET, FILM COATED ORAL at 09:01

## 2022-12-17 RX ADMIN — SUCRALFATE 1 G: 1 TABLET ORAL at 20:21

## 2022-12-17 RX ADMIN — FENTANYL CITRATE 25 MCG: 0.05 INJECTION, SOLUTION INTRAMUSCULAR; INTRAVENOUS at 11:15

## 2022-12-17 RX ADMIN — FINASTERIDE 5 MG: 5 TABLET, FILM COATED ORAL at 09:01

## 2022-12-17 RX ADMIN — SUCRALFATE 1 G: 1 TABLET ORAL at 09:01

## 2022-12-17 RX ADMIN — SUCRALFATE 1 G: 1 TABLET ORAL at 16:37

## 2022-12-17 RX ADMIN — FENTANYL CITRATE 25 MCG: 0.05 INJECTION, SOLUTION INTRAMUSCULAR; INTRAVENOUS at 15:51

## 2022-12-17 RX ADMIN — PANTOPRAZOLE SODIUM 40 MG: 40 TABLET, DELAYED RELEASE ORAL at 05:26

## 2022-12-17 RX ADMIN — SODIUM CHLORIDE, PRESERVATIVE FREE 10 ML: 5 INJECTION INTRAVENOUS at 09:08

## 2022-12-17 RX ADMIN — SODIUM CHLORIDE, PRESERVATIVE FREE 10 ML: 5 INJECTION INTRAVENOUS at 20:33

## 2022-12-17 RX ADMIN — FENTANYL CITRATE 25 MCG: 0.05 INJECTION, SOLUTION INTRAMUSCULAR; INTRAVENOUS at 20:29

## 2022-12-17 RX ADMIN — FAMOTIDINE 40 MG: 20 TABLET, FILM COATED ORAL at 20:21

## 2022-12-17 RX ADMIN — ENOXAPARIN SODIUM 40 MG: 100 INJECTION SUBCUTANEOUS at 09:02

## 2022-12-17 RX ADMIN — DEXTROSE AND SODIUM CHLORIDE: 5; 450 INJECTION, SOLUTION INTRAVENOUS at 10:56

## 2022-12-17 RX ADMIN — DONEPEZIL HYDROCHLORIDE 10 MG: 10 TABLET, FILM COATED ORAL at 20:21

## 2022-12-17 RX ADMIN — ONDANSETRON 4 MG: 2 INJECTION INTRAMUSCULAR; INTRAVENOUS at 10:48

## 2022-12-17 ASSESSMENT — PAIN DESCRIPTION - ORIENTATION
ORIENTATION: LEFT;RIGHT
ORIENTATION: LEFT

## 2022-12-17 ASSESSMENT — PAIN SCALES - GENERAL
PAINLEVEL_OUTOF10: 8
PAINLEVEL_OUTOF10: 10
PAINLEVEL_OUTOF10: 7

## 2022-12-17 ASSESSMENT — PAIN DESCRIPTION - ONSET
ONSET: ON-GOING
ONSET: ON-GOING

## 2022-12-17 ASSESSMENT — PAIN DESCRIPTION - LOCATION
LOCATION: FOOT;LEG
LOCATION: ABDOMEN;LEG
LOCATION: ABDOMEN

## 2022-12-17 ASSESSMENT — PAIN - FUNCTIONAL ASSESSMENT
PAIN_FUNCTIONAL_ASSESSMENT: PREVENTS OR INTERFERES SOME ACTIVE ACTIVITIES AND ADLS
PAIN_FUNCTIONAL_ASSESSMENT: PREVENTS OR INTERFERES SOME ACTIVE ACTIVITIES AND ADLS

## 2022-12-17 ASSESSMENT — PAIN DESCRIPTION - DESCRIPTORS
DESCRIPTORS: ACHING
DESCRIPTORS: ACHING;TENDER

## 2022-12-17 ASSESSMENT — PAIN DESCRIPTION - FREQUENCY
FREQUENCY: CONTINUOUS
FREQUENCY: CONTINUOUS

## 2022-12-17 ASSESSMENT — PAIN DESCRIPTION - PAIN TYPE
TYPE: ACUTE PAIN
TYPE: ACUTE PAIN;CHRONIC PAIN

## 2022-12-17 NOTE — PLAN OF CARE
Problem: Discharge Planning  Goal: Discharge to home or other facility with appropriate resources  12/17/2022 0950 by Kevin Martinez RN  Outcome: Progressing  Flowsheets (Taken 12/17/2022 0910)  Discharge to home or other facility with appropriate resources:   Identify barriers to discharge with patient and caregiver   Arrange for needed discharge resources and transportation as appropriate   Identify discharge learning needs (meds, wound care, etc)   Refer to discharge planning if patient needs post-hospital services based on physician order or complex needs related to functional status, cognitive ability or social support system  12/17/2022 0303 by Joseph Alex RN  Outcome: Progressing  Flowsheets (Taken 12/16/2022 2000)  Discharge to home or other facility with appropriate resources:   Identify barriers to discharge with patient and caregiver   Arrange for needed discharge resources and transportation as appropriate   Identify discharge learning needs (meds, wound care, etc)     Problem: Skin/Tissue Integrity  Goal: Absence of new skin breakdown  Description: 1. Monitor for areas of redness and/or skin breakdown  2. Assess vascular access sites hourly  3. Every 4-6 hours minimum:  Change oxygen saturation probe site  4. Every 4-6 hours:  If on nasal continuous positive airway pressure, respiratory therapy assess nares and determine need for appliance change or resting period.   12/17/2022 0950 by Kevin Martinez RN  Outcome: Progressing  12/17/2022 0303 by Joseph Alex RN  Outcome: Progressing     Problem: Safety - Adult  Goal: Free from fall injury  12/17/2022 0950 by Kevin Martinez RN  Outcome: Progressing  12/17/2022 0303 by Joseph Alex RN  Outcome: Progressing     Problem: ABCDS Injury Assessment  Goal: Absence of physical injury  12/17/2022 0950 by Kevin Martinez RN  Outcome: Progressing  12/17/2022 0303 by Joseph Alex RN  Outcome: Progressing     Problem: Genitourinary - Adult  Goal: Absence of urinary retention  12/17/2022 0950 by Torito Alcaraz RN  Outcome: Progressing  12/17/2022 0303 by Lyn Vick RN  Outcome: Progressing  Flowsheets (Taken 12/16/2022 2000)  Absence of urinary retention:   Assess patients ability to void and empty bladder   Monitor intake/output and perform bladder scan as needed  Goal: Urinary catheter remains patent  12/17/2022 0950 by Torito Alcaraz RN  Outcome: Progressing  Flowsheets (Taken 12/17/2022 0910)  Urinary catheter remains patent: Assess patency of urinary catheter  12/17/2022 0303 by Lyn Vick RN  Outcome: Progressing  Flowsheets (Taken 12/16/2022 2000)  Urinary catheter remains patent: Assess patency of urinary catheter     Problem: Nutrition Deficit:  Goal: Optimize nutritional status  12/17/2022 0950 by Torito Alcaraz RN  Outcome: Progressing  12/17/2022 0303 by Lyn Vick RN  Outcome: Progressing     Problem: Pain  Goal: Verbalizes/displays adequate comfort level or baseline comfort level  12/17/2022 0950 by Torito Alcaraz RN  Outcome: Progressing  12/17/2022 0303 by Lyn Vick RN  Outcome: Progressing

## 2022-12-17 NOTE — PROGRESS NOTES
Comprehensive Nutrition Assessment    Type and Reason for Visit:  Reassess    Nutrition Recommendations/Plan:   Continue Clear Liquid diet with Ensure Clear each meal tray. [place] patient and family food preferences as requested and appropriate. Malnutrition Assessment:  Malnutrition Status:  Severe malnutrition (12/12/22 1854)    Context:  Chronic Illness     Findings of the 6 clinical characteristics of malnutrition:  Energy Intake:  75% or less estimated energy requirements for 1 month or longer  Weight Loss:  Unable to assess     Body Fat Loss:  Severe body fat loss Triceps, Orbital   Muscle Mass Loss:  Severe muscle mass loss Temples (temporalis), Clavicles (pectoralis & deltoids)  Fluid Accumulation:  Severe Ascites   Strength:  Not Performed    Nutrition Assessment:    Patient remains on clear liquid diet with minimal oral intakes. Pathology results from the biopsy done 12/15/22 from the lower esophagus is positive for adenocarcinoma. 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 Per hematology note considering patient's performance with underlying dementia and other health comorbidities, patient may have significant toxicity and further deterioration in quality of life and treatment is palliative systemic chemoimmunotherapy. After lengthy discussion with physician about his cancer and prognosis, patient and family decided to pursue hospice care. Will continue Clear Liquid diet and Ensure Clear with each meal and honor patient and family food preferences as requested. Nutrition Related Findings:    No edema. Bowel sounds hypoactive all quadrants. Distention. MBSS (12/10) Wound Type: None       Current Nutrition Intake & Therapies:    Average Meal Intake: 0%, 1-25%  Average Supplements Intake: 1-25%  ADULT DIET;  Clear Liquid  ADULT ORAL NUTRITION SUPPLEMENT; Breakfast, Lunch, Dinner; Clear Liquid Oral Supplement    Anthropometric Measures:  Height: 5' 9\" (175.3 cm)  Ideal Body Weight (IBW): 160 lbs (73 kg)       Current Body Weight: 159 lb (72.1 kg) (weight variation likely related to shift in fluids), 90.6 % IBW.     Current BMI (kg/m2): 23.5                          BMI Categories: Normal Weight (BMI 22.0 to 24.9) age over 72    Estimated Daily Nutrient Needs:  Energy Requirements Based On: Kcal/kg  Weight Used for Energy Requirements: Current  Energy (kcal/day): 1975 kcal (30 mL/hr)  Weight Used for Protein Requirements: Current  Protein (g/day): 86-99 gm of protein (1.3-1.5 gm/kg)       Nutrition Diagnosis:   Severe malnutrition related to inadequate protein-energy intake as evidenced by intake 26-50%, intake 0-25%, weight loss, severe muscle loss, severe loss of subcutaneous fat    Nutrition Interventions:   Food and/or Nutrient Delivery: Continue Current Diet, Start Oral Nutrition Supplement  Nutrition Education/Counseling: Education not indicated  Coordination of Nutrition Care: Continue to monitor while inpatient       Goals:     Goals: PO intake 50% or greater       Nutrition Monitoring and Evaluation:   Behavioral-Environmental Outcomes: None Identified  Food/Nutrient Intake Outcomes: Diet Advancement/Tolerance, Food and Nutrient Intake, Supplement Intake  Physical Signs/Symptoms Outcomes: Biochemical Data, Fluid Status or Edema, Weight, Skin, GI Status    Discharge Planning:    Continue current diet, Continue Oral Nutrition Supplement       Some areas of assessment may be incomplete due to COVID-19 precautions    Ruth Santiago RD, LD  Office phone [phone]

## 2022-12-17 NOTE — PROGRESS NOTES
Pt remained calm throughout shift. Pt got up to chair around 4:30 am and went back to bed at around 5:15 am. Granddaughter remained at bedside throughout the shift. Albright draining. Pt tolerated pils whole with liquids.  Will continue with care plan

## 2022-12-17 NOTE — PROGRESS NOTES
[facility]    Gastroenterology Daily Progress Note      Patient:   Vicky Begum   :    1940   Facility:   [facility]  Date:     2022  Consultant:   MAJO Cárdenas - CNP, CNP      SUBJECTIVE  81 y.o. male admitted 2022 with Diverticulosis of colon [K57.30]  Other ascites [R18.8]  Lower abdominal pain [R10.30]  Failure to thrive in adult [R62.7]  Ascites of liver [R18.8] and seen for esophageal cancer. The pt was seen and examined. Pt is alert, reports mid abdominal pain. No nausea or emesis. Daughter at bedside states she is requesting hospice meeting to ''''take dad home\".  .        OBJECTIVE  Scheduled Meds:   sodium chloride flush  5-40 mL IntraVENous 2 times per day    sodium chloride  50 mL IntraVENous Once    famotidine  40 mg Oral BID    rosuvastatin  10 mg Oral Daily    pantoprazole  40 mg Oral QAM AC    amitriptyline  25 mg Oral Nightly    donepezil  10 mg Oral BID    finasteride  5 mg Oral Daily    magnesium oxide  400 mg Oral Daily    melatonin  9 mg Oral Nightly    pregabalin  100 mg Oral BID    sucralfate  1 g Oral 4x Daily    enoxaparin  40 mg SubCUTAneous Daily       Vital Signs:  /65   Pulse 72   Temp 98.4 °F (36.9 °C) (Oral)   Resp 18   Ht 5' 9\" (1.753 m)   Wt 159 lb (72.1 kg)   SpO2 92%   BMI 23.48 kg/m²      Physical Exam:     General Appearance: alert and oriented to person, place and time, well-developed and mal-nourished, in no acute distress  Skin: warm and dry, no rash or erythema  Head: normocephalic and atraumatic  Eyes: pupils equal, round, and reactive to light, extraocular eye movements intact, conjunctivae normal  ENT: hearing grossly normal bilaterally  Neck: neck supple and non tender without mass, no thyromegaly or thyroid nodules, no cervical lymphadenopathy   Pulmonary/Chest: clear to auscultation bilaterally- no wheezes, rales or rhonchi, normal air movement, no respiratory distress  Cardiovascular: normal rate, regular rhythm, normal S1 and S2, no murmurs, rubs, clicks or gallops, distal pulses intact, no carotid bruits  Abdomen: soft, non-tender, distended, normal bowel sounds, no masses or organomegaly positive for ascites  Extremities: no cyanosis, clubbing or edema   Musculoskeletal: normal range of motion, no joint swelling, deformity or tenderness  Neurologic: no cranial nerve deficit and muscle strength normal    Lab and Imaging Review     CBC  Recent Labs     12/15/22  0514 12/16/22  0519 12/17/22  0517   WBC 9.9 10.8 11.4*   HGB 11.9* 12.3* 13.0   HCT 39.1* 39.8* 41.6   MCV 90.5 88.6 87.9    364 381       BMP  Recent Labs     12/15/22  0514 12/16/22  0519 12/17/22  0517    137 136   K 3.4* 3.9 4.1    104 99   CO2 26 26 28   BUN 3* 5* 6*   CREATININE 0.62* 0.71 0.86   GLUCOSE 120* 126* 128*   CALCIUM 8.1* 8.3* 8.4*      Latest Reference Range & Units 12/14/22 16:15   AFP (Alpha Fetoprotein) <8.4 ug/L 3.4      Latest Reference Range & Units 12/10/22 10:20   Ceruloplasmin 15 - 30 mg/dL 21      Latest Reference Range & Units 12/10/22 10:20   A-1 Antitrypsin 90 - 200 mg/dL 175      Latest Reference Range & Units 12/10/22 10:20   Hep A IgM NONREACTIVE  NONREACTIVE   Hepatitis B Surface Ag NONREACTIVE  NONREACTIVE   Hepatitis C Ab NONREACTIVE  NONREACTIVE   Hep B Core Ab, IgM NONREACTIVE  NONREACTIVE      Latest Reference Range & Units 12/10/22 10:20   KRISTEN NEGATIVE  POSITIVE !   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 Fragments of gastric-type mucosa with reactive epithelial changes,   negative for intestinal metaplasia. AYANNA Lala   FINDINGS:ct abd 12/9/22   Lower Chest: The lung bases are clear. Bilateral gynecomastia is noted. Organs: The liver, gallbladder, spleen, pancreas and adrenal glands are   intact. The kidneys reveal no evidence for hydronephrosis or renal mass. There is a large cyst within the lower pole of the left kidney. No renal   calculi are visualized. GI/Bowel: The bowel is normal in caliber. No free air is appreciated. There   is reticulation of the mesentery diffusely likely representing edema. Moderate ascites is appreciated primary involving the right and left   paracolic gutters. Severe left colon diverticulosis is identified without   evidence of acute diverticulitis. Pelvis: Numerous bladder diverticula are identified. No bladder calculus is   seen. Peritoneum/Retroperitoneum: No lymphadenopathy is detected. Mild aortic   atherosclerosis is appreciated. Bones/Soft Tissues: Osteopenia is identified. Laminectomy is identified at   L4 and L5. Impression   Moderate ascites with associated moderate diffuse edema within the mesentery. Severe diverticulosis involving the left colon without evidence of acute   diverticulitis. No abscess or perforation. Numerous bladder diverticula. Laminectomy at L4 and L5. Bilateral gynecomastia. FINDINGS:mri abd 12/14/22   Image quality degraded by motion artifact. No hepatic steatosis. No liver lesion seen. Hepatic vasculature is patent. Mild-moderate ascites. Diffuse omental reticulation at the level of the   kidneys (series 1103 image 105) with associated mild enhancement. Gallbladder is unremarkable.   No biliary or pancreatic ductal dilatation. Spleen size at the upper limit of normal.  Pancreas and adrenals are   unremarkable. Kidneys demonstrate symmetric enhancement without   hydronephrosis. A large simple cyst at the inferior pole of the left kidney   measures 9.2 x 7.9 cm. No significant lymphadenopathy. Mild bowel wall   thickening in the right abdomen as seen on recent CT. Extensive colonic   diverticulosis. Osseous structures are unremarkable. Atherosclerotic   disease of the abdominal aorta without aneurysm. Bilateral gynecomastia. Susceptibility artifact from sternotomy changes. Impression   1. Diffuse omental reticulation with associated mild enhancement. While this   could relate to the presence of mild-moderate ascites, the possibility of   omental carcinomatosis cannot be excluded. Consider omental biopsy for   further evaluation. 2. No other findings to suggest malignancy within the abdomen. Findings:egd dr Juan Ramon Spear 12/15/22     Retropharyngeal area was grossly normal appearing     Esophagus: abnormal: EVIDENCE OF MASS EFFECT AND NARROWING AT THE GEJ  RETROFLEXION AT THE CARDIA REVEALED MASS EFFECT AS WELL  MULTIPLE BIOPSIES AND PICTURES WERE TAKEN     Stomach:    Fundus: abnormal: AS ABOVE    Body: normal    Antrum: normal     Duodenum:     Descending: normal    Bulb: normal      ASSESSMENT  Dysphagia s/p egd with mass at GE junction positive for malignancy  -oncology following  Carafate and pepcid  Hospice meeting per family    2.abdominal pain ascites on imaging ?cirrhosis  Low salt diet  Avoid sedatives and narcotics  Prn paracentesis    Gi signing off     Time spent reviewing chart assessing pt and d/w md around 30 minutes    This plan was formulated in collaboration with Dr. Jose A Miranda  .     Electronically signed by: MAJO Groves CNP on 12/17/2022 at 9:03 AM     Attending Physician Statement          I have discussed the care of [address] [place] [address] and   I have examined the patient myselft independently, and taken ros and hpi , including pertinent history and exam findings,  with the author of this note . I have reviewed the key elements of all parts of the encounter with the nurse practitioner/resident. I agree with the assessment, plan and orders as documented by the above health care provider     More than 50% of the time on this visit was spent by me   hysical Exam  Blood pressure 133/77, pulse 51, temperature 97.9 °F (36.6 °C), temperature source Oral, resp. rate 20, height 5' 9\" (1.753 m), weight 159 lb (72.1 kg), SpO2 94 %.          General Appearance: alert and oriented to person, place and time, well-developed and well-nourished, in no acute distress  Skin: warm and dry, no rash or erythema  Head: normocephalic and atraumatic  Eyes: pupils equal, round, and reactive to light, extraocular eye movements intact, conjunctivae normal  ENT: hearing grossly normal bilaterally  Neck: neck supple and non tender without mass, no thyromegaly or thyroid nodules, no cervical lymphadenopathy   Pulmonary/Chest: clear to auscultation bilaterally- no wheezes, rales or rhonchi, normal air movement, no respiratory distress  Cardiovascular: normal rate, regular rhythm, normal S1 and S2, no murmurs, rubs, clicks or gallops, distal pulses intact, no carotid bruits  Abdomen: soft, non-tender, non-distended, normal bowel sounds, no masses or organomegaly  Extremities: no cyanosis, clubbing or edema  Musculoskeletal: normal range of motion, no joint swelling, deformity or tenderness  Neurologic: no cranial nerve deficit and muscle strength normal    Data Review:    Recent Labs     12/15/22  0514 12/16/22  0519 12/17/22  0517   WBC 9.9 10.8 11.4*   HGB 11.9* 12.3* 13.0   HCT 39.1* 39.8* 41.6   MCV 90.5 88.6 87.9    364 381     Recent Labs     12/15/22  0514 12/16/22  0519 12/17/22  0517    137 136   K 3.4* 3.9 4.1    104 99   CO2 26 26 28   BUN 3* 5* 6*   CREATININE 0.62* 0.71 0.86     Recent Labs     12/15/22  0514 12/16/22  0519 12/17/22  0517   AST 27 22 23   ALT 14 12 11   BILITOT 0.3 0.3 0.3   ALKPHOS 81 87 94     No results for input(s): LIPASE, AMYLASE in the last 72 hours. No results for input(s): PROTIME, INR in the last 72 hours. No results for input(s): PTT in the last 72 hours. No results for input(s): OCCULTBLD in the last 72 hours.   CEA:    Lab Results   Component Value Date/Time    CEA 1.5 12/14/2022 04:15 PM     Ca 125:  No results found for:   Ca 19-9:    Lab Results   Component Value Date/Time     11 12/14/2022 04:15 PM     Ca 15-3:  No results found for:   AFP:  No components found for: AFAFP  Beta HCG:  No components found for: BHCG  Neuron Specific Enolase:  No results found for: NSE      Additional :    Oncology follow-up  Patient going for hospice unfortunately  Will be on standby please call us if needed      Electronically signed by Hammad Degroot MD

## 2022-12-17 NOTE — PROGRESS NOTES
_                         Today's Date: 12/17/2022  Patient Name: Sy Chavarria  Date of admission: 12/9/2022  5:11 PM  Patient's age: 81 y.o., 1940  Admission Dx: Diverticulosis of colon [K57.30]  Other ascites [R18.8]  Lower abdominal pain [R10.30]  Failure to thrive in adult [R62.7]  Ascites of liver [R18.8]      Requesting Physician: Radha Francis MD    CHIEF COMPLAINT: Abdominal pain. Weight loss. Ascites. SUBJECTIVE:  . Patient was seen and examined . Clinically stable. No events overnight. Family at bedside. Continues to have abdominal discomfort. Large ascites. Mental status with no changes. No fever. No GI bleeding. BRIEF CASE HISTORY:    The patient is a 81 y.o.  male who is admitted to the hospital for further management of increasing abdominal pain and failure to thrive. Patient is unable to give any history. History is obtained from the chart and talking to the wife and daughter. Patient presented to [facility] due to worsening abdominal pain and abdominal distention since October. He had weight loss and decreased appetite. Patient had recurrent episodes of vomiting yellow mucus and multiple episodes of diarrhea. He had no fever. He had abdominal CT scan which showed ascites. There was no perforation. No bowel obstruction. Patient had evaluation by ID and by gastroenterology. He will have EGD tomorrow. Last colonoscopy was 2017. Patient had multiple comorbidities including coronary artery disease and benign prostate hypertrophy as well as hyperlipidemia. And history of Alzheimer disease.     Past Medical History:   has a past medical history of Bladder stone, CAD (coronary artery disease), Diverticulitis, GERD (gastroesophageal reflux disease), Hyperlipidemia, Hypertension, Intermittent self-catheterization of bladder, LBBB (left bundle branch block), Malignant neoplasm of lower third of esophagus [facility]), MI (myocardial infarction) ([facility] [address] Neuropathy, Polio, and Wears glasses. Past Surgical History:   has a past surgical history that includes Coronary artery bypass graft (2000); Coronary angioplasty with stent; Cardiac surgery (2000); Colonoscopy; Endoscopy, colon, diagnostic; Bladder stone removal; Esophagogastroduodenoscopy (12/15/2022); and Upper gastrointestinal endoscopy (N/A, 12/15/2022). Family History: family history is not on file. Social History:   reports that he has never smoked. He has never used smokeless tobacco. He reports that he does not drink alcohol and does not use drugs. Medications:    Prior to Admission medications    Medication Sig Start Date End Date Taking? Authorizing Provider   cefdinir (OMNICEF) 300 MG capsule Take 300 mg by mouth 2 times daily 14-day course filled 12/8/22   Yes Historical Provider, MD   dicyclomine (BENTYL) 10 MG capsule Take 10 mg by mouth 2 times daily as needed   Yes Historical Provider, MD   omeprazole (PRILOSEC) 40 MG delayed release capsule Take 40 mg by mouth daily   Yes Historical Provider, MD   metroNIDAZOLE (FLAGYL) 500 MG tablet Take 500 mg by mouth 2 times daily 24-day course filled 12/1/22   Yes Historical Provider, MD   pregabalin (LYRICA) 100 MG capsule Take 100 mg by mouth 2 times daily.     Historical Provider, MD   finasteride (PROSCAR) 5 MG tablet Take 5 mg by mouth daily    Historical Provider, MD   donepezil (ARICEPT) 10 MG tablet Take 10 mg by mouth 2 times daily    Historical Provider, MD   amitriptyline (ELAVIL) 25 MG tablet Take 25-50 mg by mouth nightly    Historical Provider, MD   rosuvastatin (CRESTOR) 10 MG tablet Take 10 mg by mouth daily    Historical Provider, MD   ondansetron (ZOFRAN) 4 MG tablet Take 4 mg by mouth every 8 hours as needed for Nausea or Vomiting    Historical Provider, MD   Melatonin 10 MG CAPS Take 10-20 mg by mouth nightly    Historical Provider, MD   famotidine (PEPCID) 40 MG tablet Take 40 mg by mouth 2 times daily    Historical Provider, MD   acetaminophen (TYLENOL) 500 MG tablet Take 500 mg by mouth every 6 hours as needed prn    Historical Provider, MD   Magnesium 400 MG TABS Take 400 mg by mouth Daily    Historical Provider, MD   vitamin D-3 (CHOLECALCIFEROL) 125 MCG (5000 UT) TABS Take 5,000 Units by mouth daily    Historical Provider, MD   Cyanocobalamin 2500 MCG TABS Take 5,000 mcg by mouth Daily    Historical Provider, MD   nitroGLYCERIN (NITROSTAT) 0.4 MG SL tablet Place 0.4 mg under the tongue every 5 minutes as needed for Chest pain up to max of 3 total doses. If no relief after 1 dose, call 911.  Using as needed but has not needed them    Historical Provider, MD   clopidogrel (PLAVIX) 75 MG tablet Take 75 mg by mouth daily    Historical Provider, MD   Diphenhydramine-APAP, sleep, (TYLENOL PM EXTRA STRENGTH PO) Take 1 tablet by mouth nightly as needed    Historical Provider, MD     Current Facility-Administered Medications   Medication Dose Route Frequency Provider Last Rate Last Admin    sodium chloride flush 0.9 % injection 5-40 mL  5-40 mL IntraVENous 2 times per day Ross Person MD   10 mL at 12/17/22 0908    sodium chloride flush 0.9 % injection 5-40 mL  5-40 mL IntraVENous PRN Ross Person MD        0.9 % sodium chloride infusion   IntraVENous PRN Ross Person MD        0.9 % sodium chloride bolus  50 mL IntraVENous Once Charito Figueroa MD        famotidine (PEPCID) tablet 40 mg  40 mg Oral BID Charito Figueroa MD   40 mg at 12/17/22 0901    rosuvastatin (CRESTOR) tablet 10 mg  10 mg Oral Daily Charito Figueroa MD   10 mg at 12/17/22 0901    pantoprazole (PROTONIX) tablet 40 mg  40 mg Oral QAM AC Charito Figueroa MD   40 mg at 12/17/22 0526    dextrose 5 % and 0.45 % sodium chloride infusion   IntraVENous Continuous Charito Figueroa MD 75 mL/hr at 12/17/22 1056 New Bag at 12/17/22 1056    potassium chloride 10 mEq/100 mL IVPB (Peripheral Line)  10 mEq IntraVENous PRN Charito Figueroa MD   Stopped at 12/15/22 1032    amitriptyline (ELAVIL) tablet 25 mg  25 mg Oral Nightly Kayley Kaur MD   25 mg at 12/16/22 2139    donepezil (ARICEPT) tablet 10 mg  10 mg Oral BID Kayley Kaur MD   10 mg at 12/17/22 0901    finasteride (PROSCAR) tablet 5 mg  5 mg Oral Daily Kayley Kaur MD   5 mg at 12/17/22 0901    magnesium oxide (MAG-OX) tablet 400 mg  400 mg Oral Daily Kayley Kaur MD   400 mg at 12/17/22 0901    melatonin tablet 9 mg  9 mg Oral Nightly Kayley Kaur MD   9 mg at 12/16/22 2138    nitroGLYCERIN (NITROSTAT) SL tablet 0.4 mg  0.4 mg SubLINGual Q5 Min PRN Kayley Kaur MD        pregabalin (LYRICA) capsule 100 mg  100 mg Oral BID Kayley Kaur MD   100 mg at 12/17/22 0902    sucralfate (CARAFATE) tablet 1 g  1 g Oral 4x Daily Kayley Kaur MD   1 g at 12/17/22 0901    enoxaparin (LOVENOX) injection 40 mg  40 mg SubCUTAneous Daily Kayley Kaur MD   40 mg at 12/17/22 0902    magnesium sulfate 1000 mg in dextrose 5% 100 mL IVPB  1,000 mg IntraVENous PRN Kayley Kaur MD        ondansetron (ZOFRAN-ODT) disintegrating tablet 4 mg  4 mg Oral Q8H PRN Kayley Kaur MD        Or    ondansetron [facility]) injection 4 mg  4 mg IntraVENous Q6H PRN Kayley Kaur MD   4 mg at 12/17/22 1048    polyethylene glycol (GLYCOLAX) packet 17 g  17 g Oral Daily PRN Kayley Kaur MD        acetaminophen (TYLENOL) tablet 650 mg  650 mg Oral Q6H PRN Kayley Kaur MD   650 mg at 12/16/22 1233    Or    acetaminophen (TYLENOL) suppository 650 mg  650 mg Rectal Q6H PRN Kayley Kaur MD           Allergies:  Ciprofloxacin, Codeine, Morphine, Pletal [cilostazol], Sulfamethoxazole-trimethoprim, and Pcn [penicillins]    REVIEW OF SYSTEMS:      Difficult to obtain from the patient. Discussion with the wife and daughter. No other complaints about from was mentioned above.     PHYSICAL EXAM:      /65   Pulse 72   Temp 98.4 °F (36.9 °C) (Oral)   Resp 18   Ht 5' 9\" (1.753 m)   Wt 159 lb (72.1 kg)   SpO2 92%   BMI 23.48 kg/m²    Temp (24hrs), Av.5 °F (36.9 °C), Min:97.5 °F (36.4 °C), Max:99.1 °F (37.3 °C)      General appearance - not in pain or distress. Looks cachectic. Mental status -patient is sleepy at the time of my exam.  Eyes - pupils equal and reactive, extraocular eye movements intact  Ears - bilateral TM's and external ear canals normal  Nose - normal and patent, no erythema, discharge or polyps  Mouth - mucous membranes moist, pharynx normal without lesions  Neck - supple, no significant adenopathy  Lymphatics - no palpable lymphadenopathy, no hepatosplenomegaly  Chest - clear to auscultation, no wheezes, rales or rhonchi, symmetric air entry  Heart - normal rate, regular rhythm, normal S1, S2, no murmurs, rubs, clicks or gallops  Abdomen -generalized abdominal tenderness. , no masses or organomegaly. Ascites. Neurological -no apparent focal neurological deficit  Musculoskeletal - no joint tenderness, deformity or swelling  Extremities - peripheral pulses normal, no pedal edema, no clubbing or cyanosis  Skin - normal coloration and turgor, no rashes, no suspicious skin lesions noted           DATA:      Labs:       CBC:   Recent Labs     22   WBC 10.8 11.4*   HGB 12.3* 13.0   HCT 39.8* 41.6    381     BMP:   Recent Labs     22    136   K 3.9 4.1   CO2 26 28   BUN 5* 6*   CREATININE 0.71 0.86   LABGLOM >60 >60   GLUCOSE 126* 128*     PT/INR: No results for input(s): PROTIME, INR in the last 72 hours. APTT:No results for input(s): APTT in the last 72 hours.   LIVER PROFILE:  Recent Labs     22   AST 22 23   ALT 12 11   LABALBU 2.4* 2.5*     MRI ABDOMEN W WO CONTRAST MRCP  Narrative: EXAMINATION:  MRI OF THE ABDOMEN WITH AND WITHOUT CONTRAST AND MRCP 2022 3:02 pm    TECHNIQUE:  Multiplanar multisequence MRI of the abdomen was performed without and with  the administration of 13 mL ProHance intravenous contrast. After initial T2  axial and coronal images, thick slab, thin slab and 3D coronal MRCP sequences  were obtained without the administration of intravenous contrast.  MIP images  are provided for review. COMPARISON:  CT on 12/09/2022    HISTORY:  Persistent abdominal pain and ascites, concern for malignancy. FINDINGS:  Image quality degraded by motion artifact. No hepatic steatosis. No liver lesion seen. Hepatic vasculature is patent. Mild-moderate ascites. Diffuse omental reticulation at the level of the  kidneys (series 1103 image 105) with associated mild enhancement. Gallbladder is unremarkable. No biliary or pancreatic ductal dilatation. Spleen size at the upper limit of normal.  Pancreas and adrenals are  unremarkable. Kidneys demonstrate symmetric enhancement without  hydronephrosis. A large simple cyst at the inferior pole of the left kidney  measures 9.2 x 7.9 cm. No significant lymphadenopathy. Mild bowel wall  thickening in the right abdomen as seen on recent CT. Extensive colonic  diverticulosis. Osseous structures are unremarkable. Atherosclerotic  disease of the abdominal aorta without aneurysm. Bilateral gynecomastia. Susceptibility artifact from sternotomy changes. Impression: 1. Diffuse omental reticulation with associated mild enhancement. While this  could relate to the presence of mild-moderate ascites, the possibility of  omental carcinomatosis cannot be excluded. Consider omental biopsy for  further evaluation. 2. No other findings to suggest malignancy within the abdomen.             IMPRESSION:    Primary Problem  Other ascites    Active Hospital Problems    Diagnosis Date Noted    Malignant neoplasm of lower third of esophagus ([address] [C15.5] 12/16/2022     Priority: Medium    Severe malnutrition ([address] [E43] 12/12/2022     Priority: Medium    Lower abdominal pain [R10.30] 12/10/2022     Priority: Medium    Diffuse abdominal pain [R10.84] 12/10/2022     Priority: Medium    Failure to thrive in adult [R62.7] 12/10/2022     Priority: Medium    Anorexia [R63.0] 12/10/2022     Priority: Medium    Other ascites [R18.8] 12/09/2022     Priority: Medium       RECOMMENDATIONS:  Records and labs and images were reviewed and discussed with the family. Patient was also evaluated by multiple other specialties. Appreciated. Pathology results from the biopsy done yesterday from the lower esophagus is positive for adenocarcinoma. So we are dealing with advanced metastatic lower esophageal cancer with diffuse metastasis to intra-abdominal organs. I explained to the patient and the multiple family members were at bedside today the nature of this cancer, staging, prognosis and treatment. Explained that we are dealing with stage IV metastatic esophageal cancer and treatment is palliative systemic chemoimmunotherapy. However considering patient's performance with underlying dementia and other health comorbidities, patient may have significant toxicity and further deterioration in quality of life. After lengthy discussion about his cancer and prognosis, patient and family decided to pursue hospice care. I believe this is the best option for him at the present time. Likely to have consult with hospice today. With increasing ascites, I think he will benefit for paracentesis and having catheter for home drainage. May be done on Monday. Patient and family questions were answered to the best of their satisfaction and they verbalized full understanding.               Oly Thomson MD, MD [facility]                            This note is created with the assistance of a speech recognition program.  While intending to generate a document that actually reflects the content of the visit, the document can still have some errors including those of syntax and sound a like substitutions which may escape proof reading. It such instances, actual meaning can be extrapolated by contextual diversion.

## 2022-12-17 NOTE — PROGRESS NOTES
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, new diagnosis  Lower abdominal pain  Unlikely spontaneous bacterial peritonitis  Questionable history of liver cirrhosis  Ascites  Failure to thrive  Diverticulosis  Coronary artery disease, native vessel  Status post CABG  Left bundle branch block  Benign prostatic hypertrophy  Mixed hyperlipidemia  Major depressive disorder  Dementia  Gastroesophageal reflux disease without esophagitis  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  Polio at age 10    Plan:      Patient is admitted to progressive unit  Oxygen to keep SPO2 more than 90%  Telemetry  Check vital signs closely  CBC BMP daily  Blood culture  Gastroenterology is involved, they are suspecting spontaneous bacterial peritonitis, patient is continued on IV antibiotics IV Rocephin, further they will follow-up fluid cultures from ascitic fluid and patient is status post paracentesis with 3 L removal fluid on 12/12/2022  Patient is status post EGD 12/15/2022, evidence of mass-effect and narrowing at GE junction and cardia multiple biopsies are taken which showed adenocarcinoma  Urology consult  Continue Plavix  Continue Crestor  Infectious disease have evaluated patient, they recommended patient likely does not have spontaneous bacterial peritonitis, for now they have patient completed IV Rocephin  Patient and the family would like to have discussion with hospice which likely happen on Monday  Continue other medication as below  Discussed with patient and family at bedside, patient CODE STATUS is changed to DNR CC      Scheduled Meds:   sodium chloride flush  5-40 mL IntraVENous 2 times per day    sodium chloride  50 mL IntraVENous Once    famotidine  40 mg Oral BID    rosuvastatin  10 mg Oral Daily    pantoprazole  40 mg Oral QAM AC    amitriptyline  25 mg Oral Nightly    donepezil  10 mg Oral BID    finasteride  5 mg Oral Daily    magnesium oxide  400 mg Oral Daily    melatonin  9 mg Oral Nightly    pregabalin  100 mg Oral BID    sucralfate  1 g Oral 4x Daily    enoxaparin  40 mg SubCUTAneous Daily     Continuous Infusions:   sodium chloride      dextrose 5 % and 0.45 % NaCl 75 mL/hr at 22 1400     PRN Meds:  fentanNYL, 25 mcg, Q4H PRN  metoprolol, 5 mg, Once PRN  sodium chloride flush, 5-40 mL, PRN  sodium chloride, , PRN  potassium chloride, 10 mEq, PRN  nitroGLYCERIN, 0.4 mg, Q5 Min PRN  magnesium sulfate, 1,000 mg, PRN  ondansetron, 4 mg, Q8H PRN   Or  ondansetron, 4 mg, Q6H PRN  polyethylene glycol, 17 g, Daily PRN  acetaminophen, 650 mg, Q6H PRN   Or  acetaminophen, 650 mg, Q6H PRN          Subjective:     Patient seen and examined at bedside and reviewed  last 24 hrs events with nursing staff  No acute events overnight. Patient denies any acute complaints. Afebrile  Patient denies any chest pain, shortness of Breath, palpitation, headache, dizziness, cough, nausea, vomiting, abdominal pain, pain, changes in urination or bowel habit or rash. Patient complains of abdominal pain      ROS:  A 10 point system reviewed and negative otherwise mentioned above.         Physical Examination:      Vitals:  /77   Pulse 51   Temp 97.9 °F (36.6 °C) (Oral)   Resp 20   Ht 5' 9\" (1.753 m)   Wt 159 lb (72.1 kg)   SpO2 94%   BMI 23.48 kg/m²   Temp (24hrs), Av.6 °F (37 °C), Min:97.9 °F (36.6 °C), Max:99.1 °F (37.3 °C)    Weight:   Wt Readings from Last 3 Encounters:   22 159 lb (72.1 kg)   10/11/21 155 lb (70.3 kg)   20 150 lb (68 kg)     I/O last 3 completed shifts:  I/O last 3 completed shifts:  In: -   Out: 2600 [Urine:2600]     No results for input(s): POCGLU in the last 72 hours. General appearance - alert, well appearing, and in no acute distress  Mental status - oriented to person, place, and time with normal affect  Head - normocephalic and atraumatic  Eyes - pupils equal and reactive, extraocular eye movements intact, conjunctiva clear  Ears - hearing appears to be intact  Nose - no drainage noted  Mouth - mucous membranes moist  Neck - supple, no carotid bruits, thyroid not palpable  Chest - clear to auscultation, normal effort  Heart - normal rate, regular rhythm, no murmur  Abdomen - soft, diffuse abdominal tenderness, distended, bowel sounds present all four quadrants, no masses, hepatomegaly or splenomegaly  Neurological - normal speech, no focal findings or movement disorder noted, cranial nerves II through XII grossly intact  Extremities - peripheral pulses palpable, no pedal edema or calf pain with palpation  Skin - no gross lesions, rashes, or induration noted        Medications: Allergies:    Allergies   Allergen Reactions    Ciprofloxacin Other (See Comments)     Nerve pain exacerbation in feet    Codeine Other (See Comments)     insomia & tachycardia    Morphine      Pt states he has problems with his bp when given morphine    Pletal [Cilostazol]     Sulfamethoxazole-Trimethoprim     Pcn [Penicillins] Rash       Current Meds:   Current Facility-Administered Medications:     fentaNYL (SUBLIMAZE) injection 25 mcg, 25 mcg, IntraVENous, Q4H PRN, Yulia Kimball MD, 25 mcg at 12/17/22 1115    metoprolol (LOPRESSOR) injection 5 mg, 5 mg, IntraVENous, Once PRN, Elijah Loya MD    sodium chloride flush 0.9 % injection 5-40 mL, 5-40 mL, IntraVENous, 2 times per day, Rene Ruelas MD, 10 mL at 12/17/22 0908    sodium chloride flush 0.9 % injection 5-40 mL, 5-40 mL, IntraVENous, PRN, Rene Ruelas MD    0.9 % sodium chloride infusion, , IntraVENous, PRN, Rene Ruelas MD 0.9 % sodium chloride bolus, 50 mL, IntraVENous, Once, Jaciel Mcdermott MD    famotidine (PEPCID) tablet 40 mg, 40 mg, Oral, BID, Jaciel Mcdermott MD, 40 mg at 12/17/22 0901    rosuvastatin (CRESTOR) tablet 10 mg, 10 mg, Oral, Daily, Jaciel Mcdermott MD, 10 mg at 12/17/22 0901    pantoprazole (PROTONIX) tablet 40 mg, 40 mg, Oral, QAM AC, Jaciel Mcdermott MD, 40 mg at 12/17/22 0526    dextrose 5 % and 0.45 % sodium chloride infusion, , IntraVENous, Continuous, Jaciel Mcdermott MD, Last Rate: 75 mL/hr at 12/17/22 1400, Rate Verify at 12/17/22 1400    potassium chloride 10 mEq/100 mL IVPB (Peripheral Line), 10 mEq, IntraVENous, PRN, Jaciel Mcdermott MD, Stopped at 12/15/22 1032    amitriptyline (ELAVIL) tablet 25 mg, 25 mg, Oral, Nightly, Jaciel Mcdermott MD, 25 mg at 12/16/22 2139    donepezil (ARICEPT) tablet 10 mg, 10 mg, Oral, BID, Jaciel Mcdermott MD, 10 mg at 12/17/22 0901    finasteride (PROSCAR) tablet 5 mg, 5 mg, Oral, Daily, Jaciel Mcdermott MD, 5 mg at 12/17/22 0901    magnesium oxide (MAG-OX) tablet 400 mg, 400 mg, Oral, Daily, Jaciel Mcdermott MD, 400 mg at 12/17/22 0901    melatonin tablet 9 mg, 9 mg, Oral, Nightly, Jaciel Mcdermott MD, 9 mg at 12/16/22 2138    nitroGLYCERIN (NITROSTAT) SL tablet 0.4 mg, 0.4 mg, SubLINGual, Q5 Min PRN, Jaciel Mcdermott MD    pregabalin (LYRICA) capsule 100 mg, 100 mg, Oral, BID, Jaciel Mcdermott MD, 100 mg at 12/17/22 0902    sucralfate (CARAFATE) tablet 1 g, 1 g, Oral, 4x Daily, Jaciel Mcdermott MD, 1 g at 12/17/22 1205    enoxaparin (LOVENOX) injection 40 mg, 40 mg, SubCUTAneous, Daily, Jaciel Mcdermott MD, 40 mg at 12/17/22 0902    magnesium sulfate 1000 mg in dextrose 5% 100 mL IVPB, 1,000 mg, IntraVENous, PRN, Jaciel Mcdermott MD    ondansetron (ZOFRAN-ODT) disintegrating tablet 4 mg, 4 mg, Oral, Q8H PRN **OR** ondansetron (ZOFRAN) injection 4 mg, 4 mg, IntraVENous, Q6H PRN, Jaciel Mcdermott MD, 4 mg at 12/17/22 1048    polyethylene glycol (GLYCOLAX) packet 17 g, 17 g, Oral, Daily PRN, Jaciel Mcdermott, MD    acetaminophen (TYLENOL) tablet 650 mg, 650 mg, Oral, Q6H PRN, 650 mg at 12/16/22 1233 **OR** acetaminophen (TYLENOL) suppository 650 mg, 650 mg, Rectal, Q6H PRN, Jaciel Mcdermott MD      I/O (24Hr): Intake/Output Summary (Last 24 hours) at 12/17/2022 1530  Last data filed at 12/17/2022 1400  Gross per 24 hour   Intake 43736.84 ml   Output 2125 ml   Net 8610.84 ml         Data:           Labs:    Hematology:  Recent Labs     12/15/22  0514 12/16/22  0519 12/17/22  0517   WBC 9.9 10.8 11.4*   RBC 4.32 4.49 4.73   HGB 11.9* 12.3* 13.0   HCT 39.1* 39.8* 41.6   MCV 90.5 88.6 87.9   MCH 27.5 27.4 27.5   MCHC 30.4 30.9 31.3   RDW 13.4 13.5 13.5    364 381   MPV 10.7 10.7 10.3       Chemistry:  Recent Labs     12/14/22  1615 12/15/22  0514 12/16/22  0519 12/17/22  0517   NA  --  135 137 136   K  --  3.4* 3.9 4.1   CL  --  101 104 99   CO2  --  26 26 28   GLUCOSE  --  120* 126* 128*   BUN  --  3* 5* 6*   CREATININE  --  0.62* 0.71 0.86   MG  --  1.7  --   --    ANIONGAP  --  8* 7* 9   LABGLOM  --  >60 >60 >60   CALCIUM  --  8.1* 8.3* 8.4*   PSA 0.70  --   --   --        Recent Labs     12/15/22  0514 12/16/22 0519 12/17/22  0517   PROT 5.2* 5.4* 5.7*   LABALBU 2.3* 2.4* 2.5*   AST 27 22 23   ALT 14 12 11   ALKPHOS 81 87 94   BILITOT 0.3 0.3 0.3         Lab Results   Component Value Date/Time    SPECIAL NOT REPORTED 02/18/2020 10:58 PM     Lab Results   Component Value Date/Time    CULTURE NO GROWTH 4 DAYS 12/12/2022 02:00 PM       No results found for: POCPH, PHART, PH, POCPCO2, IZK0EHX, PCO2, POCPO2, PO2ART, PO2, POCHCO3, EXS6IHP, HCO3, NBEA, PBEA, BEART, BE, THGBART, THB, MAW4ENI, QKDC4SJW, T2JBSCUD, O2SAT, FIO2    Radiology:    CT ABDOMEN PELVIS W IV CONTRAST Additional Contrast? None    Result Date: 12/9/2022  Moderate ascites with associated moderate diffuse edema within the mesentery. Severe diverticulosis involving the left colon without evidence of acute diverticulitis. No abscess or perforation. Numerous bladder diverticula. Laminectomy at L4 and L5. Bilateral gynecomastia. FL MODIFIED BARIUM SWALLOW W VIDEO    Result Date: 12/10/2022  1. 1 instance of flash penetration without aspiration with the pureed/pudding thick substance. 2. No penetration or aspiration with the remainder of the administered substances/administrations. Please see separate speech pathology report for full discussion of findings and recommendations. All radiological studies reviewed  Code Status:  Full Code        Electronically signed by Flores Riley MD on 12/17/2022 at 3:30 PM    This note was created with the assistance of a speech-recognition program.  Although the intention is to generate a document that actually reflects the content of the visit, no guarantees can be provided that every mistake has been identified and corrected by editing. Note was updated later by me after  physical examination and  completion of the assessment.

## 2022-12-17 NOTE — PROGRESS NOTES
_                         Today's Date: 12/16/2022  Patient Name: Elizabeth Gonzalez  Date of admission: 12/9/2022  5:11 PM  Patient's age: 81 y.o., 1940  Admission Dx: Diverticulosis of colon [K57.30]  Other ascites [R18.8]  Lower abdominal pain [R10.30]  Failure to thrive in adult [R62.7]  Ascites of liver [R18.8]      Requesting Physician: Faby Loaiza MD    CHIEF COMPLAINT: Abdominal pain. Weight loss. Ascites. SUBJECTIVE:  . Patient was seen and examined . Clinically stable. No events overnight. Family at bedside. Continues to have abdominal discomfort. Mental status with no changes. No fever. No GI bleeding. BRIEF CASE HISTORY:    The patient is a 81 y.o.  male who is admitted to the hospital for further management of increasing abdominal pain and failure to thrive. Patient is unable to give any history. History is obtained from the chart and talking to the wife and daughter. Patient presented to [facility] due to worsening abdominal pain and abdominal distention since October. He had weight loss and decreased appetite. Patient had recurrent episodes of vomiting yellow mucus and multiple episodes of diarrhea. He had no fever. He had abdominal CT scan which showed ascites. There was no perforation. No bowel obstruction. Patient had evaluation by ID and by gastroenterology. He will have EGD tomorrow. Last colonoscopy was 2017. Patient had multiple comorbidities including coronary artery disease and benign prostate hypertrophy as well as hyperlipidemia. And history of Alzheimer disease.     Past Medical History:   has a past medical history of Bladder stone, CAD (coronary artery disease), Diverticulitis, GERD (gastroesophageal reflux disease), Hyperlipidemia, Hypertension, Intermittent self-catheterization of bladder, LBBB (left bundle branch block), MI (myocardial infarction) ([facility] [address] Neuropathy, Polio, and Wears glasses. Past Surgical History:   has a past surgical history that includes Coronary artery bypass graft (2000); Coronary angioplasty with stent; Cardiac surgery (2000); Colonoscopy; Endoscopy, colon, diagnostic; Bladder stone removal; Esophagogastroduodenoscopy (12/15/2022); and Upper gastrointestinal endoscopy (N/A, 12/15/2022). Family History: family history is not on file. Social History:   reports that he has never smoked. He has never used smokeless tobacco. He reports that he does not drink alcohol and does not use drugs. Medications:    Prior to Admission medications    Medication Sig Start Date End Date Taking? Authorizing Provider   cefdinir (OMNICEF) 300 MG capsule Take 300 mg by mouth 2 times daily 14-day course filled 12/8/22   Yes Historical Provider, MD   dicyclomine (BENTYL) 10 MG capsule Take 10 mg by mouth 2 times daily as needed   Yes Historical Provider, MD   omeprazole (PRILOSEC) 40 MG delayed release capsule Take 40 mg by mouth daily   Yes Historical Provider, MD   metroNIDAZOLE (FLAGYL) 500 MG tablet Take 500 mg by mouth 2 times daily 24-day course filled 12/1/22   Yes Historical Provider, MD   pregabalin (LYRICA) 100 MG capsule Take 100 mg by mouth 2 times daily.     Historical Provider, MD   finasteride (PROSCAR) 5 MG tablet Take 5 mg by mouth daily    Historical Provider, MD   donepezil (ARICEPT) 10 MG tablet Take 10 mg by mouth 2 times daily    Historical Provider, MD   amitriptyline (ELAVIL) 25 MG tablet Take 25-50 mg by mouth nightly    Historical Provider, MD   rosuvastatin (CRESTOR) 10 MG tablet Take 10 mg by mouth daily    Historical Provider, MD   ondansetron (ZOFRAN) 4 MG tablet Take 4 mg by mouth every 8 hours as needed for Nausea or Vomiting    Historical Provider, MD   Melatonin 10 MG CAPS Take 10-20 mg by mouth nightly    Historical Provider, MD   famotidine (PEPCID) 40 MG tablet Take 40 mg by mouth 2 times daily    Historical Provider, MD   acetaminophen (TYLENOL) 500 MG tablet Take 500 mg by mouth every 6 hours as needed prn    Historical Provider, MD   Magnesium 400 MG TABS Take 400 mg by mouth Daily    Historical Provider, MD   vitamin D-3 (CHOLECALCIFEROL) 125 MCG (5000 UT) TABS Take 5,000 Units by mouth daily    Historical Provider, MD   Cyanocobalamin 2500 MCG TABS Take 5,000 mcg by mouth Daily    Historical Provider, MD   nitroGLYCERIN (NITROSTAT) 0.4 MG SL tablet Place 0.4 mg under the tongue every 5 minutes as needed for Chest pain up to max of 3 total doses. If no relief after 1 dose, call 911.  Using as needed but has not needed them    Historical Provider, MD   clopidogrel (PLAVIX) 75 MG tablet Take 75 mg by mouth daily    Historical Provider, MD   Diphenhydramine-APAP, sleep, (TYLENOL PM EXTRA STRENGTH PO) Take 1 tablet by mouth nightly as needed    Historical Provider, MD     Current Facility-Administered Medications   Medication Dose Route Frequency Provider Last Rate Last Admin    sodium chloride flush 0.9 % injection 5-40 mL  5-40 mL IntraVENous 2 times per day Dorothy Stark MD        sodium chloride flush 0.9 % injection 5-40 mL  5-40 mL IntraVENous PRN Dorothy Stark MD        0.9 % sodium chloride infusion   IntraVENous PRN Dorothy Stark MD        0.9 % sodium chloride bolus  50 mL IntraVENous Once Kathleen Conti MD        famotidine (PEPCID) tablet 40 mg  40 mg Oral BID Kathleen Conti MD   40 mg at 12/16/22 1023    rosuvastatin (CRESTOR) tablet 10 mg  10 mg Oral Daily Kathleen Conti MD   10 mg at 12/16/22 1023    pantoprazole (PROTONIX) tablet 40 mg  40 mg Oral QAM AC Kathleen Conti MD   40 mg at 12/16/22 0529    dextrose 5 % and 0.45 % sodium chloride infusion   IntraVENous Continuous Kathleen Conti MD 75 mL/hr at 12/16/22 1033 New Bag at 12/16/22 1033    potassium chloride 10 mEq/100 mL IVPB (Peripheral Line)  10 mEq IntraVENous PRN Kathleen Conti  mL/hr at 12/15/22 0932 10 mEq at 12/15/22 0932    amitriptyline (ELAVIL) tablet 25 mg  25 mg Oral Nightly Alec Waldrop MD   25 mg at 12/15/22 1936    donepezil (ARICEPT) tablet 10 mg  10 mg Oral BID Alec Waldrop MD   10 mg at 22 1023    finasteride (PROSCAR) tablet 5 mg  5 mg Oral Daily Alec Waldrop MD   5 mg at 22 1023    magnesium oxide (MAG-OX) tablet 400 mg  400 mg Oral Daily Alec Waldrop MD   400 mg at 22 1029    melatonin tablet 9 mg  9 mg Oral Nightly Alec Waldrop MD   9 mg at 12/15/22 193    nitroGLYCERIN (NITROSTAT) SL tablet 0.4 mg  0.4 mg SubLINGual Q5 Min PRN Alec Waldrop MD        pregabalin (LYRICA) capsule 100 mg  100 mg Oral BID Alec Waldrop MD   100 mg at 22 1023    sucralfate (CARAFATE) tablet 1 g  1 g Oral 4x Daily Alec Waldrop MD   1 g at 22 1750    enoxaparin (LOVENOX) injection 40 mg  40 mg SubCUTAneous Daily Alec Waldrop MD   40 mg at 22 1022    magnesium sulfate 1000 mg in dextrose 5% 100 mL IVPB  1,000 mg IntraVENous PRN Alec Waldrop MD        ondansetron (ZOFRAN-ODT) disintegrating tablet 4 mg  4 mg Oral Q8H PRN Alec Waldrop MD        Or    [facility]ron [facility]) injection 4 mg  4 mg IntraVENous Q6H PRN Alec Waldrop MD   4 mg at 22 0824    polyethylene glycol (GLYCOLAX) packet 17 g  17 g Oral Daily PRN Alec Waldrop MD        acetaminophen (TYLENOL) tablet 650 mg  650 mg Oral Q6H PRN Alec Waldrop MD   650 mg at 22 1233    Or    acetaminophen (TYLENOL) suppository 650 mg  650 mg Rectal Q6H PRN Alec Waldrop MD           Allergies:  Ciprofloxacin, Codeine, Morphine, Pletal [cilostazol], Sulfamethoxazole-trimethoprim, and Pcn [penicillins]    REVIEW OF SYSTEMS:      Difficult to obtain from the patient. Discussion with the wife and daughter. No other complaints about from was mentioned above.     PHYSICAL EXAM:      /74   Pulse (!) 103   Temp 99 °F (37.2 °C) (Oral)   Resp 16   Ht 5' 9\" (1.753 m)   Wt 159 lb (72.1 kg)   SpO2 93%   BMI 23.48 kg/m²    Temp (24hrs), Av.3 °F (36.8 °C), Min:97.5 °F (36.4 °C), Max:99 °F (37.2 °C)      General appearance - not in pain or distress. Looks cachectic. Mental status -patient is sleepy at the time of my exam.  Eyes - pupils equal and reactive, extraocular eye movements intact  Ears - bilateral TM's and external ear canals normal  Nose - normal and patent, no erythema, discharge or polyps  Mouth - mucous membranes moist, pharynx normal without lesions  Neck - supple, no significant adenopathy  Lymphatics - no palpable lymphadenopathy, no hepatosplenomegaly  Chest - clear to auscultation, no wheezes, rales or rhonchi, symmetric air entry  Heart - normal rate, regular rhythm, normal S1, S2, no murmurs, rubs, clicks or gallops  Abdomen -generalized abdominal tenderness. , no masses or organomegaly  Neurological -no apparent focal neurological deficit  Musculoskeletal - no joint tenderness, deformity or swelling  Extremities - peripheral pulses normal, no pedal edema, no clubbing or cyanosis  Skin - normal coloration and turgor, no rashes, no suspicious skin lesions noted           DATA:      Labs:       CBC:   Recent Labs     12/15/22  0514 12/16/22  0519   WBC 9.9 10.8   HGB 11.9* 12.3*   HCT 39.1* 39.8*    364     BMP:   Recent Labs     12/15/22  0514 12/16/22  0519    137   K 3.4* 3.9   CO2 26 26   BUN 3* 5*   CREATININE 0.62* 0.71   LABGLOM >60 >60   GLUCOSE 120* 126*     PT/INR: No results for input(s): PROTIME, INR in the last 72 hours. APTT:No results for input(s): APTT in the last 72 hours.   LIVER PROFILE:  Recent Labs     12/15/22  0514 12/16/22  0519   AST 27 22   ALT 14 12   LABALBU 2.3* 2.4*     MRI ABDOMEN W WO CONTRAST MRCP  Narrative: EXAMINATION:  MRI OF THE ABDOMEN WITH AND WITHOUT CONTRAST AND MRCP 12/14/2022 3:02 pm    TECHNIQUE:  Multiplanar multisequence MRI of the abdomen was performed without and with  the administration of 13 mL ProHance intravenous contrast.  After initial T2  axial and coronal images, thick slab, thin slab and 3D coronal MRCP sequences  were obtained without the administration of intravenous contrast.  MIP images  are provided for review. COMPARISON:  CT on 12/09/2022    HISTORY:  Persistent abdominal pain and ascites, concern for malignancy. FINDINGS:  Image quality degraded by motion artifact. No hepatic steatosis. No liver lesion seen. Hepatic vasculature is patent. Mild-moderate ascites. Diffuse omental reticulation at the level of the  kidneys (series 1103 image 105) with associated mild enhancement. Gallbladder is unremarkable. No biliary or pancreatic ductal dilatation. Spleen size at the upper limit of normal.  Pancreas and adrenals are  unremarkable. Kidneys demonstrate symmetric enhancement without  hydronephrosis. A large simple cyst at the inferior pole of the left kidney  measures 9.2 x 7.9 cm. No significant lymphadenopathy. Mild bowel wall  thickening in the right abdomen as seen on recent CT. Extensive colonic  diverticulosis. Osseous structures are unremarkable. Atherosclerotic  disease of the abdominal aorta without aneurysm. Bilateral gynecomastia. Susceptibility artifact from sternotomy changes. Impression: 1. Diffuse omental reticulation with associated mild enhancement. While this  could relate to the presence of mild-moderate ascites, the possibility of  omental carcinomatosis cannot be excluded. Consider omental biopsy for  further evaluation. 2. No other findings to suggest malignancy within the abdomen.             IMPRESSION:    Primary Problem  Other ascites    Active Hospital Problems    Diagnosis Date Noted    Severe malnutrition ([facility] [address] [E43] 12/12/2022     Priority: Medium    Lower abdominal pain [R10.30] 12/10/2022     Priority: Medium    Diffuse abdominal pain [R10.84] 12/10/2022     Priority: Medium    Failure to thrive in adult [R62.7] 12/10/2022     Priority: Medium    Anorexia [R63.0] 12/10/2022     Priority: Medium    Other ascites [R18.8] 12/09/2022 Priority: Medium       RECOMMENDATIONS:  Records and labs and images were reviewed and discussed with the family. Patient was also evaluated by multiple other specialties. Appreciated. Pathology results from the biopsy done yesterday from the lower esophagus is positive for adenocarcinoma. So we are dealing with advanced metastatic lower esophageal cancer with diffuse metastasis to intra-abdominal organs. I explained to the patient and the multiple family members were at bedside today the nature of this cancer, staging, prognosis and treatment. Explained that we are dealing with stage IV metastatic esophageal cancer and treatment is palliative systemic chemoimmunotherapy. However considering patient's performance with underlying dementia and other health comorbidities, patient may have significant toxicity and further deterioration in quality of life. After lengthy discussion about his cancer and prognosis, patient and family decided to pursue hospice care. I believe this is the best option for him at the present time. Patient and family questions were answered to the best of their satisfaction and they verbalized full understanding. We will consult hospice facility of their choice. Balaji Shah MD, MD                            [facility]                            This note is created with the assistance of a speech recognition program.  While intending to generate a document that actually reflects the content of the visit, the document can still have some errors including those of syntax and sound a like substitutions which may escape proof reading. It such instances, actual meaning can be extrapolated by contextual diversion.

## 2022-12-17 NOTE — CARE COORDINATION
Social work: spoke to whole family all are in agreement with staying here due to procedure Monday and they will meet with pt and family at 1 pm Monday with Joceline the room. Hospice has wife and daughter on face sheet at primary contacts. Marleen is the Rn who is scheduled for the meeting here on Monday. Will advise yogi knowles after meeting if pt is no longer going there. Will need ambulance [place]  Lexii honeycutt

## 2022-12-17 NOTE — PLAN OF CARE
Problem: Discharge Planning  Goal: Discharge to home or other facility with appropriate resources  Outcome: Progressing  Flowsheets (Taken 12/16/2022 2000)  Discharge to home or other facility with appropriate resources:   Identify barriers to discharge with patient and caregiver   Arrange for needed discharge resources and transportation as appropriate   Identify discharge learning needs (meds, wound care, etc)     Problem: Skin/Tissue Integrity  Goal: Absence of new skin breakdown  Description: 1. Monitor for areas of redness and/or skin breakdown  2. Assess vascular access sites hourly  3. Every 4-6 hours minimum:  Change oxygen saturation probe site  4. Every 4-6 hours:  If on nasal continuous positive airway pressure, respiratory therapy assess nares and determine need for appliance change or resting period.   Outcome: Progressing     Problem: Safety - Adult  Goal: Free from fall injury  Outcome: Progressing     Problem: ABCDS Injury Assessment  Goal: Absence of physical injury  Outcome: Progressing     Problem: Genitourinary - Adult  Goal: Absence of urinary retention  Outcome: Progressing  Flowsheets (Taken 12/16/2022 2000)  Absence of urinary retention:   Assess patients ability to void and empty bladder   Monitor intake/output and perform bladder scan as needed  Goal: Urinary catheter remains patent  Outcome: Progressing  Flowsheets (Taken 12/16/2022 2000)  Urinary catheter remains patent: Assess patency of urinary catheter     Problem: Nutrition Deficit:  Goal: Optimize nutritional status  Outcome: Progressing     Problem: Pain  Goal: Verbalizes/displays adequate comfort level or baseline comfort level  Outcome: Progressing

## 2022-12-18 LAB
ABSOLUTE EOS #: 0.13 K/UL (ref 0–0.44)
ABSOLUTE IMMATURE GRANULOCYTE: 0.13 K/UL (ref 0–0.3)
ABSOLUTE LYMPH #: 0.9 K/UL (ref 1.1–3.7)
ABSOLUTE MONO #: 1.68 K/UL (ref 0.1–1.2)
ALBUMIN SERPL-MCNC: 2.4 G/DL (ref 3.5–5.2)
ALP BLD-CCNC: 88 U/L (ref 40–129)
ALT SERPL-CCNC: 13 U/L (ref 5–41)
ANION GAP SERPL CALCULATED.3IONS-SCNC: 9 MMOL/L (ref 9–17)
AST SERPL-CCNC: 20 U/L
BASOPHILS # BLD: 0 % (ref 0–2)
BASOPHILS ABSOLUTE: 0 K/UL (ref 0–0.2)
BILIRUB SERPL-MCNC: 0.3 MG/DL (ref 0.3–1.2)
BUN BLDV-MCNC: 5 MG/DL (ref 8–23)
BUN/CREAT BLD: 8 (ref 9–20)
CALCIUM SERPL-MCNC: 8.1 MG/DL (ref 8.6–10.4)
CHLORIDE BLD-SCNC: 101 MMOL/L (ref 98–107)
CO2: 26 MMOL/L (ref 20–31)
CREAT SERPL-MCNC: 0.62 MG/DL (ref 0.7–1.2)
CULTURE: ABNORMAL
DIRECT EXAM: ABNORMAL
DIRECT EXAM: ABNORMAL
EOSINOPHILS RELATIVE PERCENT: 1 % (ref 1–4)
GFR SERPL CREATININE-BSD FRML MDRD: >60 ML/MIN/1.73M2
GLUCOSE BLD-MCNC: 121 MG/DL (ref 70–99)
HCT VFR BLD CALC: 38.4 % (ref 40.7–50.3)
HEMOGLOBIN: 12.4 G/DL (ref 13–17)
IMMATURE GRANULOCYTES: 1 %
LYMPHOCYTES # BLD: 7 % (ref 24–43)
MAGNESIUM: 1.6 MG/DL (ref 1.6–2.6)
MCH RBC QN AUTO: 27.9 PG (ref 25.2–33.5)
MCHC RBC AUTO-ENTMCNC: 32.3 G/DL (ref 28.4–34.8)
MCV RBC AUTO: 86.5 FL (ref 82.6–102.9)
MONOCYTES # BLD: 13 % (ref 3–12)
NRBC AUTOMATED: 0 PER 100 WBC
PDW BLD-RTO: 13.5 % (ref 11.8–14.4)
PLATELET # BLD: 363 K/UL (ref 138–453)
PMV BLD AUTO: 10.5 FL (ref 8.1–13.5)
POTASSIUM SERPL-SCNC: 3.3 MMOL/L (ref 3.7–5.3)
POTASSIUM SERPL-SCNC: 3.6 MMOL/L (ref 3.7–5.3)
RBC # BLD: 4.44 M/UL (ref 4.21–5.77)
SEG NEUTROPHILS: 78 % (ref 36–65)
SEGMENTED NEUTROPHILS ABSOLUTE COUNT: 10.06 K/UL (ref 1.5–8.1)
SODIUM BLD-SCNC: 136 MMOL/L (ref 135–144)
SPECIMEN DESCRIPTION: ABNORMAL
TOTAL PROTEIN: 5.3 G/DL (ref 6.4–8.3)
WBC # BLD: 12.9 K/UL (ref 3.5–11.3)

## 2022-12-18 PROCEDURE — 99232 SBSQ HOSP IP/OBS MODERATE 35: CPT | Performed by: INTERNAL MEDICINE

## 2022-12-18 PROCEDURE — 6360000002 HC RX W HCPCS: Performed by: FAMILY MEDICINE

## 2022-12-18 PROCEDURE — 85025 COMPLETE CBC W/AUTO DIFF WBC: CPT

## 2022-12-18 PROCEDURE — 6360000002 HC RX W HCPCS: Performed by: INTERNAL MEDICINE

## 2022-12-18 PROCEDURE — 2580000003 HC RX 258: Performed by: INTERNAL MEDICINE

## 2022-12-18 PROCEDURE — 83735 ASSAY OF MAGNESIUM: CPT

## 2022-12-18 PROCEDURE — 6370000000 HC RX 637 (ALT 250 FOR IP): Performed by: INTERNAL MEDICINE

## 2022-12-18 PROCEDURE — 84132 ASSAY OF SERUM POTASSIUM: CPT

## 2022-12-18 PROCEDURE — 80053 COMPREHEN METABOLIC PANEL: CPT

## 2022-12-18 PROCEDURE — 2580000003 HC RX 258: Performed by: STUDENT IN AN ORGANIZED HEALTH CARE EDUCATION/TRAINING PROGRAM

## 2022-12-18 PROCEDURE — 36415 COLL VENOUS BLD VENIPUNCTURE: CPT

## 2022-12-18 PROCEDURE — 2060000000 HC ICU INTERMEDIATE R&B

## 2022-12-18 RX ADMIN — SUCRALFATE 1 G: 1 TABLET ORAL at 16:03

## 2022-12-18 RX ADMIN — FENTANYL CITRATE 25 MCG: 0.05 INJECTION, SOLUTION INTRAMUSCULAR; INTRAVENOUS at 23:06

## 2022-12-18 RX ADMIN — SUCRALFATE 1 G: 1 TABLET ORAL at 20:43

## 2022-12-18 RX ADMIN — MAGNESIUM SULFATE HEPTAHYDRATE 1000 MG: 1 INJECTION, SOLUTION INTRAVENOUS at 10:42

## 2022-12-18 RX ADMIN — PREGABALIN 100 MG: 100 CAPSULE ORAL at 20:43

## 2022-12-18 RX ADMIN — DONEPEZIL HYDROCHLORIDE 10 MG: 10 TABLET, FILM COATED ORAL at 09:35

## 2022-12-18 RX ADMIN — DEXTROSE AND SODIUM CHLORIDE: 5; 450 INJECTION, SOLUTION INTRAVENOUS at 01:22

## 2022-12-18 RX ADMIN — MAGNESIUM SULFATE HEPTAHYDRATE 1000 MG: 1 INJECTION, SOLUTION INTRAVENOUS at 09:33

## 2022-12-18 RX ADMIN — PANTOPRAZOLE SODIUM 40 MG: 40 TABLET, DELAYED RELEASE ORAL at 06:15

## 2022-12-18 RX ADMIN — DONEPEZIL HYDROCHLORIDE 10 MG: 10 TABLET, FILM COATED ORAL at 20:43

## 2022-12-18 RX ADMIN — Medication 9 MG: at 20:43

## 2022-12-18 RX ADMIN — SODIUM CHLORIDE, PRESERVATIVE FREE 10 ML: 5 INJECTION INTRAVENOUS at 23:07

## 2022-12-18 RX ADMIN — POTASSIUM CHLORIDE 10 MEQ: 7.46 INJECTION, SOLUTION INTRAVENOUS at 09:30

## 2022-12-18 RX ADMIN — PREGABALIN 100 MG: 100 CAPSULE ORAL at 09:34

## 2022-12-18 RX ADMIN — ENOXAPARIN SODIUM 40 MG: 100 INJECTION SUBCUTANEOUS at 09:42

## 2022-12-18 RX ADMIN — Medication 400 MG: at 09:41

## 2022-12-18 RX ADMIN — FAMOTIDINE 40 MG: 20 TABLET, FILM COATED ORAL at 20:17

## 2022-12-18 RX ADMIN — SUCRALFATE 1 G: 1 TABLET ORAL at 14:17

## 2022-12-18 RX ADMIN — SUCRALFATE 1 G: 1 TABLET ORAL at 09:38

## 2022-12-18 RX ADMIN — POTASSIUM CHLORIDE 10 MEQ: 7.46 INJECTION, SOLUTION INTRAVENOUS at 11:43

## 2022-12-18 RX ADMIN — POTASSIUM CHLORIDE 10 MEQ: 7.46 INJECTION, SOLUTION INTRAVENOUS at 10:41

## 2022-12-18 RX ADMIN — POTASSIUM CHLORIDE 10 MEQ: 7.46 INJECTION, SOLUTION INTRAVENOUS at 13:15

## 2022-12-18 RX ADMIN — FAMOTIDINE 40 MG: 20 TABLET, FILM COATED ORAL at 09:37

## 2022-12-18 RX ADMIN — FENTANYL CITRATE 25 MCG: 0.05 INJECTION, SOLUTION INTRAMUSCULAR; INTRAVENOUS at 16:01

## 2022-12-18 RX ADMIN — AMITRIPTYLINE HYDROCHLORIDE 25 MG: 25 TABLET, FILM COATED ORAL at 20:43

## 2022-12-18 RX ADMIN — FENTANYL CITRATE 25 MCG: 0.05 INJECTION, SOLUTION INTRAMUSCULAR; INTRAVENOUS at 09:56

## 2022-12-18 RX ADMIN — DEXTROSE AND SODIUM CHLORIDE: 5; 450 INJECTION, SOLUTION INTRAVENOUS at 11:41

## 2022-12-18 ASSESSMENT — PAIN DESCRIPTION - LOCATION
LOCATION: FOOT
LOCATION: BACK;TOE (COMMENT WHICH ONE)
LOCATION: LEG

## 2022-12-18 ASSESSMENT — PAIN DESCRIPTION - ORIENTATION
ORIENTATION: LOWER
ORIENTATION: RIGHT;LEFT

## 2022-12-18 ASSESSMENT — PAIN DESCRIPTION - PAIN TYPE
TYPE: CHRONIC PAIN
TYPE: CHRONIC PAIN

## 2022-12-18 ASSESSMENT — PAIN - FUNCTIONAL ASSESSMENT
PAIN_FUNCTIONAL_ASSESSMENT: PREVENTS OR INTERFERES SOME ACTIVE ACTIVITIES AND ADLS
PAIN_FUNCTIONAL_ASSESSMENT: ACTIVITIES ARE NOT PREVENTED

## 2022-12-18 ASSESSMENT — PAIN DESCRIPTION - ONSET
ONSET: ON-GOING
ONSET: ON-GOING

## 2022-12-18 ASSESSMENT — PAIN SCALES - GENERAL
PAINLEVEL_OUTOF10: 9
PAINLEVEL_OUTOF10: 7
PAINLEVEL_OUTOF10: 6

## 2022-12-18 ASSESSMENT — PAIN DESCRIPTION - FREQUENCY
FREQUENCY: CONTINUOUS
FREQUENCY: CONTINUOUS

## 2022-12-18 ASSESSMENT — PAIN DESCRIPTION - DESCRIPTORS
DESCRIPTORS: THROBBING
DESCRIPTORS: THROBBING

## 2022-12-18 NOTE — PLAN OF CARE
Problem: Discharge Planning  Goal: Discharge to home or other facility with appropriate resources  Outcome: Progressing  Flowsheets (Taken 12/17/2022 2000)  Discharge to home or other facility with appropriate resources:   Identify barriers to discharge with patient and caregiver   Arrange for needed discharge resources and transportation as appropriate   Identify discharge learning needs (meds, wound care, etc)     Problem: Skin/Tissue Integrity  Goal: Absence of new skin breakdown  Description: 1. Monitor for areas of redness and/or skin breakdown  2. Assess vascular access sites hourly  3. Every 4-6 hours minimum:  Change oxygen saturation probe site  4. Every 4-6 hours:  If on nasal continuous positive airway pressure, respiratory therapy assess nares and determine need for appliance change or resting period.   Outcome: Progressing     Problem: Safety - Adult  Goal: Free from fall injury  Outcome: Progressing     Problem: ABCDS Injury Assessment  Goal: Absence of physical injury  Outcome: Progressing     Problem: Genitourinary - Adult  Goal: Absence of urinary retention  Outcome: Progressing  Flowsheets (Taken 12/17/2022 2000)  Absence of urinary retention:   Assess patients ability to void and empty bladder   Monitor intake/output and perform bladder scan as needed  Goal: Urinary catheter remains patent  Outcome: Progressing     Problem: Nutrition Deficit:  Goal: Optimize nutritional status  Outcome: Progressing     Problem: Pain  Goal: Verbalizes/displays adequate comfort level or baseline comfort level  Outcome: Progressing

## 2022-12-18 NOTE — PROGRESS NOTES
Pt remained calm throughout shift. Pt received fentanyl for pain in legs and feet earlier in the shift before 9pm. Family member remained with patient throughout the night at bedside. Albright draining. Hospice to discuss plan with family today.  Family leaning towards at-home hospice

## 2022-12-18 NOTE — PLAN OF CARE
Problem: Discharge Planning  Goal: Discharge to home or other facility with appropriate resources  12/18/2022 1717 by Sen Cavazos RN  Outcome: Progressing  Flowsheets (Taken 12/18/2022 0930)  Discharge to home or other facility with appropriate resources:   Identify barriers to discharge with patient and caregiver   Arrange for needed discharge resources and transportation as appropriate  12/18/2022 0318 by Greg Bradshaw RN  Outcome: Progressing  Flowsheets (Taken 12/17/2022 2000)  Discharge to home or other facility with appropriate resources:   Identify barriers to discharge with patient and caregiver   Arrange for needed discharge resources and transportation as appropriate   Identify discharge learning needs (meds, wound care, etc)     Problem: Skin/Tissue Integrity  Goal: Absence of new skin breakdown  Description: 1. Monitor for areas of redness and/or skin breakdown  2. Assess vascular access sites hourly  3. Every 4-6 hours minimum:  Change oxygen saturation probe site  4. Every 4-6 hours:  If on nasal continuous positive airway pressure, respiratory therapy assess nares and determine need for appliance change or resting period.   12/18/2022 1717 by Sen Cavazos RN  Outcome: Progressing  12/18/2022 0318 by Greg Bradshaw RN  Outcome: Progressing     Problem: Safety - Adult  Goal: Free from fall injury  12/18/2022 1717 by Sen Cavazos RN  Outcome: Progressing  12/18/2022 0318 by Greg Bradshaw RN  Outcome: Progressing     Problem: ABCDS Injury Assessment  Goal: Absence of physical injury  12/18/2022 1717 by Sen Cavazos RN  Outcome: Progressing  12/18/2022 0318 by Greg Bradshaw RN  Outcome: Progressing     Problem: Genitourinary - Adult  Goal: Absence of urinary retention  12/18/2022 1717 by Sen Cavazos RN  Outcome: Progressing  Flowsheets (Taken 12/18/2022 0930)  Absence of urinary retention:   Assess patients ability to void and empty bladder   Monitor intake/output and perform bladder scan as needed   Place urinary catheter per Licensed Independent Practitioner order if needed  12/18/2022 0318 by Lyn Vick RN  Outcome: Progressing  Flowsheets (Taken 12/17/2022 2000)  Absence of urinary retention:   Assess patients ability to void and empty bladder   Monitor intake/output and perform bladder scan as needed  Goal: Urinary catheter remains patent  12/18/2022 1717 by Severo Burton, RN  Outcome: Progressing  Flowsheets (Taken 12/18/2022 0930)  Urinary catheter remains patent: Assess patency of urinary catheter  12/18/2022 0318 by Lyn Vick RN  Outcome: Progressing     Problem: Nutrition Deficit:  Goal: Optimize nutritional status  12/18/2022 1717 by Severo Burton, RN  Outcome: Progressing  12/18/2022 0318 by Lyn Vick RN  Outcome: Progressing     Problem: Pain  Goal: Verbalizes/displays adequate comfort level or baseline comfort level  12/18/2022 1717 by Severo Burton, RN  Outcome: Progressing  Flowsheets (Taken 12/18/2022 0956)  Verbalizes/displays adequate comfort level or baseline comfort level:   Encourage patient to monitor pain and request assistance   Assess pain using appropriate pain scale   Administer analgesics based on type and severity of pain and evaluate response  12/18/2022 0318 by Lyn Vick RN  Outcome: Progressing

## 2022-12-18 NOTE — PROGRESS NOTES
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, new diagnosis  Lower abdominal pain  Unlikely spontaneous bacterial peritonitis  Questionable history of liver cirrhosis  Ascites  Failure to thrive  Diverticulosis  Coronary artery disease, native vessel  Status post CABG  Left bundle branch block  Benign prostatic hypertrophy  Mixed hyperlipidemia  Major depressive disorder  Dementia  Gastroesophageal reflux disease without esophagitis  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  Polio at age 10    Plan:      Patient is admitted to progressive unit  Oxygen to keep SPO2 more than 90%  Telemetry  Check vital signs closely  CBC BMP daily  Blood culture  Gastroenterology is involved, they are suspecting spontaneous bacterial peritonitis, patient is continued on IV antibiotics IV Rocephin, further they will follow-up fluid cultures from ascitic fluid and patient is status post paracentesis with 3 L removal fluid on 12/12/2022  Patient is status post EGD 12/15/2022, evidence of mass-effect and narrowing at GE junction and cardia multiple biopsies are taken which showed adenocarcinoma  Urology consult  Continue Plavix  Continue Crestor  Infectious disease have evaluated patient, they recommended patient likely does not have spontaneous bacterial peritonitis, for now they have patient completed IV Rocephin  Patient and the family would like to have discussion with hospice which likely happen on Monday  Continue other medication as below  Discussed with patient and family at bedside, patient CODE STATUS is changed to DNR CC      Scheduled Meds:   sodium chloride flush  5-40 mL IntraVENous 2 times per day    sodium chloride  50 mL IntraVENous Once    famotidine  40 mg Oral BID    rosuvastatin  10 mg Oral Daily    pantoprazole  40 mg Oral QAM AC    amitriptyline  25 mg Oral Nightly    donepezil  10 mg Oral BID    finasteride  5 mg Oral Daily    magnesium oxide  400 mg Oral Daily    melatonin  9 mg Oral Nightly    pregabalin  100 mg Oral BID    sucralfate  1 g Oral 4x Daily    enoxaparin  40 mg SubCUTAneous Daily     Continuous Infusions:   sodium chloride      dextrose 5 % and 0.45 % NaCl 75 mL/hr at 22 1141     PRN Meds:  fentanNYL, 25 mcg, Q4H PRN  sodium chloride flush, 5-40 mL, PRN  sodium chloride, , PRN  potassium chloride, 10 mEq, PRN  nitroGLYCERIN, 0.4 mg, Q5 Min PRN  magnesium sulfate, 1,000 mg, PRN  ondansetron, 4 mg, Q8H PRN   Or  ondansetron, 4 mg, Q6H PRN  polyethylene glycol, 17 g, Daily PRN  acetaminophen, 650 mg, Q6H PRN   Or  acetaminophen, 650 mg, Q6H PRN          Subjective:     Patient seen and examined at bedside and reviewed  last 24 hrs events with nursing staff  No acute events overnight. Patient denies any acute complaints. Afebrile  Patient denies any chest pain, shortness of Breath, palpitation, headache, dizziness, cough, nausea, vomiting, changes in urination or bowel habit or rash. Patient complains of abdominal pain      ROS:  A 10 point system reviewed and negative otherwise mentioned above. Physical Examination:      Vitals:  BP (!) 117/59   Pulse 89   Temp 98.4 °F (36.9 °C)   Resp 20   Ht 5' 9\" (1.753 m)   Wt 159 lb (72.1 kg)   SpO2 94%   BMI 23.48 kg/m²   Temp (24hrs), Av.7 °F (37.1 °C), Min:98.4 °F (36.9 °C), Max:99 °F (37.2 °C)    Weight:   Wt Readings from Last 3 Encounters:   22 159 lb (72.1 kg)   10/11/21 155 lb (70.3 kg)   20 150 lb (68 kg)     I/O last 3 completed shifts:  I/O last 3 completed shifts:   In: 23752.1 [I.V.:66099.2; IV Piggyback:297.9]  Out: 1950 [KAFI]     No results for input(s): POCGLU in the last 72 hours. General appearance - alert, well appearing, and in no acute distress  Mental status - oriented to person, place, and time with normal affect  Head - normocephalic and atraumatic  Eyes - pupils equal and reactive, extraocular eye movements intact, conjunctiva clear  Ears - hearing appears to be intact  Nose - no drainage noted  Mouth - mucous membranes moist  Neck - supple, no carotid bruits, thyroid not palpable  Chest - clear to auscultation, normal effort  Heart - normal rate, regular rhythm, no murmur  Abdomen - soft, diffuse abdominal tenderness, distended, bowel sounds present all four quadrants, no masses, hepatomegaly or splenomegaly  Neurological - normal speech, no focal findings or movement disorder noted, cranial nerves II through XII grossly intact  Extremities - peripheral pulses palpable, no pedal edema or calf pain with palpation  Skin - no gross lesions, rashes, or induration noted        Medications: Allergies:    Allergies   Allergen Reactions    Ciprofloxacin Other (See Comments)     Nerve pain exacerbation in feet    Codeine Other (See Comments)     insomia & tachycardia    Morphine      Pt states he has problems with his bp when given morphine    Pletal [Cilostazol]     Sulfamethoxazole-Trimethoprim     Pcn [Penicillins] Rash       Current Meds:   Current Facility-Administered Medications:     fentaNYL (SUBLIMAZE) injection 25 mcg, 25 mcg, IntraVENous, Q4H PRN, Elijah Loya MD, 25 mcg at 22 0956    sodium chloride flush 0.9 % injection 5-40 mL, 5-40 mL, IntraVENous, 2 times per day, Joseph Coon MD, 10 mL at 22    sodium chloride flush 0.9 % injection 5-40 mL, 5-40 mL, IntraVENous, PRN, Joseph Coon MD    0.9 % sodium chloride infusion, , IntraVENous, PRN, Joseph Coon MD    0.9 % sodium chloride bolus, 50 mL, IntraVENous, Once, Flores Blackburn MD    famotidine (PEPCID) tablet 40 mg, 40 mg, Oral, BID, Yu Li MD, 40 mg at 12/18/22 5615    rosuvastatin (CRESTOR) tablet 10 mg, 10 mg, Oral, Daily, Yu Li MD, 10 mg at 12/17/22 0901    pantoprazole (PROTONIX) tablet 40 mg, 40 mg, Oral, QAM AC, Yu Li MD, 40 mg at 12/18/22 0615    dextrose 5 % and 0.45 % sodium chloride infusion, , IntraVENous, Continuous, Yu Li MD, Last Rate: 75 mL/hr at 12/18/22 1141, New Bag at 12/18/22 1141    potassium chloride 10 mEq/100 mL IVPB (Peripheral Line), 10 mEq, IntraVENous, PRN, Yu Li MD, Last Rate: 100 mL/hr at 12/18/22 1315, 10 mEq at 12/18/22 1315    amitriptyline (ELAVIL) tablet 25 mg, 25 mg, Oral, Nightly, Yu Li MD, 25 mg at 12/17/22 2029    donepezil (ARICEPT) tablet 10 mg, 10 mg, Oral, BID, Yu Li MD, 10 mg at 12/18/22 0935    finasteride (PROSCAR) tablet 5 mg, 5 mg, Oral, Daily, Yu Li MD, 5 mg at 12/17/22 0901    magnesium oxide (MAG-OX) tablet 400 mg, 400 mg, Oral, Daily, Yu Li MD, 400 mg at 12/18/22 0941    melatonin tablet 9 mg, 9 mg, Oral, Nightly, Yu Li MD, 9 mg at 12/17/22 2021    nitroGLYCERIN (NITROSTAT) SL tablet 0.4 mg, 0.4 mg, SubLINGual, Q5 Min PRN, Yu Li MD    pregabalin (LYRICA) capsule 100 mg, 100 mg, Oral, BID, Yu Li MD, 100 mg at 12/18/22 0934    sucralfate (CARAFATE) tablet 1 g, 1 g, Oral, 4x Daily, Yu Li MD, 1 g at 12/18/22 1417    enoxaparin (LOVENOX) injection 40 mg, 40 mg, SubCUTAneous, Daily, Yu Li MD, 40 mg at 12/18/22 0942    magnesium sulfate 1000 mg in dextrose 5% 100 mL IVPB, 1,000 mg, IntraVENous, PRN, Yu Li MD, Stopped at 12/18/22 1142    ondansetron (ZOFRAN-ODT) disintegrating tablet 4 mg, 4 mg, Oral, Q8H PRN **OR** ondansetron (ZOFRAN) injection 4 mg, 4 mg, IntraVENous, Q6H PRN, Yu Li MD, 4 mg at 12/17/22 1048    polyethylene glycol (GLYCOLAX) packet 17 g, 17 g, Oral, Daily PRN, Yu Li MD    acetaminophen (TYLENOL) tablet 650 mg, 650 mg, Oral, Q6H PRN, 650 mg at 12/16/22 1233 **OR** acetaminophen (TYLENOL) suppository 650 mg, 650 mg, Rectal, Q6H PRN, Santi Flores MD      I/O (24Hr): Intake/Output Summary (Last 24 hours) at 12/18/2022 1424  Last data filed at 12/17/2022 2043  Gross per 24 hour   Intake 388.24 ml   Output 925 ml   Net -536.76 ml         Data:           Labs:    Hematology:  Recent Labs     12/16/22 0519 12/17/22 0517 12/18/22 0515   WBC 10.8 11.4* 12.9*   RBC 4.49 4.73 4.44   HGB 12.3* 13.0 12.4*   HCT 39.8* 41.6 38.4*   MCV 88.6 87.9 86.5   MCH 27.4 27.5 27.9   MCHC 30.9 31.3 32.3   RDW 13.5 13.5 13.5    381 363   MPV 10.7 10.3 10.5       Chemistry:  Recent Labs     12/16/22  0519 12/17/22  0517 12/18/22  0515    136 136   K 3.9 4.1 3.3*    99 101   CO2 26 28 26   GLUCOSE 126* 128* 121*   BUN 5* 6* 5*   CREATININE 0.71 0.86 0.62*   MG  --   --  1.6   ANIONGAP 7* 9 9   LABGLOM >60 >60 >60   CALCIUM 8.3* 8.4* 8.1*       Recent Labs     12/16/22  0519 12/17/22 0517 12/18/22  0515   PROT 5.4* 5.7* 5.3*   LABALBU 2.4* 2.5* 2.4*   AST 22 23 20   ALT 12 11 13   ALKPHOS 87 94 88   BILITOT 0.3 0.3 0.3         Lab Results   Component Value Date/Time    SPECIAL NOT REPORTED 02/18/2020 10:58 PM     Lab Results   Component Value Date/Time    CULTURE NO GROWTH 6 DAYS 12/12/2022 02:00 PM       No results found for: POCPH, PHART, PH, POCPCO2, MDL7FNJ, PCO2, POCPO2, PO2ART, PO2, POCHCO3, JJW0AZW, HCO3, NBEA, PBEA, BEART, BE, THGBART, THB, REL6DKT, HNZX4IPX, [id number], O2SAT, FIO2    Radiology:    CT ABDOMEN PELVIS W IV CONTRAST Additional Contrast? None    Result Date: 12/9/2022  Moderate ascites with associated moderate diffuse edema within the mesentery. Severe diverticulosis involving the left colon without evidence of acute diverticulitis. No abscess or perforation. Numerous bladder diverticula. Laminectomy at L4 and L5. Bilateral gynecomastia.      FL MODIFIED BARIUM SWALLOW W VIDEO    Result Date: 12/10/2022  1. 1 instance of flash penetration without aspiration with the pureed/pudding thick substance. 2. No penetration or aspiration with the remainder of the administered substances/administrations. Please see separate speech pathology report for full discussion of findings and recommendations. All radiological studies reviewed  Code Status:  [facility]        Electronically signed by Creed Riedel, MD on 12/18/2022 at 2:24 PM    This note was created with the assistance of a speech-recognition program.  Although the intention is to generate a document that actually reflects the content of the visit, no guarantees can be provided that every mistake has been identified and corrected by editing. Note was updated later by me after  physical examination and  completion of the assessment.

## 2022-12-19 ENCOUNTER — APPOINTMENT (OUTPATIENT)
Dept: ULTRASOUND IMAGING | Age: 82
DRG: 374 | End: 2022-12-19
Payer: MEDICARE

## 2022-12-19 LAB
ABSOLUTE EOS #: 0 K/UL (ref 0–0.4)
ABSOLUTE IMMATURE GRANULOCYTE: 0.13 K/UL (ref 0–0.3)
ABSOLUTE LYMPH #: 1.14 K/UL (ref 1–4.8)
ABSOLUTE MONO #: 1.52 K/UL (ref 0.2–0.8)
ALBUMIN SERPL-MCNC: 2.5 G/DL (ref 3.5–5.2)
ALP BLD-CCNC: 106 U/L (ref 40–129)
ALT SERPL-CCNC: 12 U/L (ref 5–41)
ANION GAP SERPL CALCULATED.3IONS-SCNC: 8 MMOL/L (ref 9–17)
AST SERPL-CCNC: 24 U/L
BASOPHILS # BLD: 0 %
BASOPHILS ABSOLUTE: 0 K/UL (ref 0–0.2)
BILIRUB SERPL-MCNC: 0.3 MG/DL (ref 0.3–1.2)
BUN BLDV-MCNC: 5 MG/DL (ref 8–23)
BUN/CREAT BLD: 8 (ref 9–20)
CALCIUM SERPL-MCNC: 8.1 MG/DL (ref 8.6–10.4)
CHLORIDE BLD-SCNC: 99 MMOL/L (ref 98–107)
CO2: 28 MMOL/L (ref 20–31)
CREAT SERPL-MCNC: 0.65 MG/DL (ref 0.7–1.2)
EOSINOPHILS RELATIVE PERCENT: 0 % (ref 1–4)
GFR SERPL CREATININE-BSD FRML MDRD: >60 ML/MIN/1.73M2
GLUCOSE BLD-MCNC: 141 MG/DL (ref 70–99)
HCT VFR BLD CALC: 37.9 % (ref 40.7–50.3)
HEMOGLOBIN: 12.3 G/DL (ref 13–17)
IMMATURE GRANULOCYTES: 1 %
LYMPHOCYTES # BLD: 9 % (ref 24–44)
MCH RBC QN AUTO: 28.1 PG (ref 25.2–33.5)
MCHC RBC AUTO-ENTMCNC: 32.5 G/DL (ref 28.4–34.8)
MCV RBC AUTO: 86.5 FL (ref 82.6–102.9)
MONOCYTES # BLD: 12 % (ref 1–7)
MORPHOLOGY: NORMAL
NRBC AUTOMATED: 0 PER 100 WBC
PDW BLD-RTO: 13.5 % (ref 11.8–14.4)
PLATELET # BLD: 347 K/UL (ref 138–453)
PMV BLD AUTO: 10.6 FL (ref 8.1–13.5)
POTASSIUM SERPL-SCNC: 4.2 MMOL/L (ref 3.7–5.3)
RBC # BLD: 4.38 M/UL (ref 4.21–5.77)
SEG NEUTROPHILS: 78 % (ref 36–66)
SEGMENTED NEUTROPHILS ABSOLUTE COUNT: 9.91 K/UL (ref 1.8–7.7)
SODIUM BLD-SCNC: 135 MMOL/L (ref 135–144)
TOTAL PROTEIN: 5.4 G/DL (ref 6.4–8.3)
WBC # BLD: 12.7 K/UL (ref 3.5–11.3)

## 2022-12-19 PROCEDURE — 2060000000 HC ICU INTERMEDIATE R&B

## 2022-12-19 PROCEDURE — 6360000002 HC RX W HCPCS: Performed by: FAMILY MEDICINE

## 2022-12-19 PROCEDURE — 99232 SBSQ HOSP IP/OBS MODERATE 35: CPT | Performed by: INTERNAL MEDICINE

## 2022-12-19 PROCEDURE — 6370000000 HC RX 637 (ALT 250 FOR IP): Performed by: INTERNAL MEDICINE

## 2022-12-19 PROCEDURE — 2580000003 HC RX 258: Performed by: STUDENT IN AN ORGANIZED HEALTH CARE EDUCATION/TRAINING PROGRAM

## 2022-12-19 PROCEDURE — 6360000002 HC RX W HCPCS: Performed by: INTERNAL MEDICINE

## 2022-12-19 PROCEDURE — 80053 COMPREHEN METABOLIC PANEL: CPT

## 2022-12-19 PROCEDURE — 36415 COLL VENOUS BLD VENIPUNCTURE: CPT

## 2022-12-19 PROCEDURE — 85025 COMPLETE CBC W/AUTO DIFF WBC: CPT

## 2022-12-19 PROCEDURE — 76705 ECHO EXAM OF ABDOMEN: CPT

## 2022-12-19 PROCEDURE — 2580000003 HC RX 258: Performed by: INTERNAL MEDICINE

## 2022-12-19 RX ADMIN — SODIUM CHLORIDE, PRESERVATIVE FREE 10 ML: 5 INJECTION INTRAVENOUS at 21:18

## 2022-12-19 RX ADMIN — FAMOTIDINE 40 MG: 20 TABLET, FILM COATED ORAL at 21:13

## 2022-12-19 RX ADMIN — PANTOPRAZOLE SODIUM 40 MG: 40 TABLET, DELAYED RELEASE ORAL at 05:13

## 2022-12-19 RX ADMIN — ONDANSETRON 4 MG: 2 INJECTION INTRAMUSCULAR; INTRAVENOUS at 19:11

## 2022-12-19 RX ADMIN — DEXTROSE AND SODIUM CHLORIDE: 5; 450 INJECTION, SOLUTION INTRAVENOUS at 01:04

## 2022-12-19 RX ADMIN — FENTANYL CITRATE 25 MCG: 0.05 INJECTION, SOLUTION INTRAMUSCULAR; INTRAVENOUS at 13:02

## 2022-12-19 RX ADMIN — FENTANYL CITRATE 25 MCG: 0.05 INJECTION, SOLUTION INTRAMUSCULAR; INTRAVENOUS at 21:40

## 2022-12-19 RX ADMIN — PREGABALIN 100 MG: 100 CAPSULE ORAL at 21:13

## 2022-12-19 RX ADMIN — Medication 9 MG: at 21:13

## 2022-12-19 RX ADMIN — DONEPEZIL HYDROCHLORIDE 10 MG: 10 TABLET, FILM COATED ORAL at 21:13

## 2022-12-19 RX ADMIN — SODIUM CHLORIDE, PRESERVATIVE FREE 10 ML: 5 INJECTION INTRAVENOUS at 03:52

## 2022-12-19 RX ADMIN — PREGABALIN 100 MG: 100 CAPSULE ORAL at 09:18

## 2022-12-19 RX ADMIN — FENTANYL CITRATE 25 MCG: 0.05 INJECTION, SOLUTION INTRAMUSCULAR; INTRAVENOUS at 09:10

## 2022-12-19 RX ADMIN — FENTANYL CITRATE 25 MCG: 0.05 INJECTION, SOLUTION INTRAMUSCULAR; INTRAVENOUS at 17:41

## 2022-12-19 RX ADMIN — DONEPEZIL HYDROCHLORIDE 10 MG: 10 TABLET, FILM COATED ORAL at 09:18

## 2022-12-19 RX ADMIN — FAMOTIDINE 40 MG: 20 TABLET, FILM COATED ORAL at 09:18

## 2022-12-19 RX ADMIN — FENTANYL CITRATE 25 MCG: 0.05 INJECTION, SOLUTION INTRAMUSCULAR; INTRAVENOUS at 03:52

## 2022-12-19 ASSESSMENT — PAIN SCALES - GENERAL
PAINLEVEL_OUTOF10: 7

## 2022-12-19 NOTE — PROGRESS NOTES
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, new diagnosis  Lower abdominal pain  Unlikely spontaneous bacterial peritonitis  Questionable history of liver cirrhosis  Ascites  Failure to thrive  Diverticulosis  Coronary artery disease, native vessel  Status post CABG  Left bundle branch block  Benign prostatic hypertrophy  Mixed hyperlipidemia  Major depressive disorder  Dementia  Gastroesophageal reflux disease without esophagitis  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  Polio at age 10    Plan:      Patient is admitted to progressive unit  Oxygen to keep SPO2 more than 90%  Telemetry  Check vital signs closely  CBC BMP daily  Blood culture  Gastroenterology is involved, they are suspecting spontaneous bacterial peritonitis, patient is continued on IV antibiotics IV Rocephin, further they will follow-up fluid cultures from ascitic fluid and patient is status post paracentesis with 3 L removal fluid on 12/12/2022  Patient is status post EGD 12/15/2022, evidence of mass-effect and narrowing at GE junction and cardia multiple biopsies are taken which showed adenocarcinoma  Urology consult  Continue Plavix  Continue Crestor  Infectious disease have evaluated patient, they recommended patient likely does not have spontaneous bacterial peritonitis, for now they have patient completed IV Rocephin  Patient and the family would like to have discussion with hospice which likely happen on Monday  Continue other medication as below  Hospice consult  Plan Home with hospice  Discussed with patient and family at bedside, patient CODE STATUS is changed to DNR CC      Scheduled Meds:   sodium chloride flush  5-40 mL IntraVENous 2 times per day    sodium chloride  50 mL IntraVENous Once    famotidine  40 mg Oral BID    rosuvastatin  10 mg Oral Daily    pantoprazole  40 mg Oral QAM AC    amitriptyline  25 mg Oral Nightly    donepezil  10 mg Oral BID    finasteride  5 mg Oral Daily    magnesium oxide  400 mg Oral Daily    melatonin  9 mg Oral Nightly    pregabalin  100 mg Oral BID    sucralfate  1 g Oral 4x Daily    enoxaparin  40 mg SubCUTAneous Daily     Continuous Infusions:   sodium chloride      dextrose 5 % and 0.45 % NaCl 75 mL/hr at 22 0104     PRN Meds:  fentanNYL, 25 mcg, Q4H PRN  sodium chloride flush, 5-40 mL, PRN  sodium chloride, , PRN  potassium chloride, 10 mEq, PRN  nitroGLYCERIN, 0.4 mg, Q5 Min PRN  magnesium sulfate, 1,000 mg, PRN  ondansetron, 4 mg, Q8H PRN   Or  ondansetron, 4 mg, Q6H PRN  polyethylene glycol, 17 g, Daily PRN  acetaminophen, 650 mg, Q6H PRN   Or  acetaminophen, 650 mg, Q6H PRN          Subjective:     Patient seen and examined at bedside and reviewed  last 24 hrs events with nursing staff  No acute events overnight. Patient denies any acute complaints. Afebrile  Patient denies any chest pain, shortness of Breath, palpitation, headache, dizziness, cough, nausea, vomiting, changes in urination or bowel habit or rash. Patient denies any abdominal pain  IR unable to drain more fluid  Family at bedside  Likely discharge home with hospice in a.m.      ROS:  A 10 point system reviewed and negative otherwise mentioned above.         Physical Examination:      Vitals:  /75   Pulse 82   Temp 98.8 °F (37.1 °C) (Oral)   Resp 18   Ht 5' 9\" (1.753 m)   Wt 159 lb (72.1 kg)   SpO2 92%   BMI 23.48 kg/m²   Temp (24hrs), Av.2 °F (36.8 °C), Min:97.3 °F (36.3 °C), Max:98.8 °F (37.1 °C)    Weight:   Wt Readings from Last 3 Encounters:   22 159 lb (72.1 kg)   10/11/21 155 lb (70.3 kg)   20 150 lb (68 kg)     I/O last 3 completed shifts:  I/O last 3 completed shifts: In: 2759.7 [P.O.:550; I.V.:1799.6; IV Piggyback:410.2]  Out: 2600 [Urine:2600]     No results for input(s): POCGLU in the last 72 hours. General appearance - alert, well appearing, and in no acute distress  Mental status - oriented to person, place, and time with normal affect  Head - normocephalic and atraumatic  Eyes - pupils equal and reactive, extraocular eye movements intact, conjunctiva clear  Ears - hearing appears to be intact  Nose - no drainage noted  Mouth - mucous membranes moist  Neck - supple, no carotid bruits, thyroid not palpable  Chest - clear to auscultation, normal effort  Heart - normal rate, regular rhythm, no murmur  Abdomen - soft, diffuse abdominal tenderness, distended, bowel sounds present all four quadrants, no masses, hepatomegaly or splenomegaly  Neurological - normal speech, no focal findings or movement disorder noted, cranial nerves II through XII grossly intact  Extremities - peripheral pulses palpable, no pedal edema or calf pain with palpation  Skin - no gross lesions, rashes, or induration noted        Medications: Allergies:    Allergies   Allergen Reactions    Ciprofloxacin Other (See Comments)     Nerve pain exacerbation in feet    Codeine Other (See Comments)     insomia & tachycardia    Morphine      Pt states he has problems with his bp when given morphine    Pletal [Cilostazol]     Sulfamethoxazole-Trimethoprim     Pcn [Penicillins] Rash       Current Meds:   Current Facility-Administered Medications:     fentaNYL (SUBLIMAZE) injection 25 mcg, 25 mcg, IntraVENous, Q4H PRN, Elijah Loya MD, 25 mcg at 12/19/22 1741    sodium chloride flush 0.9 % injection 5-40 mL, 5-40 mL, IntraVENous, 2 times per day, Dario Badillo MD, 10 mL at 12/17/22 2033    sodium chloride flush 0.9 % injection 5-40 mL, 5-40 mL, IntraVENous, PRN, Dario Badillo MD, 10 mL at 12/19/22 0352    0.9 % sodium chloride infusion, , IntraVENous, PRN, Rubi Heurta MD    0.9 % sodium chloride bolus, 50 mL, IntraVENous, Once, Kayley Kaur MD    famotidine (PEPCID) tablet 40 mg, 40 mg, Oral, BID, Kayley Kaur MD, 40 mg at 12/19/22 4936    rosuvastatin (CRESTOR) tablet 10 mg, 10 mg, Oral, Daily, Kayley Kaur MD, 10 mg at 12/17/22 0901    pantoprazole (PROTONIX) tablet 40 mg, 40 mg, Oral, QAM AC, Kayley Kaur MD, 40 mg at 12/19/22 0513    dextrose 5 % and 0.45 % sodium chloride infusion, , IntraVENous, Continuous, Kayley Kaur MD, Last Rate: 75 mL/hr at 12/19/22 0104, New Bag at 12/19/22 0104    potassium chloride 10 mEq/100 mL IVPB (Peripheral Line), 10 mEq, IntraVENous, PRN, Kayley Kaur MD, Stopped at 12/18/22 1419    amitriptyline (ELAVIL) tablet 25 mg, 25 mg, Oral, Nightly, Kayley Kaur MD, 25 mg at 12/18/22 2043    donepezil (ARICEPT) tablet 10 mg, 10 mg, Oral, BID, Kayley Kaur MD, 10 mg at 12/19/22 3996    finasteride (PROSCAR) tablet 5 mg, 5 mg, Oral, Daily, Kayley Kaur MD, 5 mg at 12/17/22 0901    magnesium oxide (MAG-OX) tablet 400 mg, 400 mg, Oral, Daily, Kayley Kaur MD, 400 mg at 12/18/22 0941    melatonin tablet 9 mg, 9 mg, Oral, Nightly, Kayley Kaur MD, 9 mg at 12/18/22 2043    nitroGLYCERIN (NITROSTAT) SL tablet 0.4 mg, 0.4 mg, SubLINGual, Q5 Min PRN, Kayley Kaur MD    pregabalin (LYRICA) capsule 100 mg, 100 mg, Oral, BID, Kayley Kaur MD, 100 mg at 12/19/22 0918    sucralfate (CARAFATE) tablet 1 g, 1 g, Oral, 4x Daily, Kayley Kaur MD, 1 g at 12/18/22 2043    enoxaparin (LOVENOX) injection 40 mg, 40 mg, SubCUTAneous, Daily, Kayley Kaur MD, 40 mg at 12/18/22 0942    magnesium sulfate 1000 mg in dextrose 5% 100 mL IVPB, 1,000 mg, IntraVENous, PRN, Kayley Kaur MD, Stopped at 12/18/22 1142    ondansetron (ZOFRAN-ODT) disintegrating tablet 4 mg, 4 mg, Oral, Q8H PRN **OR** ondansetron (ZOFRAN) injection 4 mg, 4 mg, IntraVENous, Q6H PRN, Kayley Kaur MD, 4 mg at 12/17/22 1048 polyethylene glycol (GLYCOLAX) packet 17 g, 17 g, Oral, Daily PRN, Tacho Rios MD    acetaminophen (TYLENOL) tablet 650 mg, 650 mg, Oral, Q6H PRN, 650 mg at 12/16/22 1233 **OR** acetaminophen (TYLENOL) suppository 650 mg, 650 mg, Rectal, Q6H PRN, Tacho Rios MD      I/O (24Hr): Intake/Output Summary (Last 24 hours) at 12/19/2022 1749  Last data filed at 12/19/2022 1634  Gross per 24 hour   Intake 2138.53 ml   Output 1850 ml   Net 288.53 ml         Data:           Labs:    Hematology:  Recent Labs     12/17/22  0517 12/18/22  0515 12/19/22  0545   WBC 11.4* 12.9* 12.7*   RBC 4.73 4.44 4.38   HGB 13.0 12.4* 12.3*   HCT 41.6 38.4* 37.9*   MCV 87.9 86.5 86.5   MCH 27.5 27.9 28.1   MCHC 31.3 32.3 32.5   RDW 13.5 13.5 13.5    363 347   MPV 10.3 10.5 10.6       Chemistry:  Recent Labs     12/17/22  0517 12/18/22  0515 12/18/22  1606 12/19/22  0545    136  --  135   K 4.1 3.3* 3.6* 4.2   CL 99 101  --  99   CO2 28 26  --  28   GLUCOSE 128* 121*  --  141*   BUN 6* 5*  --  5*   CREATININE 0.86 0.62*  --  0.65*   MG  --  1.6  --   --    ANIONGAP 9 9  --  8*   LABGLOM >60 >60  --  >60   CALCIUM 8.4* 8.1*  --  8.1*       Recent Labs     12/17/22  0517 12/18/22  0515 12/19/22  0545   PROT 5.7* 5.3* 5.4*   LABALBU 2.5* 2.4* 2.5*   AST 23 20 24   ALT 11 13 12   ALKPHOS 94 88 106   BILITOT 0.3 0.3 0.3         Lab Results   Component Value Date/Time    SPECIAL NOT REPORTED 02/18/2020 10:58 PM     Lab Results   Component Value Date/Time    CULTURE NO GROWTH 6 DAYS 12/12/2022 02:00 PM       No results found for: POCPH, PHART, PH, POCPCO2, SKN7OLF, PCO2, POCPO2, PO2ART, PO2, POCHCO3, NGL8ADL, HCO3, NBEA, PBEA, BEART, BE, THGBART, THB, VZA1BPC, VQEE6VUS, V5BHZEJJ, O2SAT, FIO2    Radiology:    CT ABDOMEN PELVIS W IV CONTRAST Additional Contrast? None    Result Date: 12/9/2022  Moderate ascites with associated moderate diffuse edema within the mesentery.  Severe diverticulosis involving the left colon without evidence of acute diverticulitis. No abscess or perforation. Numerous bladder diverticula. Laminectomy at L4 and L5. Bilateral gynecomastia. FL MODIFIED BARIUM SWALLOW W VIDEO    Result Date: 12/10/2022  1. 1 instance of flash penetration without aspiration with the pureed/pudding thick substance. 2. No penetration or aspiration with the remainder of the administered substances/administrations. Please see separate speech pathology report for full discussion of findings and recommendations. All radiological studies reviewed  Code Status:  [facility]        Electronically signed by Skylar Casanova MD on 12/19/2022 at 5:49 PM    This note was created with the assistance of a speech-recognition program.  Although the intention is to generate a document that actually reflects the content of the visit, no guarantees can be provided that every mistake has been identified and corrected by editing. Note was updated later by me after  physical examination and  completion of the assessment.

## 2022-12-19 NOTE — PROGRESS NOTES
IR phoned and radiologist reviewed ultrasound and they do not see enough fluid right now to insert drain, they will reassess in am and insert drain then if possible, pt and family informed

## 2022-12-19 NOTE — PROGRESS NOTES
Patient vomited shortly after 2100 medication administration with approximately 120 ml of fluid and unknown amount of medication brought up. Patient resting comfortably after vomit episode.

## 2022-12-19 NOTE — CARE COORDINATION
Discharge planning    [facility] RN met with family. They are ordering DME tonight. Will notify writer when they can have patient set up for transport home. They will schedule RN to open hospice care on arrival home tomorrow    [facility] set up transport for twin to  patient at 1 pm tomorrow.  Will need dc order and notified RN

## 2022-12-19 NOTE — PROGRESS NOTES
Physician Progress Note      PATIENT:               Jessica Chen  CSN #:                  [id number]  :                       1940  ADMIT DATE:       2022 5:11 PM  DISCH DATE:  RESPONDING  PROVIDER #:        Cristy Fontaine MD          QUERY TEXT:    Pt admitted with ascites, adult failure to thrive  . Noted documentation of   severe malnutrition on  by ordered nutritional consultant. If possible,   please document in progress notes and discharge summary:      The medical record reflects the following:  Risk Factors: ascites, suspected SBP, decreased appetite , early satiety  Clinical Indicators: Severe malnutrition related to inadequate protein-energy   intake as evidenced by intake 26-50%, intake 0-25%, weight loss, severe muscle   loss, severe loss of subcutaneous fat  Treatment: Start Ensure Clear 3x/day, Monitor p.o intakes, diet tolerance, and   labs  Options provided:  -- Severe malnutrition confirmed present on admission  -- Severe malnutrition ruled out  -- Other - I will add my own diagnosis  -- Disagree - Not applicable / Not valid  -- Disagree - Clinically unable to determine / Unknown  -- Refer to Clinical Documentation Reviewer    PROVIDER RESPONSE TEXT:    The diagnosis of Severe malnutrition was confirmed as present on admission.     Query created by: Claire Rudolph on 2022 7:07 AM      Electronically signed by:  Cristy Fontaine MD 2022 5:50 PM

## 2022-12-19 NOTE — PLAN OF CARE
Problem: Safety - Adult  Goal: Free from fall injury  Outcome: Progressing       Problem: ABCDS Injury Assessment  Goal: Absence of physical injury  Outcome: Progressing     Patient has remained free from falls this shift. Patient is alert and oriented times two . Bed to lowest position with door open. Patient care items and call light in reach. Patient uses call light appropriately for assist. Will continue to monitor. Please see fall assessment.

## 2022-12-19 NOTE — PROGRESS NOTES
Pt resting in bed without distress, pt wife refused some oral medications today because too many pills tend to make pt vomit, pt will be discharged tomorrow at 1315 to home with hospice

## 2022-12-19 NOTE — PLAN OF CARE
Patient cooperative throughout shift. Patients vitals remained WDL. Patient is Afib. Patient c/o leg pain throughout the shift and was given Fentanyl PRN. Patient tolerated well. Patient was given Zofran for nausea as well. Patient remains free from falls. Call light and bedside table are within reach. Standard safety measures applied. Problem: Discharge Planning  Goal: Discharge to home or other facility with appropriate resources  12/19/2022 0018 by Pravin Hughes RN  Outcome: Progressing     Problem: Skin/Tissue Integrity  Goal: Absence of new skin breakdown  Description: 1. Monitor for areas of redness and/or skin breakdown  2. Assess vascular access sites hourly  3. Every 4-6 hours minimum:  Change oxygen saturation probe site  4. Every 4-6 hours:  If on nasal continuous positive airway pressure, respiratory therapy assess nares and determine need for appliance change or resting period.   12/19/2022 0018 by Pravin Hughes RN  Outcome: Progressing     Problem: Safety - Adult  Goal: Free from fall injury  12/19/2022 0018 by Pravin Hughes RN  Outcome: Progressing     Problem: ABCDS Injury Assessment  Goal: Absence of physical injury  12/19/2022 0018 by Pravin Hughes RN  Outcome: Progressing     Problem: Genitourinary - Adult  Goal: Absence of urinary retention  12/19/2022 0018 by Pravin Hughes RN  Outcome: Progressing     Problem: Pain  Goal: Verbalizes/displays adequate comfort level or baseline comfort level  12/19/2022 0018 by Pravin Hughes RN  Outcome: Progressing

## 2022-12-19 NOTE — PROGRESS NOTES
End Of Shift Note  [address] ICU  Summary of shift: Pt am K 3.3 and 40Meq KCL IV given, Magnesium  1.6 and 2 grams Mag sulfate given. Recheck of K 3.6 this afternoon. Pt given fentanyl X2 for c/o pain in toes/feet. Multiple family members visited and very attentive to pt needs. Pt has orders for hospice consult for Monday at 1p and consult to IR for possible drain.     Vitals:    Vitals:    12/18/22 0519 12/18/22 0736 12/18/22 1201 12/18/22 1545   BP: 118/80 106/63 (!) 117/59 115/65   Pulse: (!) 102 77 89 60   Resp: 16 16 20 18   Temp: 99 °F (37.2 °C) 98.4 °F (36.9 °C) 98.4 °F (36.9 °C) 97.9 °F (36.6 °C)   TempSrc:  Oral     SpO2: 92% 93% 94% 94%   Weight:       Height:            I&O:   Intake/Output Summary (Last 24 hours) at 12/18/2022 2004  Last data filed at 12/18/2022 1804  Gross per 24 hour   Intake 2688.53 ml   Output 1400 ml   Net 1288.53 ml       Resp Status: RA    Critical Care IV infusions:   sodium chloride      dextrose 5 % and 0.45 % NaCl 75 mL/hr at 12/18/22 1804        LDA:   Peripheral IV 12/13/22 Left;Ventral Wrist (Active)   Number of days: 5       Urinary Catheter 12/11/22 (Active)   Number of days: 7

## 2022-12-19 NOTE — PROGRESS NOTES
[facility]   [facility]            Subjective: Follow-up urinary retention enlarged prostate    Patient Vitals for the past 24 hrs:   BP Temp Temp src Pulse Resp SpO2   12/19/22 0538 132/71 98.8 °F (37.1 °C) Temporal (!) 101 16 94 %   12/19/22 0048 120/70 97.7 °F (36.5 °C) Temporal 72 16 94 %   12/18/22 1545 115/65 97.9 °F (36.6 °C) -- 60 18 94 %   12/18/22 1201 (!) 117/59 98.4 °F (36.9 °C) -- 89 20 94 %   12/18/22 0736 106/63 98.4 °F (36.9 °C) Oral 77 16 93 %       Intake/Output Summary (Last 24 hours) at 12/19/2022 0735  Last data filed at 12/19/2022 5430  Gross per 24 hour   Intake 2688.53 ml   Output 2150 ml   Net 538.53 ml       Recent Labs     12/17/22  0517 12/18/22  0515 12/19/22  0545   WBC 11.4* 12.9* 12.7*   HGB 13.0 12.4* 12.3*   HCT 41.6 38.4* 37.9*   MCV 87.9 86.5 86.5    363 347     Recent Labs     12/17/22  0517 12/18/22  0515 12/18/22  1606 12/19/22  0545    136  --  135   K 4.1 3.3* 3.6* 4.2   CL 99 101  --  99   CO2 28 26  --  28   BUN 6* 5*  --  5*   CREATININE 0.86 0.62*  --  0.65*       No results for input(s): COLORU, PHUR, LABCAST, WBCUA, RBCUA, MUCUS, TRICHOMONAS, YEAST, BACTERIA, CLARITYU, SPECGRAV, LEUKOCYTESUR, UROBILINOGEN, BILIRUBINUR, BLOODU in the last 72 hours.     Invalid input(s): NITRATE, GLUCOSEUKETONESUAMORPHOUS    Additional Lab/culture results:    Physical Exam: Patient not in acute distress, family at bedside, reviewed with patient's family the pathology report and plan of care, patient is not strong enough to get up and use the bathroom and to prevent urinary incontinence in bed, the family at the present time prefers to maintain the indwelling Albrihgt    Consultation with hospice later today with plan for home hospice care    Interval Imaging Findings:    Impression:    Patient Active Problem List   Diagnosis    Neuropathy of both feet    LBBB (left bundle branch block)    S/P CABG (coronary artery bypass graft) Unstable angina (HCC)    S/P cardiac cath    S/P ICD (internal cardiac defibrillator) procedure    Other ascites    Lower abdominal pain    Diffuse abdominal pain    Failure to thrive in adult    Anorexia    Severe malnutrition (HCC)    Malignant neoplasm of lower third of esophagus (HCC)    Dysphagia    Esophageal mass       Plan: Maintain indwelling Albright    Dayton Lester MD  7:35 AM 12/19/2022

## 2022-12-20 ENCOUNTER — APPOINTMENT (OUTPATIENT)
Dept: INTERVENTIONAL RADIOLOGY/VASCULAR | Age: 82
DRG: 374 | End: 2022-12-20
Payer: MEDICARE

## 2022-12-20 VITALS
DIASTOLIC BLOOD PRESSURE: 70 MMHG | OXYGEN SATURATION: 91 % | HEART RATE: 73 BPM | HEIGHT: 69 IN | SYSTOLIC BLOOD PRESSURE: 113 MMHG | TEMPERATURE: 98.2 F | RESPIRATION RATE: 16 BRPM | WEIGHT: 159 LBS | BODY MASS INDEX: 23.55 KG/M2

## 2022-12-20 LAB
ABSOLUTE EOS #: 0.07 K/UL (ref 0–0.44)
ABSOLUTE IMMATURE GRANULOCYTE: 0.09 K/UL (ref 0–0.3)
ABSOLUTE LYMPH #: 0.92 K/UL (ref 1.1–3.7)
ABSOLUTE MONO #: 1.44 K/UL (ref 0.1–1.2)
ALBUMIN SERPL-MCNC: 2.3 G/DL (ref 3.5–5.2)
ALP BLD-CCNC: 114 U/L (ref 40–129)
ALT SERPL-CCNC: 10 U/L (ref 5–41)
ANION GAP SERPL CALCULATED.3IONS-SCNC: 9 MMOL/L (ref 9–17)
AST SERPL-CCNC: 19 U/L
BASOPHILS # BLD: 0 % (ref 0–2)
BASOPHILS ABSOLUTE: 0.05 K/UL (ref 0–0.2)
BILIRUB SERPL-MCNC: 0.3 MG/DL (ref 0.3–1.2)
BUN BLDV-MCNC: 6 MG/DL (ref 8–23)
BUN/CREAT BLD: 10 (ref 9–20)
CALCIUM SERPL-MCNC: 8.2 MG/DL (ref 8.6–10.4)
CHLORIDE BLD-SCNC: 99 MMOL/L (ref 98–107)
CO2: 27 MMOL/L (ref 20–31)
CREAT SERPL-MCNC: 0.59 MG/DL (ref 0.7–1.2)
EOSINOPHILS RELATIVE PERCENT: 1 % (ref 1–4)
GFR SERPL CREATININE-BSD FRML MDRD: >60 ML/MIN/1.73M2
GLUCOSE BLD-MCNC: 129 MG/DL (ref 70–99)
HCT VFR BLD CALC: 39.1 % (ref 40.7–50.3)
HEMOGLOBIN: 12.5 G/DL (ref 13–17)
IMMATURE GRANULOCYTES: 1 %
LYMPHOCYTES # BLD: 7 % (ref 24–43)
MAGNESIUM: 1.8 MG/DL (ref 1.6–2.6)
MCH RBC QN AUTO: 27.6 PG (ref 25.2–33.5)
MCHC RBC AUTO-ENTMCNC: 32 G/DL (ref 28.4–34.8)
MCV RBC AUTO: 86.3 FL (ref 82.6–102.9)
MONOCYTES # BLD: 11 % (ref 3–12)
NRBC AUTOMATED: 0 PER 100 WBC
PDW BLD-RTO: 13.6 % (ref 11.8–14.4)
PLATELET # BLD: 352 K/UL (ref 138–453)
PMV BLD AUTO: 10.6 FL (ref 8.1–13.5)
POTASSIUM SERPL-SCNC: 3.5 MMOL/L (ref 3.7–5.3)
RBC # BLD: 4.53 M/UL (ref 4.21–5.77)
SEG NEUTROPHILS: 80 % (ref 36–65)
SEGMENTED NEUTROPHILS ABSOLUTE COUNT: 10.72 K/UL (ref 1.5–8.1)
SODIUM BLD-SCNC: 135 MMOL/L (ref 135–144)
TOTAL PROTEIN: 5.4 G/DL (ref 6.4–8.3)
WBC # BLD: 13.3 K/UL (ref 3.5–11.3)

## 2022-12-20 PROCEDURE — 0W9J30Z DRAINAGE OF PELVIC CAVITY WITH DRAINAGE DEVICE, PERCUTANEOUS APPROACH: ICD-10-PCS | Performed by: RADIOLOGY

## 2022-12-20 PROCEDURE — 6370000000 HC RX 637 (ALT 250 FOR IP): Performed by: INTERNAL MEDICINE

## 2022-12-20 PROCEDURE — 85025 COMPLETE CBC W/AUTO DIFF WBC: CPT

## 2022-12-20 PROCEDURE — C1729 CATH, DRAINAGE: HCPCS

## 2022-12-20 PROCEDURE — 80053 COMPREHEN METABOLIC PANEL: CPT

## 2022-12-20 PROCEDURE — 2580000003 HC RX 258: Performed by: STUDENT IN AN ORGANIZED HEALTH CARE EDUCATION/TRAINING PROGRAM

## 2022-12-20 PROCEDURE — 2580000003 HC RX 258: Performed by: INTERNAL MEDICINE

## 2022-12-20 PROCEDURE — 83735 ASSAY OF MAGNESIUM: CPT

## 2022-12-20 PROCEDURE — 0JH83XZ INSERTION OF TUNNELED VASCULAR ACCESS DEVICE INTO ABDOMEN SUBCUTANEOUS TISSUE AND FASCIA, PERCUTANEOUS APPROACH: ICD-10-PCS | Performed by: RADIOLOGY

## 2022-12-20 PROCEDURE — 6360000002 HC RX W HCPCS: Performed by: FAMILY MEDICINE

## 2022-12-20 PROCEDURE — 49418 INSERT TUN IP CATH PERC: CPT

## 2022-12-20 PROCEDURE — 36415 COLL VENOUS BLD VENIPUNCTURE: CPT

## 2022-12-20 PROCEDURE — 6360000002 HC RX W HCPCS: Performed by: INTERNAL MEDICINE

## 2022-12-20 RX ORDER — SUCRALFATE 1 G/1
1 TABLET ORAL 4 TIMES DAILY
Qty: 120 TABLET | Refills: 3 | Status: SHIPPED | OUTPATIENT
Start: 2022-12-20

## 2022-12-20 RX ORDER — LANOLIN ALCOHOL/MO/W.PET/CERES
9 CREAM (GRAM) TOPICAL NIGHTLY
Qty: 30 TABLET | Refills: 0 | Status: SHIPPED | OUTPATIENT
Start: 2022-12-20

## 2022-12-20 RX ORDER — FENTANYL 25 UG/H
1 PATCH TRANSDERMAL
Qty: 3 PATCH | Refills: 0 | Status: SHIPPED | OUTPATIENT
Start: 2022-12-20 | End: 2022-12-29

## 2022-12-20 RX ADMIN — ONDANSETRON 4 MG: 2 INJECTION INTRAMUSCULAR; INTRAVENOUS at 10:10

## 2022-12-20 RX ADMIN — FAMOTIDINE 40 MG: 20 TABLET, FILM COATED ORAL at 10:10

## 2022-12-20 RX ADMIN — PANTOPRAZOLE SODIUM 40 MG: 40 TABLET, DELAYED RELEASE ORAL at 06:39

## 2022-12-20 RX ADMIN — DEXTROSE AND SODIUM CHLORIDE: 5; 450 INJECTION, SOLUTION INTRAVENOUS at 01:23

## 2022-12-20 RX ADMIN — SODIUM CHLORIDE, PRESERVATIVE FREE 10 ML: 5 INJECTION INTRAVENOUS at 03:54

## 2022-12-20 RX ADMIN — FENTANYL CITRATE 25 MCG: 0.05 INJECTION, SOLUTION INTRAMUSCULAR; INTRAVENOUS at 03:53

## 2022-12-20 RX ADMIN — PREGABALIN 100 MG: 100 CAPSULE ORAL at 10:10

## 2022-12-20 RX ADMIN — DONEPEZIL HYDROCHLORIDE 10 MG: 10 TABLET, FILM COATED ORAL at 10:10

## 2022-12-20 RX ADMIN — FENTANYL CITRATE 25 MCG: 0.05 INJECTION, SOLUTION INTRAMUSCULAR; INTRAVENOUS at 13:24

## 2022-12-20 ASSESSMENT — PAIN SCALES - GENERAL
PAINLEVEL_OUTOF10: 7
PAINLEVEL_OUTOF10: 7

## 2022-12-20 NOTE — FLOWSHEET NOTE
Patient brought down for a Aspira drain. Drained off 3.7 liters of nereida fluid, patient tolerated procedure well vitals stable and charted.

## 2022-12-20 NOTE — PLAN OF CARE
Patient resting comfortably. Pain controlled throughout the night. IV on left wrist infiltrated and new IV on right forearm was initiated. Patient tolerated well. Albright remains patent. Patient remains free from falls. Safety measures in place. Call light and bedside table within reach. Problem: Discharge Planning  Goal: Discharge to home or other facility with appropriate resources  Outcome: Progressing     Problem: Skin/Tissue Integrity  Goal: Absence of new skin breakdown  Description: 1. Monitor for areas of redness and/or skin breakdown  2. Assess vascular access sites hourly  3. Every 4-6 hours minimum:  Change oxygen saturation probe site  4. Every 4-6 hours:  If on nasal continuous positive airway pressure, respiratory therapy assess nares and determine need for appliance change or resting period.   Outcome: Progressing     Problem: Safety - Adult  Goal: Free from fall injury  12/20/2022 0318 by Khurram Bean RN  Outcome: Progressing     Problem: ABCDS Injury Assessment  Goal: Absence of physical injury  12/20/2022 0318 by Khurram Bean RN  Outcome: Progressing  Flowsheets (Taken 12/19/2022 2000)  Absence of Physical Injury: Implement safety measures based on patient assessment     Problem: Genitourinary - Adult  Goal: Absence of urinary retention  Outcome: Progressing     Problem: Nutrition Deficit:  Goal: Optimize nutritional status  Outcome: Progressing     Problem: Pain  Goal: Verbalizes/displays adequate comfort level or baseline comfort level  Outcome: Progressing

## 2022-12-20 NOTE — PROGRESS NOTES
[facility]   Urology Progress Note            Subjective: follow-up urinary retention indwelling Albright    Patient Vitals for the past 24 hrs:   BP Temp Temp src Pulse Resp SpO2   12/20/22 0448 119/69 98.8 °F (37.1 °C) Temporal 87 18 92 %   12/20/22 0019 (!) 125/90 99.5 °F (37.5 °C) Temporal 88 16 92 %   12/19/22 1949 122/63 98.8 °F (37.1 °C) Temporal (!) 104 15 92 %   12/19/22 1634 125/75 98.8 °F (37.1 °C) Oral 82 18 92 %   12/19/22 0806 117/71 97.3 °F (36.3 °C) Oral 77 16 94 %       Intake/Output Summary (Last 24 hours) at 12/20/2022 0802  Last data filed at 12/20/2022 2312  Gross per 24 hour   Intake 2205.35 ml   Output 1475 ml   Net 730.35 ml       Recent Labs     12/18/22  0515 12/19/22  0545 12/20/22  0528   WBC 12.9* 12.7* 13.3*   HGB 12.4* 12.3* 12.5*   HCT 38.4* 37.9* 39.1*   MCV 86.5 86.5 86.3    347 352     Recent Labs     12/18/22  0515 12/18/22  1606 12/19/22  0545 12/20/22  0528     --  135 135   K 3.3* 3.6* 4.2 3.5*     --  99 99   CO2 26  --  28 27   BUN 5*  --  5* 6*   CREATININE 0.62*  --  0.65* 0.59*       No results for input(s): COLORU, PHUR, LABCAST, WBCUA, RBCUA, MUCUS, TRICHOMONAS, YEAST, BACTERIA, CLARITYU, SPECGRAV, LEUKOCYTESUR, UROBILINOGEN, BILIRUBINUR, BLOODU in the last 72 hours.     Invalid input(s): NITRATE, GLUCOSEUKETONESUAMORPHOUS    Additional Lab/culture results:    Physical Exam: patient in bed, Albright catheter in place, bladder decompressed    Discussed with the patient's wife at bedside catheter removal and trial of voiding, since the patient is extremely weak and a concern about loss of balance when he gets up for the bathroom patient and family are requesting that we maintain the indwelling Albright from the present time    Interval Imaging Findings:    Impression:    Patient Active Problem List   Diagnosis    Neuropathy of both feet    LBBB (left bundle branch block)    S/P CABG (coronary artery bypass graft)    Unstable angina (HCC)    S/P cardiac cath    S/P ICD (internal cardiac defibrillator) procedure    Other ascites    Lower abdominal pain    Diffuse abdominal pain    Failure to thrive in adult    Anorexia    Severe malnutrition (HCC)    Malignant neoplasm of lower third of esophagus (HCC)    Dysphagia    Esophageal mass       Plan: maintain an indwelling Albright    Corey Dang MD  6:33 AM 12/20/2022

## 2022-12-20 NOTE — PROGRESS NOTES
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, new diagnosis  Lower abdominal pain  Unlikely spontaneous bacterial peritonitis  Questionable history of liver cirrhosis  Ascites  Failure to thrive  Diverticulosis  Coronary artery disease, native vessel  Status post CABG  Left bundle branch block  Benign prostatic hypertrophy  Mixed hyperlipidemia  Major depressive disorder  Dementia  Gastroesophageal reflux disease without esophagitis  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  Polio at age 10    Plan:      Patient is admitted to progressive unit  Oxygen to keep SPO2 more than 90%  Telemetry  Check vital signs closely  CBC BMP daily  Blood culture  Gastroenterology is involved, they are suspecting spontaneous bacterial peritonitis, patient is continued on IV antibiotics IV Rocephin, further they will follow-up fluid cultures from ascitic fluid and patient is status post paracentesis with 3 L removal fluid on 12/12/2022  Patient is status post EGD 12/15/2022, evidence of mass-effect and narrowing at GE junction and cardia multiple biopsies are taken which showed adenocarcinoma  Urology consult  Continue Plavix  Continue Crestor  Infectious disease have evaluated patient, they recommended patient likely does not have spontaneous bacterial peritonitis, for now they have patient completed IV Rocephin  Patient and the family would like to have discussion with hospice which likely happen on Monday  Continue other medication as below  Hospice consult  Plan Home with hospice  Discussed with patient and family at bedside, patient CODE STATUS is changed to DNR CC  Family at bedside  Answered all questions  DIANN  Rx  Discussed with RN, discharge planner  Spent more than 35 minutes      Scheduled Meds:   sodium chloride flush  5-40 mL IntraVENous 2 times per day    sodium chloride  50 mL IntraVENous Once    famotidine  40 mg Oral BID    rosuvastatin  10 mg Oral Daily    pantoprazole  40 mg Oral QAM AC    amitriptyline  25 mg Oral Nightly    donepezil  10 mg Oral BID    finasteride  5 mg Oral Daily    magnesium oxide  400 mg Oral Daily    melatonin  9 mg Oral Nightly    pregabalin  100 mg Oral BID    sucralfate  1 g Oral 4x Daily    enoxaparin  40 mg SubCUTAneous Daily     Continuous Infusions:   sodium chloride      dextrose 5 % and 0.45 % NaCl 75 mL/hr at 22 0123     PRN Meds:  fentanNYL, 25 mcg, Q4H PRN  sodium chloride flush, 5-40 mL, PRN  sodium chloride, , PRN  potassium chloride, 10 mEq, PRN  nitroGLYCERIN, 0.4 mg, Q5 Min PRN  magnesium sulfate, 1,000 mg, PRN  ondansetron, 4 mg, Q8H PRN   Or  ondansetron, 4 mg, Q6H PRN  polyethylene glycol, 17 g, Daily PRN  acetaminophen, 650 mg, Q6H PRN   Or  acetaminophen, 650 mg, Q6H PRN          Subjective:     Patient seen and examined at bedside and reviewed  last 24 hrs events with nursing staff  No acute events overnight. Patient denies any acute complaints. Afebrile  Patient denies any chest pain, shortness of Breath, palpitation, headache, dizziness, cough, nausea, vomiting, changes in urination or bowel habit or rash. Patient denies any abdominal pain  Plan paracentesis before discharge today  They will not be able to place a drain        ROS:  A 10 point system reviewed and negative otherwise mentioned above.         Physical Examination:      Vitals:  /62   Pulse 56   Temp 97.5 °F (36.4 °C) (Oral)   Resp 20   Ht 5' 9\" (1.753 m)   Wt 159 lb (72.1 kg)   SpO2 95%   BMI 23.48 kg/m²   Temp (24hrs), Av.5 °F (36.9 °C), Min:97.3 °F (36.3 °C), Max:99.5 °F (37.5 °C)    Weight: Wt Readings from Last 3 Encounters:   12/16/22 159 lb (72.1 kg)   10/11/21 155 lb (70.3 kg)   02/17/20 150 lb (68 kg)     I/O last 3 completed shifts:  I/O last 3 completed shifts: In: 2205.4 [P.O.:400; I.V.:1805.4]  Out: 8955 [Urine:2675]     No results for input(s): POCGLU in the last 72 hours. General appearance - alert, well appearing, and in no acute distress  Mental status -memory impairment  Head - normocephalic and atraumatic  Eyes - pupils equal and reactive, extraocular eye movements intact, conjunctiva clear  Ears - hearing appears to be intact  Nose - no drainage noted  Mouth - mucous membranes moist  Neck - supple, no carotid bruits, thyroid not palpable  Chest - clear to auscultation, normal effort  Heart - normal rate, regular rhythm, no murmur  Abdomen - soft, diffuse abdominal tenderness, distended, bowel sounds present all four quadrants, no masses, hepatomegaly or splenomegaly  Neurological - normal speech, no focal findings or movement disorder noted, cranial nerves II through XII grossly intact  Extremities - peripheral pulses palpable, no pedal edema or calf pain with palpation  Skin - no gross lesions, rashes, or induration noted        Medications: Allergies:    Allergies   Allergen Reactions    Ciprofloxacin Other (See Comments)     Nerve pain exacerbation in feet    Codeine Other (See Comments)     insomia & tachycardia    Morphine      Pt states he has problems with his bp when given morphine    Pletal [Cilostazol]     Sulfamethoxazole-Trimethoprim     Pcn [Penicillins] Rash       Current Meds:   Current Facility-Administered Medications:     fentaNYL (SUBLIMAZE) injection 25 mcg, 25 mcg, IntraVENous, Q4H PRN, Elijah Loya MD, 25 mcg at 12/20/22 0353    sodium chloride flush 0.9 % injection 5-40 mL, 5-40 mL, IntraVENous, 2 times per day, Zain Philip MD, 10 mL at 12/17/22 2033    sodium chloride flush 0.9 % injection 5-40 mL, 5-40 mL, IntraVENous, PRN, Zain Philip MD, 10 mL at 12/20/22 0354    0.9 % sodium chloride infusion, , IntraVENous, PRN, Maria Dolores Acevedo MD    0.9 % sodium chloride bolus, 50 mL, IntraVENous, Once, Yu Li MD    famotidine (PEPCID) tablet 40 mg, 40 mg, Oral, BID, Yu Li MD, 40 mg at 12/19/22 2113    rosuvastatin (CRESTOR) tablet 10 mg, 10 mg, Oral, Daily, Yu Li MD, 10 mg at 12/17/22 0901    pantoprazole (PROTONIX) tablet 40 mg, 40 mg, Oral, QAM AC, Yu Li MD, 40 mg at 12/20/22 0639    dextrose 5 % and 0.45 % sodium chloride infusion, , IntraVENous, Continuous, Yu Li MD, Last Rate: 75 mL/hr at 12/20/22 0123, New Bag at 12/20/22 0123    potassium chloride 10 mEq/100 mL IVPB (Peripheral Line), 10 mEq, IntraVENous, PRN, Yu Li MD, Stopped at 12/18/22 1419    amitriptyline (ELAVIL) tablet 25 mg, 25 mg, Oral, Nightly, Yu Li MD, 25 mg at 12/18/22 2043    donepezil (ARICEPT) tablet 10 mg, 10 mg, Oral, BID, Yu Li MD, 10 mg at 12/19/22 2113    finasteride (PROSCAR) tablet 5 mg, 5 mg, Oral, Daily, Yu Li MD, 5 mg at 12/17/22 0901    magnesium oxide (MAG-OX) tablet 400 mg, 400 mg, Oral, Daily, Yu Li MD, 400 mg at 12/18/22 0941    melatonin tablet 9 mg, 9 mg, Oral, Nightly, Yu Li MD, 9 mg at 12/19/22 2113    nitroGLYCERIN (NITROSTAT) SL tablet 0.4 mg, 0.4 mg, SubLINGual, Q5 Min PRN, Yu Li MD    pregabalin (LYRICA) capsule 100 mg, 100 mg, Oral, BID, Yu Li MD, 100 mg at 12/19/22 2113    sucralfate (CARAFATE) tablet 1 g, 1 g, Oral, 4x Daily, Yu Li MD, 1 g at 12/18/22 2043    enoxaparin (LOVENOX) injection 40 mg, 40 mg, SubCUTAneous, Daily, Yu Li MD, 40 mg at 12/18/22 0942    magnesium sulfate 1000 mg in dextrose 5% 100 mL IVPB, 1,000 mg, IntraVENous, PRN, Yu Li MD, Stopped at 12/18/22 1142    ondansetron (ZOFRAN-ODT) disintegrating tablet 4 mg, 4 mg, Oral, Q8H PRN **OR** ondansetron (ZOFRAN) injection 4 mg, 4 mg, IntraVENous, Q6H PRN, Karina Plan U Nick Beckett MD, 4 mg at 12/19/22 1911    polyethylene glycol (GLYCOLAX) packet 17 g, 17 g, Oral, Daily PRN, Cliff Garzon MD    acetaminophen (TYLENOL) tablet 650 mg, 650 mg, Oral, Q6H PRN, 650 mg at 12/16/22 1233 **OR** acetaminophen (TYLENOL) suppository 650 mg, 650 mg, Rectal, Q6H PRN, Cliff Garzon MD      I/O (24Hr): Intake/Output Summary (Last 24 hours) at 12/20/2022 0750  Last data filed at 12/20/2022 1774  Gross per 24 hour   Intake 2205.35 ml   Output 1475 ml   Net 730.35 ml         Data:           Labs:    Hematology:  Recent Labs     12/18/22  0515 12/19/22  0545 12/20/22  0528   WBC 12.9* 12.7* 13.3*   RBC 4.44 4.38 4.53   HGB 12.4* 12.3* 12.5*   HCT 38.4* 37.9* 39.1*   MCV 86.5 86.5 86.3   MCH 27.9 28.1 27.6   MCHC 32.3 32.5 32.0   RDW 13.5 13.5 13.6    347 352   MPV 10.5 10.6 10.6       Chemistry:  Recent Labs     12/18/22  0515 12/18/22  1606 12/19/22  0545 12/20/22  0528     --  135 135   K 3.3* 3.6* 4.2 3.5*     --  99 99   CO2 26  --  28 27   GLUCOSE 121*  --  141* 129*   BUN 5*  --  5* 6*   CREATININE 0.62*  --  0.65* 0.59*   MG 1.6  --   --  1.8   ANIONGAP 9  --  8* 9   LABGLOM >60  --  >60 >60   CALCIUM 8.1*  --  8.1* 8.2*       Recent Labs     12/18/22  0515 12/19/22  0545 12/20/22  0528   PROT 5.3* 5.4* 5.4*   LABALBU 2.4* 2.5* 2.3*   AST 20 24 19   ALT 13 12 10   ALKPHOS 88 106 114   BILITOT 0.3 0.3 0.3         Lab Results   Component Value Date/Time    SPECIAL NOT REPORTED 02/18/2020 10:58 PM     Lab Results   Component Value Date/Time    CULTURE NO GROWTH 6 DAYS 12/12/2022 02:00 PM       No results found for: POCPH, PHART, PH, POCPCO2, MVN1CST, PCO2, POCPO2, PO2ART, PO2, POCHCO3, VFW8UDX, HCO3, NBEA, PBEA, BEART, BE, THGBART, THB, KXJ5UIB, KMNB3QGB, P2RTHCAG, O2SAT, FIO2    Radiology:    CT ABDOMEN PELVIS W IV CONTRAST Additional Contrast? None    Result Date: 12/9/2022  Moderate ascites with associated moderate diffuse edema within the mesentery.  Severe diverticulosis involving the left colon without evidence of acute diverticulitis. No abscess or perforation. Numerous bladder diverticula. Laminectomy at L4 and L5. Bilateral gynecomastia. FL MODIFIED BARIUM SWALLOW W VIDEO    Result Date: 12/10/2022  1. 1 instance of flash penetration without aspiration with the pureed/pudding thick substance. 2. No penetration or aspiration with the remainder of the administered substances/administrations. Please see separate speech pathology report for full discussion of findings and recommendations. All radiological studies reviewed  Code Status:  [facility]        Electronically signed by Marlene Cage MD on 12/20/2022 at 7:50 AM    This note was created with the assistance of a speech-recognition program.  Although the intention is to generate a document that actually reflects the content of the visit, no guarantees can be provided that every mistake has been identified and corrected by editing. Note was updated later by me after  physical examination and  completion of the assessment.

## 2022-12-20 NOTE — PROGRESS NOTES
_                         Today's Date: 12/19/2022  Patient Name: Jose Eduardo Barrett  Date of admission: 12/9/2022  5:11 PM  Patient's age: 81 y.o., 1940  Admission Dx: Diverticulosis of colon [K57.30]  Other ascites [R18.8]  Lower abdominal pain [R10.30]  Failure to thrive in adult [R62.7]  Ascites of liver [R18.8]      Requesting Physician: Chiquis Campa MD    CHIEF COMPLAINT: Abdominal pain. Weight loss. Ascites. SUBJECTIVE:  . Patient was seen and examined . Clinically stable. No events overnight. Continues to have abdominal discomfort. Large ascites. Mental status with no changes. No fever. No GI bleeding. BRIEF CASE HISTORY:    The patient is a 81 y.o.  male who is admitted to the hospital for further management of increasing abdominal pain and failure to thrive. Patient is unable to give any history. History is obtained from the chart and talking to the wife and daughter. Patient presented to [facility] due to worsening abdominal pain and abdominal distention since October. He had weight loss and decreased appetite. Patient had recurrent episodes of vomiting yellow mucus and multiple episodes of diarrhea. He had no fever. He had abdominal CT scan which showed ascites. There was no perforation. No bowel obstruction. Patient had evaluation by ID and by gastroenterology. He will have EGD tomorrow. Last colonoscopy was 2017. Patient had multiple comorbidities including coronary artery disease and benign prostate hypertrophy as well as hyperlipidemia. And history of Alzheimer disease.     Past Medical History:   has a past medical history of Bladder stone, CAD (coronary artery disease), Diverticulitis, GERD (gastroesophageal reflux disease), Hyperlipidemia, Hypertension, Intermittent self-catheterization of bladder, LBBB (left bundle branch block), Malignant neoplasm of lower third of esophagus ([facility] [address] MI (myocardial infarction) ([facility] [address] Neuropathy, Polio, and Wears glasses. Past Surgical History:   has a past surgical history that includes Coronary artery bypass graft (2000); Coronary angioplasty with stent; Cardiac surgery (2000); Colonoscopy; Endoscopy, colon, diagnostic; Bladder stone removal; Esophagogastroduodenoscopy (12/15/2022); and Upper gastrointestinal endoscopy (N/A, 12/15/2022). Family History: family history is not on file. Social History:   reports that he has never smoked. He has never used smokeless tobacco. He reports that he does not drink alcohol and does not use drugs. Medications:    Prior to Admission medications    Medication Sig Start Date End Date Taking? Authorizing Provider   cefdinir (OMNICEF) 300 MG capsule Take 300 mg by mouth 2 times daily 14-day course filled 12/8/22   Yes Historical Provider, MD   dicyclomine (BENTYL) 10 MG capsule Take 10 mg by mouth 2 times daily as needed   Yes Historical Provider, MD   omeprazole (PRILOSEC) 40 MG delayed release capsule Take 40 mg by mouth daily   Yes Historical Provider, MD   metroNIDAZOLE (FLAGYL) 500 MG tablet Take 500 mg by mouth 2 times daily 24-day course filled 12/1/22   Yes Historical Provider, MD   pregabalin (LYRICA) 100 MG capsule Take 100 mg by mouth 2 times daily.     Historical Provider, MD   finasteride (PROSCAR) 5 MG tablet Take 5 mg by mouth daily    Historical Provider, MD   donepezil (ARICEPT) 10 MG tablet Take 10 mg by mouth 2 times daily    Historical Provider, MD   amitriptyline (ELAVIL) 25 MG tablet Take 25-50 mg by mouth nightly    Historical Provider, MD   rosuvastatin (CRESTOR) 10 MG tablet Take 10 mg by mouth daily    Historical Provider, MD   ondansetron (ZOFRAN) 4 MG tablet Take 4 mg by mouth every 8 hours as needed for Nausea or Vomiting    Historical Provider, MD   Melatonin 10 MG CAPS Take 10-20 mg by mouth nightly    Historical Provider, MD   famotidine (PEPCID) 40 MG tablet Take 40 mg by mouth 2 times daily Historical Provider, MD   acetaminophen (TYLENOL) 500 MG tablet Take 500 mg by mouth every 6 hours as needed prn    Historical Provider, MD   Magnesium 400 MG TABS Take 400 mg by mouth Daily    Historical Provider, MD   vitamin D-3 (CHOLECALCIFEROL) 125 MCG (5000 UT) TABS Take 5,000 Units by mouth daily    Historical Provider, MD   Cyanocobalamin 2500 MCG TABS Take 5,000 mcg by mouth Daily    Historical Provider, MD   nitroGLYCERIN (NITROSTAT) 0.4 MG SL tablet Place 0.4 mg under the tongue every 5 minutes as needed for Chest pain up to max of 3 total doses. If no relief after 1 dose, call 911.  Using as needed but has not needed them    Historical Provider, MD   clopidogrel (PLAVIX) 75 MG tablet Take 75 mg by mouth daily    Historical Provider, MD   Diphenhydramine-APAP, sleep, (TYLENOL PM EXTRA STRENGTH PO) Take 1 tablet by mouth nightly as needed    Historical Provider, MD     Current Facility-Administered Medications   Medication Dose Route Frequency Provider Last Rate Last Admin    fentaNYL (SUBLIMAZE) injection 25 mcg  25 mcg IntraVENous Q4H PRN Elijah Loya MD   25 mcg at 12/19/22 1741    sodium chloride flush 0.9 % injection 5-40 mL  5-40 mL IntraVENous 2 times per day Amanda De León MD   10 mL at 12/17/22 2033    sodium chloride flush 0.9 % injection 5-40 mL  5-40 mL IntraVENous PRN Amanda De León MD   10 mL at 12/19/22 0352    0.9 % sodium chloride infusion   IntraVENous PRN Amanda De León MD        0.9 % sodium chloride bolus  50 mL IntraVENous Once Nathalie Chambers MD        famotidine (PEPCID) tablet 40 mg  40 mg Oral BID Nathalie Chambers MD   40 mg at 12/19/22 0918    rosuvastatin (CRESTOR) tablet 10 mg  10 mg Oral Daily Nathalie Chambers MD   10 mg at 12/17/22 0901    pantoprazole (PROTONIX) tablet 40 mg  40 mg Oral QAM AC Nathalie Chambers MD   40 mg at 12/19/22 0513    dextrose 5 % and 0.45 % sodium chloride infusion   IntraVENous Continuous Nathalie Chambers MD 75 mL/hr at 12/19/22 1813 Rate Verify at 12/19/22 1813    potassium chloride 10 mEq/100 mL IVPB (Peripheral Line)  10 mEq IntraVENous PRN Yu Li MD   Stopped at 12/18/22 1419    amitriptyline (ELAVIL) tablet 25 mg  25 mg Oral Nightly Yu Li MD   25 mg at 12/18/22 2043    donepezil (ARICEPT) tablet 10 mg  10 mg Oral BID Yu Li MD   10 mg at 12/19/22 8650    finasteride (PROSCAR) tablet 5 mg  5 mg Oral Daily Yu Li MD   5 mg at 12/17/22 0901    magnesium oxide (MAG-OX) tablet 400 mg  400 mg Oral Daily uY Li MD   400 mg at 12/18/22 0941    melatonin tablet 9 mg  9 mg Oral Nightly uY Li MD   9 mg at 12/18/22 2043    nitroGLYCERIN (NITROSTAT) SL tablet 0.4 mg  0.4 mg SubLINGual Q5 Min PRN Yu Li MD        pregabalin (LYRICA) capsule 100 mg  100 mg Oral BID Yu Li MD   100 mg at 12/19/22 0918    sucralfate (CARAFATE) tablet 1 g  1 g Oral 4x Daily Yu Li MD   1 g at 12/18/22 2043    enoxaparin (LOVENOX) injection 40 mg  40 mg SubCUTAneous Daily Yu Li MD   40 mg at 12/18/22 1851    magnesium sulfate 1000 mg in dextrose 5% 100 mL IVPB  1,000 mg IntraVENous PRN Yu Li MD   Stopped at 12/18/22 1142    ondansetron (ZOFRAN-ODT) disintegrating tablet 4 mg  4 mg Oral Q8H PRN Yu Li MD        Or    ondansetron [facility]) injection 4 mg  4 mg IntraVENous Q6H PRN Yu Li MD   4 mg at 12/19/22 1911    polyethylene glycol (GLYCOLAX) packet 17 g  17 g Oral Daily PRN Yu Li MD        acetaminophen (TYLENOL) tablet 650 mg  650 mg Oral Q6H PRN Yu Li MD   650 mg at 12/16/22 1233    Or    acetaminophen (TYLENOL) suppository 650 mg  650 mg Rectal Q6H PRN Yu Li MD           Allergies:  Ciprofloxacin, Codeine, Morphine, Pletal [cilostazol], Sulfamethoxazole-trimethoprim, and Pcn [penicillins]    REVIEW OF SYSTEMS:      Difficult to obtain from the patient. Discussion with the wife and daughter.   No other complaints about from was mentioned above.    PHYSICAL EXAM:      /75   Pulse 82   Temp 98.8 °F (37.1 °C) (Oral)   Resp 18   Ht 5' 9\" (1.753 m)   Wt 159 lb (72.1 kg)   SpO2 92%   BMI 23.48 kg/m²    Temp (24hrs), Av.2 °F (36.8 °C), Min:97.3 °F (36.3 °C), Max:98.8 °F (37.1 °C)      General appearance - not in pain or distress. Looks cachectic. Mental status -patient is sleepy at the time of my exam.  Eyes - pupils equal and reactive, extraocular eye movements intact  Ears - bilateral TM's and external ear canals normal  Nose - normal and patent, no erythema, discharge or polyps  Mouth - mucous membranes moist, pharynx normal without lesions  Neck - supple, no significant adenopathy  Lymphatics - no palpable lymphadenopathy, no hepatosplenomegaly  Chest - clear to auscultation, no wheezes, rales or rhonchi, symmetric air entry  Heart - normal rate, regular rhythm, normal S1, S2, no murmurs, rubs, clicks or gallops  Abdomen -generalized abdominal tenderness. , no masses or organomegaly. Ascites. Neurological -no apparent focal neurological deficit  Musculoskeletal - no joint tenderness, deformity or swelling  Extremities - peripheral pulses normal, no pedal edema, no clubbing or cyanosis  Skin - normal coloration and turgor, no rashes, no suspicious skin lesions noted           DATA:      Labs:       CBC:   Recent Labs     22  0515 22  0545   WBC 12.9* 12.7*   HGB 12.4* 12.3*   HCT 38.4* 37.9*    347     BMP:   Recent Labs     22  0515 22  1606 22  0545     --  135   K 3.3* 3.6* 4.2   CO2 26  --  28   BUN 5*  --  5*   CREATININE 0.62*  --  0.65*   LABGLOM >60  --  >60   GLUCOSE 121*  --  141*     PT/INR: No results for input(s): PROTIME, INR in the last 72 hours. APTT:No results for input(s): APTT in the last 72 hours. LIVER PROFILE:  Recent Labs     22  0515 22  0545   AST 20 24   ALT 13 12   LABALBU 2.4* 2.5*     US ABDOMEN LIMITED Specify organ?  LIVER  Narrative: EXAMINATION:  RIGHT UPPER QUADRANT ULTRASOUND    12/19/2022 11:52 am    COMPARISON:  None. HISTORY:  ORDERING SYSTEM PROVIDED HISTORY: evaluate for ascites  TECHNOLOGIST PROVIDED HISTORY:    evaluate for ascites  Specify organ?->LIVER    FINDINGS:  LIVER:  The liver demonstrates normal echogenicity without evidence of  intrahepatic biliary ductal dilatation. Liver is 15.4 cm in length. Hepatopetal flow portal vein. BILIARY SYSTEM:  Gallbladder is unremarkable without evidence of  pericholecystic fluid, wall thickening or stones. Negative sonographic  Pruitt's sign. Common bile duct is within normal limits measuring mm. RIGHT KIDNEY: The right kidney is grossly unremarkable without evidence of  hydronephrosis. PANCREAS:  Visualized portions of the pancreas are unremarkable. OTHER: Moderate ascites. Impression: Moderate ascites. Exam otherwise unremarkable    RECOMMENDATIONS:  Unavailable            IMPRESSION:    Primary Problem  Other ascites    Active Hospital Problems    Diagnosis Date Noted    Dysphagia [R13.10] 12/17/2022     Priority: Medium    Esophageal mass [K22.89] 12/17/2022     Priority: Medium    Malignant neoplasm of lower third of esophagus ([address] [C15.5] 12/16/2022     Priority: Medium    Severe malnutrition ([address] [E43] 12/12/2022     Priority: Medium    Lower abdominal pain [R10.30] 12/10/2022     Priority: Medium    Diffuse abdominal pain [R10.84] 12/10/2022     Priority: Medium    Failure to thrive in adult [R62.7] 12/10/2022     Priority: Medium    Anorexia [R63.0] 12/10/2022     Priority: Medium    Other ascites [R18.8] 12/09/2022     Priority: Medium       RECOMMENDATIONS:  Records and labs and images were reviewed and discussed with the family. Patient was also evaluated by multiple other specialties. Appreciated. Pathology results from the biopsy done yesterday from the lower esophagus is positive for adenocarcinoma.   So we are dealing with advanced metastatic lower

## 2022-12-20 NOTE — PROGRESS NOTES
Pt had drain placed earlier by IR earlier, Vernon Kirby RN dropped off supplies and educated pt wife on how to use, pt will be discharged home with hospice shortly

## 2022-12-20 NOTE — PROGRESS NOTES
Nutrition Note    Nutrition Note    Type and Reason for Visit: Reassess    Patient will be discharge today with home Hospice Care. No additional nutrition intervention is planned at this time. Patient will be followed based on length of stay, unless otherwise requested.             Ross REDMOND, RDN, LDN  Lead Clinical Dietitian  RD Office Phone [phone]

## 2022-12-20 NOTE — BRIEF OP NOTE
Brief Postoperative Note    You Loss  YOB: 1940  [id number]    Pre-operative Diagnosis: Malignant ascites from known malignant neoplasm lower 3rd esophagus     Post-operative Diagnosis: Same    Procedure: Aspira peritoneal drain insertion    Medications Given: none    Anesthesia: Local    Surgeons/Assistants: Eneida Helton MD    Estimated Blood Loss: minimal    Complications: none    Specimens: were not obtained    Findings: 15.5 F Aspira tunneled drain inserted rt upper abdomen with drain tip in the pelvis; 3.7 L nereida ascitic fluid removed. Pt tolerated well.       Electronically signed by Eneida Helton MD on 12/20/2022 at 10:02 AM

## 2022-12-20 NOTE — PROGRESS NOTES
Pt discharged to home by [facility] ambulance, [facility] pt discharged with jairon in, pt given IVP fentanyl jsut before he left, pt discharged in stable condition

## 2023-01-09 NOTE — PROGRESS NOTES
Occupational Therapy  [facility]  Rehabilitation Occupational Therapy Daily Treatment Note    Date: 22  Patient Name: Ruchi Limon       Room: 5273/7483-36  MRN: [id number]  Account: [de-identified]   : 1940  (81 y.o.) Gender: male           Pt currently functioning below baseline. Recommend daily inpatient skilled therapy at time of discharge to maximize long term outcomes and prevent re-admission. Please refer to AM-PAC score for current level of function. Past Medical History:  has a past medical history of Bladder stone, CAD (coronary artery disease), Diverticulitis, GERD (gastroesophageal reflux disease), Hyperlipidemia, Hypertension, Intermittent self-catheterization of bladder, LBBB (left bundle branch block), MI (myocardial infarction) ([facility] [address] Neuropathy, Polio, and Wears glasses. Past Surgical History:   has a past surgical history that includes Coronary artery bypass graft (); Coronary angioplasty with stent; Cardiac surgery (); Colonoscopy; Endoscopy, colon, diagnostic; and Bladder stone removal.    Restrictions  Restrictions/Precautions: Fall Risk;Bed Alarm;General Precautions; Up as Tolerated  Other position/activity restrictions: IV, telemetry  Required Braces or Orthoses?: No    Subjective  Subjective: First attempt at 1113, pt in bed and wife at bedside and stated pt just returned to bed. Second attempt pt agreeable to get up for lunch. Pt nauseated and very weak this date. Restrictions/Precautions: Fall Risk;Bed Alarm;General Precautions; Up as Tolerated             Objective     Cognition  Overall Cognitive Status: Exceptions  Arousal/Alertness: Appropriate responses to stimuli;Delayed responses to stimuli  Following Commands: Follows one step commands with repetition; Follows one step commands with increased time; Inconsistently follows commands  Attention Span: Appears intact; Attends with cues to redirect; Difficulty attending to directions  Memory: Decreased short term memory;Decreased recall of recent events  Safety Judgement: Decreased awareness of need for assistance;Decreased awareness of need for safety  Problem Solving: Assistance required to generate solutions;Assistance required to implement solutions;Assistance required to identify errors made;Assistance required to correct errors made;Decreased awareness of errors  Insights: Decreased awareness of deficits  Initiation: Requires cues for all  Sequencing: Requires cues for all  Orientation  Overall Orientation Status: Within Functional Limits   Perception  Overall Perceptual Status: Impaired  Initiation: Hand over hand to initiate tasks                  Functional Mobility  Skilled Clinical Factors: Nigel perdomo  Bed Mobility  Overall Assistance Level: Maximum Assistance; Requires x 2 Assistance  Additional Factors: Verbal cues; Increased time to complete; Head of bed raised; With handrails  Supine to Sit  Assistance Level: Maximum assistance; Requires x 2 assistance  Skilled Clinical Factors: Max verbal and hand over hand cues to initiate/sequence movements, use of bed rail, to stay on task, bring UB upright and to maintain static sitting balance. Pt req'd MAX A to maintain sitting balance on EOB. Scooting  Assistance Level: Maximum assistance; Requires x 2 assistance  Skilled Clinical Factors: to scoot self to EOB  Transfers  Surface: From bed; To chair with arms  Additional Factors: Verbal cues; Hand placement cues  Device: Lift equipment (adrianna stedy)  Sit to Stand  Assistance Level: Moderate assistance; Requires x 2 assistance;Maximum assistance  Stand to Sit  Assistance Level: Moderate assistance;Maximum assistance; Requires x 2 assistance  Skilled Clinical Factors: Max verbal/tactile cues for hand/foot placement, nose over toes, upright posture, controlled stand to sit and overall safety.    Neuromuscular Education  Neuromuscular education: Yes  NDT Treatment: Sitting;Standing Assessment  Assessment  Assessment: Pt tolerated session poorly and remains limited by fatigue, pain, global weakness, decreased activity tolerance, cognitive deficits and B LE weakness. Pt req's 2 staff assist for bed mobility, transfers and mobility with use of adrianna stedy. Pt is a high fall risk. Continued skilled OT services are indicated to maximize this pt's safety and IND with self care tasks and to facilitate safe return to PLOF as able. Activity Tolerance: Patient limited by pain; Patient limited by fatigue;Patient limited by endurance  Discharge Recommendations: Patient would benefit from continued therapy after discharge  Safety Devices  Safety Devices in place: Yes  Type of devices: All fall risk precautions in place; Left in chair;Nurse notified;Call light within reach; Chair alarm in place;Gait belt;Patient at risk for falls    Patient Education  Education  Education Given To: Patient  Education Provided: Mobility Training;Transfer Training;Energy Conservation;Cognition; Safety;Equipment; Fall Prevention Strategies  Education Method: Demonstration;Verbal;Teach Back  Barriers to Learning: Cognition  Education Outcome: Continued education needed    Plan  Occupational Therapy Plan  Times Per Week: 4-5X per week, 1x/day as aylin  Current Treatment Recommendations: Strengthening;Balance training;Functional mobility training; Endurance training;Cognitive reorientation; Safety education & training;Self-Care / ADL;Neuromuscular re-education;Patient/Caregiver education & training    Goals  Patient Goals   Patient goals : To get stronger  Short Term Goals  Time Frame for Short Term Goals:  For LOS in hospital, pt will  Short Term Goal 1: complete grooming task with setup  Short Term Goal 2: complete UB bathe and dress with min assist  Short Term Goal 3: complete LB bathe and dress with mod assist  Short Term Goal 4: complete toileting task with min assist  Short Term Goal 5: complete functional mobility for ADL with CG    AM-PAC Score        AM-PAC Inpatient Daily Activity Raw Score: 13 (12/14/22 1303)  AM-PAC Inpatient ADL T-Scale Score : 32.03 (12/14/22 1303)  ADL Inpatient CMS 0-100% Score: 63.03 (12/14/22 1303)  ADL Inpatient CMS G-Code Modifier : CL (12/14/22 1303)      Therapy Time   Individual Concurrent Group Co-treatment   Time In       1142   Time Out       1155   Minutes       13    Co-treatment with PT warranted secondary to decreased safety and independence requiring 2 skilled therapy professionals to address individual discipline's goals. OT addressing preparation for ADL transfer, sitting balance for increased ADL performance, sitting/activity tolerance, functional reaching, environmental safety/scanning, fall prevention, functional mobility for ADL transfers, ability to sequence and follow directions, bed mobility tech, and functional UE strength.          Micheal Taylor FABIÁN epigastric burning   1-2 weeks

## (undated) DEVICE — GAUZE,SPONGE,4"X4",16PLY,STRL,LF,10/TRAY: Brand: MEDLINE

## (undated) DEVICE — BLOCK BITE 60FR RUBBER ADLT DENTAL

## (undated) DEVICE — FORCEPS BX L240CM JAW DIA2.4MM ORNG L CAP W/ NDL DISP RAD

## (undated) DEVICE — GLOVE SURG 8 11.7IN BEAD CUF LIGHT BRN SENSICARE LTX FREE

## (undated) DEVICE — ADAPTER TBNG LUER STUB 15 GA INTMED

## (undated) DEVICE — Device: Brand: DEFENDO VALVE AND CONNECTOR KIT

## (undated) DEVICE — MEDICINE CUP, GRADUATED, STER: Brand: MEDLINE

## (undated) DEVICE — CO2 CANNULA,SUPERSOFT, ADLT,7'O2,7'CO2: Brand: MEDLINE

## (undated) DEVICE — JELLY,LUBE,STERILE,FLIP TOP,TUBE,2-OZ: Brand: MEDLINE

## (undated) DEVICE — BASIN EMSIS 700ML GRAPHITE PLAS KID SHP GRAD